# Patient Record
Sex: MALE | Race: BLACK OR AFRICAN AMERICAN | NOT HISPANIC OR LATINO | Employment: FULL TIME | URBAN - METROPOLITAN AREA
[De-identification: names, ages, dates, MRNs, and addresses within clinical notes are randomized per-mention and may not be internally consistent; named-entity substitution may affect disease eponyms.]

---

## 2017-01-24 ENCOUNTER — TELEPHONE (OUTPATIENT)
Dept: HEMATOLOGY/ONCOLOGY | Facility: CLINIC | Age: 52
End: 2017-01-24

## 2017-01-24 NOTE — TELEPHONE ENCOUNTER
----- Message from Aurora Sharpe sent at 1/24/2017  9:43 AM CST -----  Pt called requesting a call back to make and appt/pls call back at [phone]

## 2017-01-24 NOTE — TELEPHONE ENCOUNTER
Spoke with patient, he was in the hospital for 7 weeks at [facility] and would like to schedule a hospital follow up will have dr. peter review and schedule

## 2017-02-03 ENCOUNTER — TELEPHONE (OUTPATIENT)
Dept: HEMATOLOGY/ONCOLOGY | Facility: CLINIC | Age: 52
End: 2017-02-03

## 2017-02-03 NOTE — TELEPHONE ENCOUNTER
Patient f/u appointment scheduled with labs per request. Patient verbalized understanding of time and place.

## 2017-02-06 ENCOUNTER — LAB VISIT (OUTPATIENT)
Dept: LAB | Facility: HOSPITAL | Age: 52
End: 2017-02-06
Attending: NURSE PRACTITIONER
Payer: COMMERCIAL

## 2017-02-06 DIAGNOSIS — C90.00 MYELOMA: ICD-10-CM

## 2017-02-06 LAB
ALBUMIN SERPL BCP-MCNC: 3.4 G/DL
ALP SERPL-CCNC: 99 U/L
ALT SERPL W/O P-5'-P-CCNC: 27 U/L
ANION GAP SERPL CALC-SCNC: 9 MMOL/L
AST SERPL-CCNC: 38 U/L
B2 MICROGLOB SERPL-MCNC: 10.5 UG/ML
BASOPHILS # BLD AUTO: 0.02 K/UL
BASOPHILS NFR BLD: 0.3 %
BILIRUB SERPL-MCNC: 0.4 MG/DL
BUN SERPL-MCNC: 32 MG/DL
CALCIUM SERPL-MCNC: 9.3 MG/DL
CHLORIDE SERPL-SCNC: 108 MMOL/L
CO2 SERPL-SCNC: 23 MMOL/L
CREAT SERPL-MCNC: 2.8 MG/DL
DIFFERENTIAL METHOD: ABNORMAL
EOSINOPHIL # BLD AUTO: 0.5 K/UL
EOSINOPHIL NFR BLD: 8.5 %
ERYTHROCYTE [DISTWIDTH] IN BLOOD BY AUTOMATED COUNT: 14 %
EST. GFR  (AFRICAN AMERICAN): 28.5 ML/MIN/1.73 M^2
EST. GFR  (NON AFRICAN AMERICAN): 24.6 ML/MIN/1.73 M^2
GLUCOSE SERPL-MCNC: 139 MG/DL
HCT VFR BLD AUTO: 29.1 %
HGB BLD-MCNC: 9 G/DL
LDH SERPL L TO P-CCNC: 169 U/L
LYMPHOCYTES # BLD AUTO: 1.8 K/UL
LYMPHOCYTES NFR BLD: 31.4 %
MAGNESIUM SERPL-MCNC: 1.7 MG/DL
MCH RBC QN AUTO: 30.1 PG
MCHC RBC AUTO-ENTMCNC: 30.9 %
MCV RBC AUTO: 97 FL
MONOCYTES # BLD AUTO: 0.9 K/UL
MONOCYTES NFR BLD: 15.5 %
NEUTROPHILS # BLD AUTO: 2.5 K/UL
NEUTROPHILS NFR BLD: 44.3 %
PLATELET # BLD AUTO: 243 K/UL
PMV BLD AUTO: 9.4 FL
POTASSIUM SERPL-SCNC: 4.4 MMOL/L
PROT SERPL-MCNC: 7.7 G/DL
RBC # BLD AUTO: 2.99 M/UL
SODIUM SERPL-SCNC: 140 MMOL/L
WBC # BLD AUTO: 5.74 K/UL

## 2017-02-06 PROCEDURE — 36415 COLL VENOUS BLD VENIPUNCTURE: CPT | Mod: PO

## 2017-02-06 PROCEDURE — 85025 COMPLETE CBC W/AUTO DIFF WBC: CPT | Mod: PO

## 2017-02-06 PROCEDURE — 86334 IMMUNOFIX E-PHORESIS SERUM: CPT | Mod: 26,,, | Performed by: PATHOLOGY

## 2017-02-06 PROCEDURE — 82232 ASSAY OF BETA-2 PROTEIN: CPT

## 2017-02-06 PROCEDURE — 86334 IMMUNOFIX E-PHORESIS SERUM: CPT

## 2017-02-06 PROCEDURE — 84165 PROTEIN E-PHORESIS SERUM: CPT

## 2017-02-06 PROCEDURE — 83520 IMMUNOASSAY QUANT NOS NONAB: CPT

## 2017-02-06 PROCEDURE — 84165 PROTEIN E-PHORESIS SERUM: CPT | Mod: 26,,, | Performed by: PATHOLOGY

## 2017-02-06 PROCEDURE — 83615 LACTATE (LD) (LDH) ENZYME: CPT

## 2017-02-06 PROCEDURE — 80053 COMPREHEN METABOLIC PANEL: CPT

## 2017-02-06 PROCEDURE — 83735 ASSAY OF MAGNESIUM: CPT

## 2017-02-07 LAB
ALBUMIN SERPL ELPH-MCNC: 3.52 G/DL
ALPHA1 GLOB SERPL ELPH-MCNC: 0.39 G/DL
ALPHA2 GLOB SERPL ELPH-MCNC: 1.03 G/DL
B-GLOBULIN SERPL ELPH-MCNC: 0.86 G/DL
GAMMA GLOB SERPL ELPH-MCNC: 1.4 G/DL
INTERPRETATION SERPL IFE-IMP: NORMAL
KAPPA LC SER QL IA: 5.69 MG/DL
KAPPA LC/LAMBDA SER IA: 1.34
LAMBDA LC SER QL IA: 4.24 MG/DL
PATHOLOGIST INTERPRETATION IFE: NORMAL
PATHOLOGIST INTERPRETATION SPE: NORMAL
PROT SERPL-MCNC: 7.2 G/DL

## 2017-02-08 ENCOUNTER — TELEPHONE (OUTPATIENT)
Dept: FAMILY MEDICINE | Facility: CLINIC | Age: 52
End: 2017-02-08

## 2017-02-08 ENCOUNTER — TELEPHONE (OUTPATIENT)
Dept: HEMATOLOGY/ONCOLOGY | Facility: CLINIC | Age: 52
End: 2017-02-08

## 2017-02-08 NOTE — TELEPHONE ENCOUNTER
Per [employer]: Mbr confirms his appt with Dr Stevenson on 2/17, states its a f/u from his hospital stay with [facility] (dc 1/25/17), Mbr is asking if he can have a later in the day appt time so he doesn't have to miss alot of work, please relay this msg to the doctor and have them call him for a later time.

## 2017-02-08 NOTE — TELEPHONE ENCOUNTER
Spoke with pt. Pt states he needs to reschedule. Appointment rescheduled. Pt verbalized understanding.

## 2017-02-17 ENCOUNTER — OFFICE VISIT (OUTPATIENT)
Dept: FAMILY MEDICINE | Facility: CLINIC | Age: 52
End: 2017-02-17
Payer: COMMERCIAL

## 2017-02-17 VITALS
SYSTOLIC BLOOD PRESSURE: 178 MMHG | TEMPERATURE: 98 F | WEIGHT: 315 LBS | DIASTOLIC BLOOD PRESSURE: 85 MMHG | BODY MASS INDEX: 38.36 KG/M2 | HEART RATE: 62 BPM | HEIGHT: 76 IN

## 2017-02-17 DIAGNOSIS — D50.0 IRON DEFICIENCY ANEMIA DUE TO CHRONIC BLOOD LOSS: ICD-10-CM

## 2017-02-17 DIAGNOSIS — I12.9 CKD STAGE 4 SECONDARY TO HYPERTENSION: ICD-10-CM

## 2017-02-17 DIAGNOSIS — E66.01 MORBID OBESITY, UNSPECIFIED OBESITY TYPE: ICD-10-CM

## 2017-02-17 DIAGNOSIS — I10 ESSENTIAL HYPERTENSION: Primary | ICD-10-CM

## 2017-02-17 DIAGNOSIS — D63.1 ANEMIA OF RENAL DISEASE: ICD-10-CM

## 2017-02-17 DIAGNOSIS — N18.4 CKD STAGE 4 SECONDARY TO HYPERTENSION: ICD-10-CM

## 2017-02-17 DIAGNOSIS — N18.9 ANEMIA OF RENAL DISEASE: ICD-10-CM

## 2017-02-17 DIAGNOSIS — R73.9 HYPERGLYCEMIA: ICD-10-CM

## 2017-02-17 PROCEDURE — 99999 PR PBB SHADOW E&M-EST. PATIENT-LVL III: CPT | Mod: PBBFAC,,, | Performed by: FAMILY MEDICINE

## 2017-02-17 PROCEDURE — 3077F SYST BP >= 140 MM HG: CPT | Mod: S$GLB,,, | Performed by: FAMILY MEDICINE

## 2017-02-17 PROCEDURE — 99214 OFFICE O/P EST MOD 30 MIN: CPT | Mod: S$GLB,,, | Performed by: FAMILY MEDICINE

## 2017-02-17 PROCEDURE — 3079F DIAST BP 80-89 MM HG: CPT | Mod: S$GLB,,, | Performed by: FAMILY MEDICINE

## 2017-02-17 RX ORDER — HYDRALAZINE HYDROCHLORIDE 50 MG/1
50 TABLET, FILM COATED ORAL EVERY 12 HOURS
Qty: 60 TABLET | Refills: 11 | Status: SHIPPED | OUTPATIENT
Start: 2017-02-17 | End: 2017-07-11 | Stop reason: ALTCHOICE

## 2017-02-17 RX ORDER — FUROSEMIDE 20 MG/1
20 TABLET ORAL 2 TIMES DAILY
COMMUNITY
End: 2019-04-12

## 2017-02-17 NOTE — PROGRESS NOTES
Subjective:      Patient ID: Leodan Dan is a 51 y.o. male.    Chief Complaint: Follow-up (hospital)    HPIhe is f/u on his hospitalization for his left knee replacement in May which got infected and he went to the hospital to go back in and he had to have some parts replaced.    He is here to f/u on his other medical problems.     Discharge Summaries  - in this encounter    Carlos Ibarra MD - 12/13/2016 10:04 AM CST  Formatting of this note may be different from the original.  [facility] DISCHARGE SUMMARY    Patient ID:  Leodan Dan  [id number]  51 y.o.  1965    Admit Date:   12/4/2016 1:43 PM    Discharge Date:   Discharge Today: 12/13/2016    Admitting Physician:   Carlos Ibarra MD    Discharge Physician:   Carlos Ibarra MD    Consults:  Consultants   Provider Service Role Specialty   Paramjit Fernandez MD Nephrology Consulting Physician Nephrology   Arianna Hermna NP -- Nurse Practitioner Nurse Practitioner   Melvi Guo MD Infectious Disease Consulting Physician Infectious Diseases   Jose Looney MD Infectious Disease Consulting Physician Infectious Diseases   Gray Cuellar DPM Podiatry Consulting Physician Podiatry   Colt Kingsley MD Orthopedics Consulting Physician Orthopedic Surgery   Nathan Rodriguez MD -- Consulting Physician Pulmonary Disease   Hugo Boyer MD Cardiology Consulting Physician Cardiology   Franky Branch MD Cardiothoracic Surgery Consulting Physician Cardiothoracic Surgery   Lars Hirsch MD Cardiothoracic Surgery Consulting Physician Cardiothoracic Surgery       Reason for Admission/Admission Diagnoses:   Present on Admission:   Venous stasis ulcer   Infected wound   Morbid obesity due to excess calories   Myasthenia gravis in remission   Obstructive sleep apnea   Acute worsening of stage 3 chronic kidney disease   MRSA bacteremia    Discharge Diagnoses:   Active Hospital Problems   Diagnosis Date Noted    MRSA bacteremia  12/10/2016    Infected wound 12/05/2016    Acute worsening of stage 3 chronic kidney disease    Myasthenia gravis in remission 06/08/2016    Morbid obesity due to excess calories    Obstructive sleep apnea    Venous stasis ulcer 12/13/2013     Resolved Hospital Problems   Diagnosis Date Noted Date Resolved     History of Present Illness:   Leodan Dan is a 51 y.o. male that has a past medical history of Anemia; Arthritis; Chronic kidney disease; Encounter for blood transfusion; GERD (gastroesophageal reflux disease); Hormone disorder; MRSA infection (10/2013); Hypertension; Kidney stone; Multiple myeloma; Myasthenia gravis; Sleep apnea; and Urinary tract infection. Presents to the emergency room complaining of furnace of breath which started suddenly at noon when he was in [employer] not associated with any chest pain abdominal pain and hematochezia hemoptysis hematemesis OR dysuria hematuria. The patient states that he was in his normal state of health when he experienced shortness of breath and presented to the ED. Here he was placed on oxygen with improvement. Patient is morbidly obese and had an elevated d-dimer and does have an open foot wound. VQ scan not ordered however was treated empirically. Patient has subjective complaint of fever without chills night sweats productive cough sinus ache and diarrhea. He recently saw wound care last Thursday and is supposed to see them tomorrow.    Hospital Course and Treatment:   Admission Information   Date & Time Provider Department Dept. Phone   12/4/2016 Carlos Ibarra MD [facility] Ortho/Neurosurgery [phone]       LTAC transfer today for 6 weeks of IV Teflaro (last day will be 01/16/2017).      Sepsis due to MRSA: Improving with IV antibiotics, afebrile within last 24 hours. Source MRSA bacteremia. Continue Teflaro (started on 12/05/16) as current per ID recommendations.     MRSA Bacteremia: Positive blood culture on 12/04/16.  Likely source right ankle ulcer. SHELTON done 12/07/16 was negative for vegetations/endocarditis. Continue Teflaro as current. Repeat blood cultures 12/06/16 negative so far. Blood cultures from 12/08/16 no growth so far.     Acute Pulmonary Edema: Resolved, extubated 12/08/16 evening. Continue diuretics, changed to oral.     Acute on chronic hypoxemic respiratory failure: Extubated on 12/08/16.     Acute worsening of chronic kidney disease stage IV: Serum creatinine has improved to 3.8, nephrology following.     Acute blood loss anemia: Status post 2 units PRBC transfusion on 12/06/16. Hemoglobin improved from 7.9-9.1. Currently 8.7.      Severe iron Deficiency Anemia: TSAT 4% on 12/05/16. Received 2 units PRBC transfusion on 12/06/16.     Left knee pain: History of left total knee arthroplasty in the past. There was a concern for septic arthritis of the left knee, orthopedics has evaluated and Dr.Lafluer has taken the patient to OR on 12/06/16 for I&D and washout, but synovial fluid cultures remain negative for bacterial pathogens. Dr. Mccollum planned for another washout of the left knee if he re-accumulated more joint fluid, which he did not and hence no repeat washout was needed yesterday.      Right ankle ulcer: Wound culture positive for MRSA, heavy growth of E. coli and Proteus. Currently on Teflaro as an antibiotic of choice. ID following. Patient was seen and evaluated by Dr.David Cuellar, podiatrist on 12/07/16.  was consulted for possible arterial insufficiency, but no arterial disease was identified,  recommended aggressive wound care.     Hyperglycemia: Not a diabetic, hemoglobin A1c is normal at 5.3 on 12/07/16.. Maintaind him on low-dose insulin sliding scale.     Hypokalemia: replaced as needed. K was 5.3 today. Discontinued scheduled K supplementation.     Morbid obesity: BMI 56.9:     History of myasthenia gravis.     History of Multiple Myeloma: patient followed by  as  out-patient. Currently in remission.    I discussed with the patient disease process and treatment.     I have personally seen and examined the patient, Leodan Dan, in a face to face encounter on the date of discharge.     He is cleared for discharge with instructions to follow up as directed.   Total time in the care and discharge planning of this patient was greater than 30 minutes.    Significant Diagnostic Studies:  Recent Imaging and Procedure Results   Procedure Component Value Ref Range Date/Time   Blood Culture #1 [[id number]] Collected: 12/08/2016 0339   Specimen: Blood from Blood Updated: 12/13/2016 0502   Soft Micro Culture   No growth 1 day(s).~No growth 2 day(s).~No growth 3 day(s).~No growth 4 day(s).~No growth 5 day(s).   Blood Culture #2 [[id number]] Collected: 12/08/2016 0347   Specimen: Blood from Blood Updated: 12/13/2016 0502   Soft Micro Culture   No growth 1 day(s).~No growth 2 day(s).~No growth 3 day(s).~No growth 4 day(s).~No growth 5 day(s).   Anaerobic culture [[id number]] Collected: 12/06/2016 2207   Specimen: Synovial Fluid from Synovial Fluid Updated: 12/12/2016 0724   Soft Micro Culture No anaerobes isolated   Anaerobic culture [[id number]] Collected: 12/06/2016 2208   Specimen: Synovial Fluid from Synovial Fluid Updated: 12/12/2016 0724   Soft Micro Culture No anaerobes isolated   Blood Culture #2 [[id number]] Collected: 12/06/2016 1037   Specimen: Blood from Blood Updated: 12/11/2016 1102   Soft Micro Culture   No growth 1 day(s).~No growth 2 day(s).~No growth 3 day(s).~No growth 4 day(s).~No growth 5 day(s).   Blood Culture #1 [[id number]] Collected: 12/06/2016 1028   Specimen: Blood from Blood Updated: 12/11/2016 1102   Soft Micro Culture   No growth 1 day(s).~No growth 2 day(s).~No growth 3 day(s).~No growth 4 day(s).~No growth 5 day(s).   Anaerobic Culture [[id number]] Collected: 12/06/2016 1845   Specimen: Synovial Fluid Updated: 12/11/2016 0943   Soft Micro Culture No  anaerobes isolated   Body Fluid Culture (Includes Gram Stain) [[id number]] Collected: 12/06/2016 2207   Specimen: Synovial Fluid from Synovial Fluid Updated: 12/10/2016 0700   Soft Micro Culture   No growth 1 day~No growth 2 days~No growth 3 days   Soft Micro Culture Few WBC's seen.~No organisms seen.   Body Fluid Culture (Includes Gram Stain) [[id number]] Collected: 12/06/2016 2208   Specimen: Synovial Fluid from Synovial Fluid Updated: 12/10/2016 0700   Soft Micro Culture   No growth 1 day~No growth 2 days~No growth 3 days   Soft Micro Culture Few WBC's seen.~No organisms seen.   Sputum culture Includes Gram Stain [[id number]] Collected: 12/06/2016 2315   Specimen: Sputum Induced Updated: 12/09/2016 0752   Soft Micro Culture Scant growth normal respiratory kp   Soft Micro Culture   <10 epithelial cells/LPF.~10-25 WBC's/LPF.~No organisms seen.   Body Fluid Culture (Includes Gram Stain) [[id number]] Collected: 12/06/2016 1845   Specimen: Synovial Fluid Updated: 12/09/2016 0743   Soft Micro Culture   No growth less than 16 hours~No growth 2 days~No growth 3 days   Soft Micro Culture Few WBC's seen.~No organisms seen.   Wound Culture (Includes Gram Stain) [[id number]] (Susceptibility) Collected: 12/04/2016 1548   Specimen: Wound from Wound Deep Updated: 12/09/2016 0728   Soft Micro Culture   Rare WBC's seen.~Few gram positive cocci (pairs).~Few gram positive rods.~Few gram negative rods.   Soft Micro Culture Escherichia coli   Soft Micro Culture heavy growth   Soft Micro Culture Proteus mirabilis   Soft Micro Culture   heavy growth~Possible Proteus~complete sensitivities to follow.   Soft Micro Culture Staphylococcus aureus   Soft Micro Culture   light growth~MRSA ISOLATED~CRITICAL VALUE CALLED TO AND READ BACK BY: SHEILA EVANS RN ON~12/07/2016 06:53 BY MTR   Soft Micro Culture Streptococcus agalactiae (Group B)   Soft Micro Culture light growth   Soft Micro Culture Enterococcus faecalis   Soft Micro Culture  light growth   Culture & Susceptibility   STAPHYLOCOCCUS AUREUS   Antibiotic Sensitivity Microscan Unit   Beta-Lactamase Pos ug/mL   Method: MINIMAL INHIBITORY CONCENTRATION   Clindamycin Resistant >=4 ug/mL   Method: MINIMAL INHIBITORY CONCENTRATION   Doxycycline Sensitive 2 ug/mL   Method: MINIMAL INHIBITORY CONCENTRATION   Erythromycin Resistant >=8 ug/mL   Method: MINIMAL INHIBITORY CONCENTRATION   Gentamicin Sensitive <=0.5 ug/mL   Method: MINIMAL INHIBITORY CONCENTRATION   Inducible Clindamycin Resist. Neg ug/mL   Method: MINIMAL INHIBITORY CONCENTRATION   Minocycline Resistant = ug/mL   Method: MINIMAL INHIBITORY CONCENTRATION   Oxacillin (Methicillin) Resistant >=4 ug/mL   Method: MINIMAL INHIBITORY CONCENTRATION   Rifampin Sensitive <=0.5 ug/mL   Method: MINIMAL INHIBITORY CONCENTRATION   Trimeth-Sulfa Sensitive <=10 ug/mL   Method: MINIMAL INHIBITORY CONCENTRATION   Vancomycin Sensitive 1 ug/mL   Method: MINIMAL INHIBITORY CONCENTRATION         ENTEROCOCCUS FAECALIS   Antibiotic Sensitivity Microscan Unit   Ampicillin Sensitive <=2 ug/mL   Method: MINIMAL INHIBITORY CONCENTRATION   Doxycycline Resistant >=16 ug/mL   Method: MINIMAL INHIBITORY CONCENTRATION   Gentamicin Synergy SYN-S ug/mL   Method: MINIMAL INHIBITORY CONCENTRATION   Streptomycin Synergy SYN-S ug/mL   Method: MINIMAL INHIBITORY CONCENTRATION   Tetracycline Resistant >=16 ug/mL   Method: MINIMAL INHIBITORY CONCENTRATION   Vancomycin Sensitive 1 ug/mL   Method: MINIMAL INHIBITORY CONCENTRATION         ESCHERICHIA COLI   Antibiotic Sensitivity Microscan Unit   Amox/KAug Sensitive 4 ug/mL   Method: MINIMAL INHIBITORY CONCENTRATION   Ampicillan/Sulbactam Intermediate 16 ug/mL   Method: MINIMAL INHIBITORY CONCENTRATION   Cefazolin Sensitive <=4 ug/mL   Method: MINIMAL INHIBITORY CONCENTRATION   Cefepime Sensitive <=1 ug/mL   Method: MINIMAL INHIBITORY CONCENTRATION   Ceftriaxone Sensitive <=1 ug/mL   Method: MINIMAL INHIBITORY CONCENTRATION    Ciprofloxacin Resistant >=4 ug/mL   Method: MINIMAL INHIBITORY CONCENTRATION   ESBL Neg ug/mL   Method: MINIMAL INHIBITORY CONCENTRATION   Gentamicin Sensitive <=1 ug/mL   Method: MINIMAL INHIBITORY CONCENTRATION   Levofloxacin Resistant >=8 ug/mL   Method: MINIMAL INHIBITORY CONCENTRATION   Meropenem Sensitive <=0.25 ug/mL   Method: MINIMAL INHIBITORY CONCENTRATION   Piperacillin/Manjit Sensitive <=4 ug/mL   Method: MINIMAL INHIBITORY CONCENTRATION   Trimeth-Sulfa Resistant >=320 ug/mL   Method: MINIMAL INHIBITORY CONCENTRATION         PROTEUS MIRABILIS   Antibiotic Sensitivity Microscan Unit   Amikacin Sensitive 8 ug/mL   Method: MINIMAL INHIBITORY CONCENTRATION   Meropenem Sensitive <=0.25 ug/mL   Method: MINIMAL INHIBITORY CONCENTRATION         STREPTOCOCCUS AGALACTIAE (GROUP B)   Antibiotic Sensitivity Microscan Unit   Ampicillin Sensitive <=0.25 ug/mL   Method: MINIMAL INHIBITORY CONCENTRATION   Cefotaxime Sensitive <=0.12 ug/mL   Method: MINIMAL INHIBITORY CONCENTRATION   Ceftriaxone Sensitive <=0.12 ug/mL   Method: MINIMAL INHIBITORY CONCENTRATION   Clindamycin Resistant >=1 ug/mL   Method: MINIMAL INHIBITORY CONCENTRATION   Erythromycin Resistant >=8 ug/mL   Method: MINIMAL INHIBITORY CONCENTRATION   Inducible Clindamycin Resist. Neg ug/mL   Method: MINIMAL INHIBITORY CONCENTRATION   Levofloxacin Sensitive 1 ug/mL   Method: MINIMAL INHIBITORY CONCENTRATION   Penicillin Sensitive <=0.06 ug/mL   Method: MINIMAL INHIBITORY CONCENTRATION   Tetracycline Resistant >=16 ug/mL   Method: MINIMAL INHIBITORY CONCENTRATION   Vancomycin Sensitive 0.5 ug/mL   Method: MINIMAL INHIBITORY CONCENTRATION               CATH LAB ONLY-SHELTON Order Panel* [[id number]] Resulted: 12/07/2016 1356   Updated: 12/07/2016 1356   Narrative:   Greg Cee MD 12/7/2016 1:56 PM  SHELTON Note    Name of the patient: Leodan Dan     Date: 12/07/16    : GREG CEE MD    Procedure: SHELTON    Indication: Fever and MRSA  bacteremia.     Procedure in detail:  Patient was informed and consented as to risks and benefits of   procedure. Patient has been NPO for procedure. Vital signs were   monitored throughout procedure. Patient was sedated with versed   and fentanyl. SHELTON probe was inserted in the esophagus. Multiple   echocardiographic views were obtained. Patient tolerated   procedure well with no complications. Patient was recovered in   good condition.    Impression:  1. No SHELTON evidence of endocarditis. (See full SHELTON report)     Recommendations:  1. As per ID.     SAUL CEE MD  [facility]  [address] [address]  [place] [address]  Office: [phone]       AFB stain [[id number]] Collected: 12/06/2016 2207   Specimen: Synovial Fluid Updated: 12/07/2016 1135   Soft Micro Culture No acid fast bacillus seen.   AFB stain [[id number]] Collected: 12/06/2016 2208   Specimen: Synovial Fluid Updated: 12/07/2016 1135   Soft Micro Culture No acid fast bacillus seen.   AFB stain [[id number]] Collected: 12/06/2016 1845   Specimen: Synovial Fluid Updated: 12/07/2016 1134   Soft Micro Culture No acid fast bacillus seen.   Gram stain [[id number]] Collected: 12/06/2016 2207   Specimen: Synovial Fluid Updated: 12/07/2016 0714   Soft Micro Culture Few WBC's seen.~No organisms seen.   Gram stain [[id number]] Collected: 12/06/2016 2208   Specimen: Synovial Fluid Updated: 12/07/2016 0714   Soft Micro Culture Few WBC's seen.~No organisms seen.   Gram stain [[id number]] Collected: 12/06/2016 2315   Specimen: Sputum Induced Updated: 12/07/2016 0713   Soft Micro Culture   <10 epithelial cells/LPF.~10-25 WBC's/LPF.~No organisms seen.   Gram stain [[id number]] Collected: 12/06/2016 1845   Specimen: Synovial Fluid Updated: 12/07/2016 0709   Soft Micro Culture Few WBC's seen.~No organisms seen.   Gram stain [[id number]] Collected: 12/04/2016 1534   Specimen: Blood Updated: 12/07/2016 0614   Soft Micro Culture   gram positive cocci  (clusters) in anaerobic blood culture bottle called~to Nurse Kelley 12/05/2016 21:02 sdw   Blood Culture x 1 [[id number]] (Susceptibility) Collected: 12/04/2016 1534   Specimen: CULTURE, BLOOD from Blood Updated: 12/07/2016 0614   Soft Micro Culture   No growth 1 day(s).~preliminary positive culture; see gram stain results   Soft Micro Culture Staphylococcus aureus   Soft Micro Culture   MRSA ISOLATED~CRITICAL VALUE CALLED TO AND READ BACK BY: Sita Lara Rn on~12/07/2016 06:01 by mtr   Culture & Susceptibility   STAPHYLOCOCCUS AUREUS   Antibiotic Sensitivity Microscan Unit   Beta-Lactamase Pos ug/mL   Method: MINIMAL INHIBITORY CONCENTRATION   Clindamycin Resistant >=4 ug/mL   Method: MINIMAL INHIBITORY CONCENTRATION   Doxycycline Sensitive <=0.5 ug/mL   Method: MINIMAL INHIBITORY CONCENTRATION   Erythromycin Resistant >=8 ug/mL   Method: MINIMAL INHIBITORY CONCENTRATION   Gentamicin Sensitive <=0.5 ug/mL   Method: MINIMAL INHIBITORY CONCENTRATION   Inducible Clindamycin Resist. Neg ug/mL   Method: MINIMAL INHIBITORY CONCENTRATION   Minocycline Sensitive = ug/mL   Method: MINIMAL INHIBITORY CONCENTRATION   Nitrofurantoin Sensitive <=16 ug/mL   Method: MINIMAL INHIBITORY CONCENTRATION   Oxacillin (Methicillin) Resistant >=4 ug/mL   Method: MINIMAL INHIBITORY CONCENTRATION   Rifampin Sensitive <=0.5 ug/mL   Method: MINIMAL INHIBITORY CONCENTRATION   Trimeth-Sulfa Sensitive <=10 ug/mL   Method: MINIMAL INHIBITORY CONCENTRATION   Vancomycin Sensitive 1 ug/mL   Method: MINIMAL INHIBITORY CONCENTRATION               US Lower Extremity Veins Bilateral [[id number]] Collected: 12/05/2016 1353   Updated: 12/05/2016 1357   Narrative:   REASON FOR EXAM: possible DVT; Dyspnea     TECHNICAL FACTORS: B-mode and Doppler sonographic images were obtained of the common femoral, femoral, popliteal, posterior tibial, peroneal, and proximal great saphenous and profunda femoral veins for assessment of  patency.    TECHNOLOGIST: Anita Bahena    COMPARISON: May 27, 2016    FINDINGS:   Within the right lower extremity, all veins demonstrate compressibility, no internal echoes, and phasic flow with normal augmentation.     Within the left lower extremity, all veins demonstrate compressibility, no internal echoes, and phasic flow with normal augmentation.     IMPRESSION:  No evidence of deep venous thrombosis of the lower extremities.         Electronically signed by Kayden Lopez MD on 2016 1:54 PM      Echo Complete [[id number]] Collected: 2016   Updated: 2016 1256   Narrative:       Adult Echocardiogram  Name: LOUISE FERNANDEZ Study Date: 2016  MRN: [id number] Age: 51 yrs  Patient Location: [facility] [place] Height: 78 in  : 1965 Weight: 470 lb  Gender: Male BSA: 3.2 m2  Reason For Study: chf  History: CHF  BP: 148/92 mmHg    Ordering Physician: ARVIND ROGERS  Referring Physician: GARRETT LUNA  Performed By: KARLY Holman    Interpretation Summary  Lung Interference  Left ventricular systolic function is normal.  Ejection Fraction = 55-60%.  A variety of Doppler measurements indicate normal left ventricular diastolic  function.  There is mild mitral regurgitation.    Measurements with Normals  IVSd: 1.1 cm (0.6-1.1 cm) LVIDd: 5.1 cm (3.7-5.6 cm)  LVPWd: 1.2 cm (0.6-1.1 cm)  IVSs: 1.9 cm (0.8-2.0 cm) LVIDs: 4.0 cm (2.3-3.9 cm)  LVPWs: 2.2 cm (0.6-1.1 cm)  Ao root diam: 2.8 cm (2.0-3.7 cm)  LA dimension: 4.5 cm (1.9-4.0 cm)    MMode/2D Measurements & Calculations  FS: 23.0 % % IVS thick: 72.9 %  EDV(Teich): 125.4 ml  ESV(Teich): 68.0 ml  EF(Teich): 45.8 %  Ao root area: 6.1 cm2    Doppler Measurements & Calculations  MV E max temitope: 105.7 cm/sec MV dec slope: 501.9 cm/sec2  MV A max temitope: 80.8 cm/sec MV dec time: 0.24 sec  MV E/A: 1.3  Ao V2 max: 193.8 cm/sec  Ao max PG: 15.2 mmHg    LEFT VENTRICLE    o The left ventricle is normal in size.  o Ejection Fraction = 55-60%.  o Left  ventricular systolic function is normal.  o There is normal left ventricular wall thickness.    DIASTOLOGY    o A variety of Doppler measurements indicate normal left ventricular  diastolic function.    RIGHT VENTRICLE    o The right ventricle is normal in size and function.  o RV systolic function grossly normal.    ATRIA    o The left atrial size is normal.  o Right atrial size is normal.    MITRAL VALVE    o The mitral valve is normal in structure and function.  o There is mild mitral regurgitation.    TRICUSPID VALVE    o The tricuspid valve is normal in structure and function.  o There is mild tricuspid regurgitation.    AORTIC VALVE    o Normal tricuspid aortic valve.  o No aortic regurgitation is present.  o AV MAX PG 15.2 mmHg.    PULMONIC VALVE    o The pulmonic valve is not well seen, but is grossly normal.    GREAT VESSELS    o The aortic root is normal size.  o Normal size aorta.  o The pulmonary artery is not well visualized.    PERICARDIUM/PLEURAL EFFUSION    o There is no pericardial effusion.  o There is no pleural effusion.    _______________________________________________________________________________    Electronically signed by: Greg Anderson M.D. 12/05/2016 12:55 PM   Gram stain [[id number]] Collected: 12/04/2016 1548   Specimen: Wound Deep Updated: 12/05/2016 0733   Soft Micro Culture   Rare WBC's seen.~Few gram positive cocci (pairs).~Few gram positive rods.~Few gram negative rods.       Surgical Procedures during this visit:    Procedure(s):  IRRIGATION AND DEBRIDEMENT - LOWER EXTREMITY WITH POSSIBLE POLYETHYLENE LINER EXCHANGE  Date  12/4/2016 - 12/6/2016  Primary Surgeon  Colt Kingsley MD  -------------------    Patient's Condition On Discharge:   Stable    Physical Exam   Constitutional: He is oriented to person, place, and time. He appears well-developed and well-nourished. No distress.   HENT:   Head: Normocephalic and atraumatic.   Nose: Nose normal.   Mouth/Throat:  Oropharynx is clear and moist.   Eyes: Conjunctivae and EOM are normal. Pupils are equal, round, and reactive to light. No scleral icterus.   Neck: Normal range of motion. Neck supple. No JVD present. No tracheal deviation present. No thyromegaly present.   Cardiovascular: Normal rate, regular rhythm, normal heart sounds and intact distal pulses. Exam reveals no friction rub.   No murmur heard.  Pulmonary/Chest: Effort normal and breath sounds normal. No respiratory distress. He has no wheezes. He has no rales. He exhibits no tenderness.   Abdominal: Soft. Bowel sounds are normal. He exhibits no distension. There is no tenderness. No hernia.   Musculoskeletal: He exhibits no edema or tenderness.   Left knee ROM limited   Lymphadenopathy:   He has no cervical adenopathy.   Neurological: He is alert and oriented to person, place, and time. No cranial nerve deficit. He exhibits normal muscle tone. Coordination normal.   Skin: Skin is warm. No rash noted. He is not diaphoretic. No erythema.   Psychiatric: He has a normal mood and affect. His behavior is normal.   Nursing note and vitals reviewed.    Discharge Disposition:   Order for Discharge   Start Ordered   12/13/16 1004 Discharge Disposition to: Long Term Acute Care Once   Question: Discharge Disposition to Answer: Long Term Acute Care   12/13/16 1004         Discharge Orders:    Immunizations Administered for This Admission   No immunizations given this admission.         Medication List     As of 12/13/2016 10:04 AM     START taking these medications     acetaminophen 325 mg tablet   Commonly known as: TYLENOL   Take 2 tablets (650 mg total) by mouth every 6 (six) hours as needed for Fever.       albuterol sulfate 2.5 mg/0.5 mL Nebu   Commonly known as: PROVENTIL   Take 0.5 mLs (2.5 mg total) by nebulization every 6 (six) hours as needed.       alum-mag hydroxide-simethicone 400-400-40 mg/5 mL suspension   Commonly known as: MAG-AL PLUS EXTRA STRENGTH   Take 15  mLs by mouth every 6 (six) hours as needed for Indigestion.       ammonium lactate 12 % lotion   Commonly known as: LAC-HYDRIN   Apply topically 2 (two) times daily.       carvedilol 6.25 MG tablet   Commonly known as: COREG   Take 1 tablet (6.25 mg total) by mouth 2 (two) times daily with meals.       collagenase ointment   Commonly known as: SANTYL   Apply topically daily.       ipratropium 0.02 % nebulizer solution   Commonly known as: ATROVENT   Take 2.5 mLs (0.5 mg total) by nebulization every 6 (six) hours as needed.       iron sucrose 100 mg iron/5 mL injection   Commonly known as: VENOFER   Inject 10 mLs (200 mg total) into the vein daily.       lactulose 20 gram/30 mL Soln solution   Commonly known as: CHRONULAC   Take 30 mLs (20 g total) by mouth daily as needed.       ondansetron 4 mg/2 mL injection   Commonly known as: ZOFRAN   Inject 2 mLs (4 mg total) into the vein every 6 (six) hours as needed.       * oxyCODONE-acetaminophen  mg per tablet   Commonly known as: PERCOCET   Take 1 tablet by mouth every 6 (six) hours as needed.       * oxyCODONE-acetaminophen 5-325 mg per tablet   Commonly known as: PERCOCET   Take 1 tablet by mouth every 6 (six) hours as needed.       sodium chloride MBP 0.9 % PgBk 50 mL with ceftaroline fosamil 400 mg SolR 400 mg   Inject 400 mg into the vein every 12 (twelve) hours.       temazepam 7.5 MG capsule   Commonly known as: RESTORIL   Take 1 capsule (7.5 mg total) by mouth at bedtime nightly as needed for Sleep.       traMADol 50 mg tablet   Commonly known as: ULTRAM   Take 1 tablet (50 mg total) by mouth every 6 (six) hours as needed.       * Notice: This list has 2 medication(s) that are the same as other medications prescribed for you. Read the directions carefully, and ask your doctor or other care provider to review them with you.     CONTINUE taking these medications     ascorbic acid with akhil hips 500 MG tablet   Generic drug: ascorbic acid (vitamin C)   Take  500 mg by mouth daily.       aspirin 325 MG tablet   Take 325 mg by mouth daily.       ketoconazole 2 % cream   Quantity: 60 g   Commonly known as: NIZORAL   Apply topically daily. Apply to both feet once daily       pyridostigmine 60 mg tablet   Commonly known as: MESTINON   Take 60 mg by mouth 3 (three) times daily.           Where to Get Your Medications     Information about where to get these medications is not yet available   ! Ask your nurse or doctor about these medications    acetaminophen 325 mg tablet    albuterol sulfate 2.5 mg/0.5 mL Nebu    alum-mag hydroxide-simethicone 400-400-40 mg/5 mL suspension    ammonium lactate 12 % lotion    carvedilol 6.25 MG tablet    collagenase ointment    ipratropium 0.02 % nebulizer solution    iron sucrose 100 mg iron/5 mL injection    lactulose 20 gram/30 mL Soln solution    ondansetron 4 mg/2 mL injection    oxyCODONE-acetaminophen  mg per tablet    oxyCODONE-acetaminophen 5-325 mg per tablet    sodium chloride MBP 0.9 % PgBk 50 mL with ceftaroline fosamil 400 mg SolR 400 mg    temazepam 7.5 MG capsule    traMADol 50 mg tablet         Discharge Orders   Future Labs/Procedures Expected by Expires   Activity: As Tolerated As directed   Activity: Per Rehab Recommendations As directed   Diet (Select type) Diabetic; 2000 hortencia ADA; Send low fat milk with each meal. No Greens., 1 packet of dianna and sprite zero on all trays As directed   Questions:   Diet Type: Diabetic   Diet, Diabetic: 2000 hort[address] ADA   Diabetic selection:   Other Restriction(s):   Sodium Restriction:   Fluid restriction:   Preferences: Send low fat milk with each meal. No Greens.   1 packet of dianna and sprite zero on all trays   Vital Signs Per Facility Protocol As directed   Weights Per Facility Protocol As directed       - - Greater than 40 minutes spent on discharge planning.    - Carlos Ibarra MD.        Medications at Time of Discharge  - as of this encounter    Medication Sig.  Disp. Refills Start Date End Date   acetaminophen (TYLENOL) 325 mg tablet   Take 2 tablets (650 mg total) by mouth every 6 (six) hours as needed for Fever.   0 12/12/2016     albuterol sulfate (PROVENTIL) 2.5 mg/0.5 mL Nebu   Take 0.5 mLs (2.5 mg total) by nebulization every 6 (six) hours as needed.   0 12/12/2016     ammonium lactate (LAC-HYDRIN) 12 % lotion   Apply topically 2 (two) times daily.     12/12/2016     aspirin 325 MG tablet   Take 325 mg by mouth daily.           carvedilol (COREG) 6.25 MG tablet   Take 1 tablet (6.25 mg total) by mouth 2 (two) times daily with meals.     12/12/2016     ipratropium (ATROVENT) 0.02 % nebulizer solution   Take 2.5 mLs (0.5 mg total) by nebulization every 6 (six) hours as needed.   0 12/12/2016     ketoconazole (NIZORAL) 2 % cream    Indications: Tinea pedis of both feet Apply topically daily. Apply to both feet once daily 60 g   1 09/01/2016     lactulose (CHRONULAC) 20 gram/30 mL Soln solution   Take 30 mLs (20 g total) by mouth daily as needed.     12/12/2016     ondansetron (ZOFRAN) 4 mg/2 mL injection   Inject 2 mLs (4 mg total) into the vein every 6 (six) hours as needed.     12/12/2016     pyridostigmine (MESTINON) 60 mg tablet   Take 60 mg by mouth 3 (three) times daily.           pyridostigmine (MESTINON) 60 mg tablet   Take 2 tablets 3-4 times daily     02/23/2016     sodium chloride MBP 0.9 % PgBk 50 mL with ceftaroline fosamil 400 mg SolR 400 mg    Indications: Complicated Skin and Skin Structure Infection Inject 400 mg into the vein every 12 (twelve) hours.     12/12/2016     temazepam (RESTORIL) 7.5 MG capsule   Take 1 capsule (7.5 mg total) by mouth at bedtime nightly as needed for Sleep.     12/12/2016     alum-mag hydroxide-simethicone (MAG-AL PLUS EXTRA STRENGTH) 400-400-40 mg/5 mL suspension   Take 15 mLs by mouth every 6 (six) hours as needed for Indigestion.   0 12/12/2016 12/22/2016   collagenase (SANTYL) ointment   Apply topically daily.   0  12/12/2016 12/22/2016   oxyCODONE-acetaminophen (PERCOCET)  mg per tablet   Take 1 tablet by mouth every 6 (six) hours as needed.   0 12/12/2016 12/22/2016   oxyCODONE-acetaminophen (PERCOCET) 5-325 mg per tablet   Take 1 tablet by mouth every 6 (six) hours as needed.   0 12/12/2016 12/22/2016   traMADol (ULTRAM) 50 mg tablet   Take 1 tablet (50 mg total) by mouth every 6 (six) hours as needed.   0 12/12/2016 12/22/2016   ascorbic acid, vitamin C, (ASCORBIC ACID WITH VENKAT HIPS) 500 MG tablet   Take 500 mg by mouth daily.       02/01/2017   iron sucrose (VENOFER) 100 mg iron/5 mL injection   Inject 10 mLs (200 mg total) into the vein daily.     12/12/2016 02/01/2017   Ordered Prescriptions  - in this encounter    Prescription Sig. Disp. Refills Start Date End Date   lactulose (CHRONULAC) 20 gram/30 mL Soln solution   Take 30 mLs (20 g total) by mouth daily as needed.     12/12/2016     ondansetron (ZOFRAN) 4 mg/2 mL injection   Inject 2 mLs (4 mg total) into the vein every 6 (six) hours as needed.     12/12/2016     temazepam (RESTORIL) 7.5 MG capsule   Take 1 capsule (7.5 mg total) by mouth at bedtime nightly as needed for Sleep.     12/12/2016     ipratropium (ATROVENT) 0.02 % nebulizer solution   Take 2.5 mLs (0.5 mg total) by nebulization every 6 (six) hours as needed.   0 12/12/2016     albuterol sulfate (PROVENTIL) 2.5 mg/0.5 mL Nebu   Take 0.5 mLs (2.5 mg total) by nebulization every 6 (six) hours as needed.   0 12/12/2016     sodium chloride MBP 0.9 % PgBk 50 mL with ceftaroline fosamil 400 mg SolR 400 mg    Indications: Complicated Skin and Skin Structure Infection Inject 400 mg into the vein every 12 (twelve) hours.     12/12/2016     ammonium lactate (LAC-HYDRIN) 12 % lotion   Apply topically 2 (two) times daily.     12/12/2016     carvedilol (COREG) 6.25 MG tablet   Take 1 tablet (6.25 mg total) by mouth 2 (two) times daily with meals.     12/12/2016     acetaminophen (TYLENOL) 325 mg tablet   Take  "2 tablets (650 mg total) by mouth every 6 (six) hours as needed for Fever.   0 12/12/2016     iron sucrose (VENOFER) 100 mg iron/5 mL injection   Inject 10 mLs (200 mg total) into the vein daily.     12/12/2016 02/01/2017   oxyCODONE-acetaminophen (PERCOCET) 5-325 mg per tablet   Take 1 tablet by mouth every 6 (six) hours as needed.   0 12/12/2016 12/22/2016   traMADol (ULTRAM) 50 mg tablet   Take 1 tablet (50 mg total) by mouth every 6 (six) hours as needed.   0 12/12/2016 12/22/2016   oxyCODONE-acetaminophen (PERCOCET)  mg per tablet   Take 1 tablet by mouth every 6 (six) hours as needed.   0 12/12/2016 12/22/2016   collagenase (SANTYL) ointment   Apply topically daily.   0 12/12/2016 12/22/2016   alum-mag hydroxide-simethicone (MAG-AL PLUS EXTRA STRENGTH) 400-400-40 mg/5 mL suspension   Take 15 mLs by mouth every 6 (six) hours as needed for Indigestion.   0 12/12/2016 12/22/2016   Discharge Disposition  - in this encounter    Disposition Code Departure Means Destination   Long Term Acute Care     Other     The patient presents with essential hypertension.  The patient is tolerating the medication well and is in excellent compliance.  The patient is experiencing no side effects.  Counseling was offered regarding low salt diets.  The patient has a reduced salt intake.  The patient denies chest pain, palpitations, shortness of breath, dyspnea on exertion, left or murmur neck pain, nausea, vomiting, diaphoresis, paroxysmal nocturnal dyspnea, and orthopnea.     Visit Vitals    BP (!) 178/85 (BP Location: Right arm, Patient Position: Sitting, BP Method: Automatic)    Pulse 62    Temp 98.3 °F (36.8 °C) (Oral)    Ht 6' 4" (1.93 m)    Wt (!) 198.5 kg (437 lb 11.6 oz)    BMI 53.28 kg/m2       He has stage 4 CKD and needs a recheck with Dr. Wilson.    CMP  Sodium   Date Value Ref Range Status   02/06/2017 140 136 - 145 mmol/L Final     Potassium   Date Value Ref Range Status   02/06/2017 4.4 3.5 - 5.1 mmol/L Final "     Chloride   Date Value Ref Range Status   02/06/2017 108 95 - 110 mmol/L Final     CO2   Date Value Ref Range Status   02/06/2017 23 23 - 29 mmol/L Final     Glucose   Date Value Ref Range Status   02/06/2017 139 (H) 70 - 110 mg/dL Final     BUN, Bld   Date Value Ref Range Status   02/06/2017 32 (H) 6 - 20 mg/dL Final     Creatinine   Date Value Ref Range Status   02/06/2017 2.8 (H) 0.5 - 1.4 mg/dL Final     Calcium   Date Value Ref Range Status   02/06/2017 9.3 8.7 - 10.5 mg/dL Final     Total Protein   Date Value Ref Range Status   02/06/2017 7.7 6.0 - 8.4 g/dL Final     Albumin   Date Value Ref Range Status   02/06/2017 3.4 (L) 3.5 - 5.2 g/dL Final     Total Bilirubin   Date Value Ref Range Status   02/06/2017 0.4 0.1 - 1.0 mg/dL Final     Comment:     For infants and newborns, interpretation of results should be based  on gestational age, weight and in agreement with clinical  observations.  Premature Infant recommended reference ranges:  Up to 24 hours.............<8.0 mg/dL  Up to 48 hours............<12.0 mg/dL  3-5 days..................<15.0 mg/dL  6-29 days.................<15.0 mg/dL       Alkaline Phosphatase   Date Value Ref Range Status   02/06/2017 99 55 - 135 U/L Final     AST   Date Value Ref Range Status   02/06/2017 38 10 - 40 U/L Final     ALT   Date Value Ref Range Status   02/06/2017 27 10 - 44 U/L Final     Anion Gap   Date Value Ref Range Status   02/06/2017 9 8 - 16 mmol/L Final     eGFR if    Date Value Ref Range Status   02/06/2017 28.5 (A) >60 mL/min/1.73 m^2 Final     eGFR if non    Date Value Ref Range Status   02/06/2017 24.6 (A) >60 mL/min/1.73 m^2 Final     Comment:     Calculation used to obtain the estimated glomerular filtration  rate (eGFR) is the CKD-EPI equation. Since race is unknown   in our information system, the eGFR values for   -American and Non--American patients are given   for each creatinine result.         He also had  a transfusion and has had anemia.  \  Lab Results   Component Value Date    WBC 5.74 02/06/2017    HGB 9.0 (L) 02/06/2017    HCT 29.1 (L) 02/06/2017    MCV 97 02/06/2017     02/06/2017   he is taking iron for this above.     He has hyperglycemia.   Lab Results   Component Value Date    HGBA1C 5.7 02/23/2016     His a1c was normal in the hospital as it is documented above.     He also had hypkalemia and this is better.    He is also obese and he is on a low sodium diet.  He is not trying to lose weight.  We discussed trying to do this more.      Health Maintenance Due   Topic Date Due    Pneumococcal PCV13 (High Risk) (1 - PCV13 Required) 11/28/1964    Pneumococcal PPSV23 (High Risk) (1) 11/28/1981    Influenza Vaccine  08/01/2016       Past Medical History:  Past Medical History   Diagnosis Date    Acquired diverticulosis of colon     Allergy     Anemia      iron deficient,chronic, with paraprotenemia    Anticoagulant long-term use     Dyspnea on exertion     ED (erectile dysfunction) 05/15/12    Elevated liver enzymes     GERD (gastroesophageal reflux disease)     Hepatitis B core antibody positive 2012    Hyperglycemia     Hypertension     Hypogonadism male     Iron deficiency     Left ventricular diastolic dysfunction with preserved systolic function     Multiple myeloma, stage 1 2012    Myasthenia gravis associated with thymoma      last exacerbation around early 2011, experienced weakness    Obesity     Prostatism 05/15/12    Renal insufficiency      sees Dr. Fernandez for this.     Sleep apnea      He uses a CPAP for this. Room air    Spermatocele of epididymis, single 05/15/12     left sided cyst, had surgery    Ulcer 2011     peptic ulcer    Varicose veins      sometimes wears compression stockings     Past Surgical History   Procedure Laterality Date    Knee surgery       left    Thymectomy  2003     for myasthenia gravis    Cystoscopy w/ decannulation       Esophagogastroduodenoscopy  2011     He had ulcers.    Spermatocelectomy  05/15/12     Left side    Colonoscopy  9/17/2012     was normal except for diverticulosis by Dr. Murdock     Joint replacement       knee     Review of patient's allergies indicates:   Allergen Reactions    Bystolic [nebivolol] Other (See Comments)     Severe hypotension    Lisinopril      Other reaction(s): chronic cough    Lortab [hydrocodone-acetaminophen] Other (See Comments)     Weak  & dizziness     Current Outpatient Prescriptions on File Prior to Visit   Medication Sig Dispense Refill    amlodipine (NORVASC) 10 MG tablet Take 1 tablet (10 mg total) by mouth once daily. 90 tablet 3    ascorbic acid (VITAMIN C) 1000 MG tablet Take 1,000 mg by mouth once daily.      aspirin 325 MG tablet Take 325 mg by mouth 2 (two) times daily.       ferrous sulfate 325 (65 FE) MG EC tablet Take 325 mg by mouth once daily. Not taking      fluticasone (FLONASE) 50 mcg/actuation nasal spray 1 spray by Each Nare route once daily.      hydrALAZINE (APRESOLINE) 25 MG tablet Take 1 tablet (25 mg total) by mouth every 12 (twelve) hours. 180 tablet 3    multivitamin (THERAGRAN) tablet Take 1 tablet by mouth.      pyridostigmine (MESTINON) 60 mg Tab Take 2 tablets 3-4 times daily 240 tablet 11    vitamin D 1000 units Tab Take 185 mg by mouth once daily. 2 tablets once daily in the morning with food       No current facility-administered medications on file prior to visit.      Social History     Social History    Marital status:      Spouse name: N/A    Number of children: N/A    Years of education: N/A     Occupational History    Not on file.     Social History Main Topics    Smoking status: Never Smoker    Smokeless tobacco: Never Used    Alcohol use No    Drug use: No    Sexual activity: Not on file     Other Topics Concern    Not on file     Social History Narrative     Family History   Problem Relation Age of Onset    Diabetes  "Mother     Hypertension Mother     Heart failure Mother     Diabetes Mellitus Mother     Prostate cancer Father     Hypertension Father     Lung cancer Father     Diabetes Sister     Hypertension Sister     Diabetes Mellitus Sister     Prostate cancer Paternal Uncle     Hypertension Brother     Stroke Neg Hx     Heart attack Neg Hx            Review of Systems   Constitutional: Negative for chills and fever.   Respiratory: Positive for cough. Negative for shortness of breath and wheezing.    Cardiovascular: Negative for chest pain and palpitations.   Gastrointestinal: Negative for abdominal pain.   Genitourinary: Negative for dysuria and hematuria.       Objective:     Visit Vitals    BP (!) 178/85 (BP Location: Right arm, Patient Position: Sitting, BP Method: Automatic)    Pulse 62    Temp 98.3 °F (36.8 °C) (Oral)    Ht 6' 4" (1.93 m)    Wt (!) 198.5 kg (437 lb 11.6 oz)    BMI 53.28 kg/m2       Physical Exam   Constitutional: He appears well-developed and well-nourished.   Cardiovascular: Normal rate, regular rhythm and normal heart sounds.  Exam reveals no gallop and no friction rub.    No murmur heard.  Pulmonary/Chest: Effort normal and breath sounds normal. No respiratory distress. He has no wheezes. He has no rales.   Musculoskeletal: He exhibits edema.   Skin:   He has sores on his legs that were not examined as he had the wrap from the wound care people who are following him.       Assessment:     1. Essential hypertension    2. CKD stage 4 secondary to hypertension    3. Anemia of renal disease    4. Iron deficiency anemia due to chronic blood loss    5. Chronic venous hypertension with ulcer involving right side    6. Hyperglycemia    7. Morbid obesity, unspecified obesity type        Plan:   Leodan was seen today for follow-up.    Diagnoses and all orders for this visit:    Essential hypertension  -     hydrALAZINE (APRESOLINE) 50 MG tablet; Take 1 tablet (50 mg total) by mouth every 12 " (twelve) hours.    CKD stage 4 secondary to hypertension    Anemia of renal disease    Iron deficiency anemia due to chronic blood loss    Chronic venous hypertension with ulcer involving right side    Hyperglycemia    Morbid obesity, unspecified obesity type      Cont f/u with Dr. Wilson for his kidneys and labs.  He is to cont with wound care.  See me in3  Weeks for a bp check on the change in med.

## 2017-02-17 NOTE — MR AVS SNAPSHOT
[facility], [place]  [address] [address]  Phone: [phone]  Fax: [phone]                  Leodan Dan   2017 10:00 AM   Office Visit    Description:  Male : 1965   Provider:  Juan Stevenson MD   Department:  [facility], [place]           Reason for Visit     Follow-up           Diagnoses this Visit        Comments    Essential hypertension    -  Primary     CKD stage 4 secondary to hypertension         Anemia of renal disease         Iron deficiency anemia due to chronic blood loss         Chronic venous hypertension with ulcer involving right side         Hyperglycemia         Morbid obesity, unspecified obesity type                To Do List           Future Appointments        Provider Department Dept Phone    2017 3:20 PM Nikhil Vela NP [facility] [phone]    3/10/2017 11:00 AM Juan Stevenson MD [facility] [place] [phone]      Goals (5 Years of Data)     None      Follow-Up and Disposition     Return in about 3 weeks (around 3/10/2017) for blood pressure check.    Follow-up and Disposition History       These Medications        Disp Refills Start End    hydrALAZINE (APRESOLINE) 50 MG tablet 60 tablet 11 2017    Take 1 tablet (50 mg total) by mouth every 12 (twelve) hours. - Oral    Pharmacy: [facility] [address] [place] [address] #: [phone]         [facility]     [facility] On Call Nurse Care Line -  Assistance  Registered nurses in the [facility] On Call Center provide clinical advisement, health education, appointment booking, and other advisory services.  Call for this free service at [phone].             Medications           Message regarding Medications     Verify the changes and/or additions to your medication regime listed below are the same as discussed with your clinician today.  If any of these changes or additions are incorrect,  "please notify your healthcare provider.        START taking these NEW medications        Refills    hydrALAZINE (APRESOLINE) 50 MG tablet 11    Sig: Take 1 tablet (50 mg total) by mouth every 12 (twelve) hours.    Class: Normal    Route: Oral           Verify that the below list of medications is an accurate representation of the medications you are currently taking.  If none reported, the list may be blank. If incorrect, please contact your healthcare provider. Carry this list with you in case of emergency.           Current Medications     amlodipine (NORVASC) 10 MG tablet Take 1 tablet (10 mg total) by mouth once daily.    ascorbic acid (VITAMIN C) 1000 MG tablet Take 1,000 mg by mouth once daily.    aspirin 325 MG tablet Take 325 mg by mouth 2 (two) times daily.     ferrous sulfate 325 (65 FE) MG EC tablet Take 325 mg by mouth once daily. Not taking    fluticasone (FLONASE) 50 mcg/actuation nasal spray 1 spray by Each Nare route once daily.    furosemide (LASIX) 20 MG tablet Take 20 mg by mouth once daily.    multivitamin (THERAGRAN) tablet Take 1 tablet by mouth.    pyridostigmine (MESTINON) 60 mg Tab Take 2 tablets 3-4 times daily    vitamin D 1000 units Tab Take 185 mg by mouth once daily. 2 tablets once daily in the morning with food    hydrALAZINE (APRESOLINE) 50 MG tablet Take 1 tablet (50 mg total) by mouth every 12 (twelve) hours.           Clinical Reference Information           Your Vitals Were     BP Pulse Temp Height Weight BMI    178/85 (BP Location: Right arm, Patient Position: Sitting, BP Method: Automatic) 62 98.3 °F (36.8 °C) (Oral) 6' 4" (1.93 m) 198.5 kg (437 lb 11.6 oz) 53.28 kg/m2      Blood Pressure          Most Recent Value    BP  (!)  178/85      Allergies as of 2/17/2017     Bystolic [Nebivolol]    Lisinopril    Lortab [Hydrocodone-acetaminophen]      Immunizations Administered on Date of Encounter - 2/17/2017     Name Date Dose VIS Date Route    Pneumococcal Conjugate - 13 Valent  " Incomplete 0.5 mL 11/5/2015 Intramuscular      Orders Placed During Today's Visit      Normal Orders This Visit    Pneumococcal Conjugate Vaccine (13 Valent) (IM)       Language Assistance Services     ATTENTION: Language assistance services are available, free of charge. Please call [phone].      ATENCIÓN: Si habla bo, tiene a forde disposición servicios gratuitos de asistencia lingüística. Llame al [phone].     CHÚ Ý: N?u b?n nói Ti?ng Vi?t, có các d?ch v? h? tr? ngôn ng? mi?n phí dành cho b?n. G?i s? [phone].         [facility] complies with applicable Federal civil rights laws and does not discriminate on the basis of race, color, national origin, age, disability, or sex.

## 2017-02-24 ENCOUNTER — PATIENT OUTREACH (OUTPATIENT)
Dept: ADMINISTRATIVE | Facility: HOSPITAL | Age: 52
End: 2017-02-24
Payer: COMMERCIAL

## 2017-02-24 DIAGNOSIS — I10 ESSENTIAL HYPERTENSION: Primary | ICD-10-CM

## 2017-02-24 NOTE — LETTER
February 24, 2017    Leodan Dan  [address]  [place] [address]           [facility]  [address]  [facility] [address]  Phone: [phone] Dear Mr. Dan:    [facility] is committed to your overall health.  To help you get the most out of each of your visits, we will review your information to make sure you are up to date on all of your recommended tests and/or procedures.      Juan Stevenson MD has found that you may be due for   Health Maintenance Due   Topic    Pneumococcal PCV13 (High Risk) (1 - PCV13 Required)    Pneumococcal PPSV23 (High Risk) (1)    Influenza Vaccine     Lipid Panel       If you have had any of the above done at another facility, please bring the records or information with you so that your record at [facility] will be complete.    If you are currently taking medication, please bring it with you to your appointment for review.    We will be happy to assist you with scheduling any necessary appointments or you may contact the [facility] appointment desk at [phone] to schedule at your convenience.     Thank you for choosing [facility] for your healthcare needs,      If you have any questions or concerns, please don't hesitate to call.    Sincerely,    Mariluz NIETO LPN  Care Coordination Department  [facility]

## 2017-03-14 ENCOUNTER — TELEPHONE (OUTPATIENT)
Dept: HEMATOLOGY/ONCOLOGY | Facility: CLINIC | Age: 52
End: 2017-03-14

## 2017-03-14 NOTE — TELEPHONE ENCOUNTER
Message left for patient to call [phone] to reschedule appointment that was missed on 2/24/17.

## 2017-05-17 ENCOUNTER — LAB VISIT (OUTPATIENT)
Dept: LAB | Facility: HOSPITAL | Age: 52
End: 2017-05-17
Attending: INTERNAL MEDICINE
Payer: COMMERCIAL

## 2017-05-17 DIAGNOSIS — N18.30 CHRONIC KIDNEY DISEASE, STAGE III (MODERATE): Primary | ICD-10-CM

## 2017-05-17 DIAGNOSIS — E66.01 MORBID OBESITY: ICD-10-CM

## 2017-05-17 DIAGNOSIS — R80.8 NEPHROGENOUS PROTEINURIA: ICD-10-CM

## 2017-05-17 DIAGNOSIS — N25.81 SECONDARY HYPERPARATHYROIDISM OF RENAL ORIGIN: ICD-10-CM

## 2017-05-17 DIAGNOSIS — N18.9 ANEMIA IN CHRONIC KIDNEY DISEASE(285.21): ICD-10-CM

## 2017-05-17 DIAGNOSIS — I10 ESSENTIAL HYPERTENSION, MALIGNANT: ICD-10-CM

## 2017-05-17 DIAGNOSIS — D63.1 ANEMIA IN CHRONIC KIDNEY DISEASE(285.21): ICD-10-CM

## 2017-05-17 LAB
ALBUMIN SERPL BCP-MCNC: 3.4 G/DL
ALP SERPL-CCNC: 67 U/L
ALT SERPL W/O P-5'-P-CCNC: 19 U/L
ANION GAP SERPL CALC-SCNC: 11 MMOL/L
AST SERPL-CCNC: 43 U/L
BACTERIA #/AREA URNS HPF: ABNORMAL /HPF
BASOPHILS # BLD AUTO: 0.03 K/UL
BASOPHILS NFR BLD: 0.5 %
BILIRUB SERPL-MCNC: 0.5 MG/DL
BILIRUB UR QL STRIP: NEGATIVE
BUN SERPL-MCNC: 33 MG/DL
CALCIUM SERPL-MCNC: 9.3 MG/DL
CHLORIDE SERPL-SCNC: 109 MMOL/L
CLARITY UR: CLEAR
CO2 SERPL-SCNC: 21 MMOL/L
COLOR UR: YELLOW
CREAT SERPL-MCNC: 2.8 MG/DL
CREAT UR-MCNC: 128 MG/DL
DIFFERENTIAL METHOD: ABNORMAL
EOSINOPHIL # BLD AUTO: 0.4 K/UL
EOSINOPHIL NFR BLD: 6 %
ERYTHROCYTE [DISTWIDTH] IN BLOOD BY AUTOMATED COUNT: 13.7 %
EST. GFR  (AFRICAN AMERICAN): 28.5 ML/MIN/1.73 M^2
EST. GFR  (NON AFRICAN AMERICAN): 24.6 ML/MIN/1.73 M^2
GLUCOSE SERPL-MCNC: 144 MG/DL
GLUCOSE UR QL STRIP: NEGATIVE
HCT VFR BLD AUTO: 30.8 %
HGB BLD-MCNC: 9.9 G/DL
HGB UR QL STRIP: ABNORMAL
HYALINE CASTS #/AREA URNS LPF: 2 /LPF
KETONES UR QL STRIP: NEGATIVE
LEUKOCYTE ESTERASE UR QL STRIP: NEGATIVE
LYMPHOCYTES # BLD AUTO: 2.1 K/UL
LYMPHOCYTES NFR BLD: 32.2 %
MCH RBC QN AUTO: 30.6 PG
MCHC RBC AUTO-ENTMCNC: 32.1 %
MCV RBC AUTO: 95 FL
MICROSCOPIC COMMENT: ABNORMAL
MONOCYTES # BLD AUTO: 0.6 K/UL
MONOCYTES NFR BLD: 8.6 %
NEUTROPHILS # BLD AUTO: 3.4 K/UL
NEUTROPHILS NFR BLD: 52.5 %
NITRITE UR QL STRIP: NEGATIVE
PH UR STRIP: 6 [PH] (ref 5–8)
PHOSPHATE SERPL-MCNC: 3 MG/DL
PLATELET # BLD AUTO: 263 K/UL
PMV BLD AUTO: 10.7 FL
POTASSIUM SERPL-SCNC: 4.1 MMOL/L
PROT SERPL-MCNC: 7 G/DL
PROT UR QL STRIP: ABNORMAL
PROT UR-MCNC: 576 MG/DL
PROT/CREAT RATIO, UR: 4.5
PTH-INTACT SERPL-MCNC: 149 PG/ML
RBC # BLD AUTO: 3.24 M/UL
RBC #/AREA URNS HPF: 1 /HPF (ref 0–4)
SODIUM SERPL-SCNC: 141 MMOL/L
SP GR UR STRIP: 1.02 (ref 1–1.03)
SQUAMOUS #/AREA URNS HPF: ABNORMAL /HPF
URN SPEC COLLECT METH UR: ABNORMAL
WBC # BLD AUTO: 6.53 K/UL
WBC #/AREA URNS HPF: 0 /HPF (ref 0–5)

## 2017-05-17 PROCEDURE — 82570 ASSAY OF URINE CREATININE: CPT

## 2017-05-17 PROCEDURE — 80053 COMPREHEN METABOLIC PANEL: CPT

## 2017-05-17 PROCEDURE — 81000 URINALYSIS NONAUTO W/SCOPE: CPT | Mod: PO

## 2017-05-17 PROCEDURE — 36415 COLL VENOUS BLD VENIPUNCTURE: CPT | Mod: PO

## 2017-05-17 PROCEDURE — 85025 COMPLETE CBC W/AUTO DIFF WBC: CPT

## 2017-05-17 PROCEDURE — 83970 ASSAY OF PARATHORMONE: CPT

## 2017-05-17 PROCEDURE — 84100 ASSAY OF PHOSPHORUS: CPT

## 2017-06-07 RX ORDER — PYRIDOSTIGMINE BROMIDE 60 MG/1
TABLET ORAL
Qty: 180 TABLET | Refills: 2 | Status: SHIPPED | OUTPATIENT
Start: 2017-06-07 | End: 2017-10-17 | Stop reason: SDUPTHER

## 2017-06-27 ENCOUNTER — TELEPHONE (OUTPATIENT)
Dept: HEMATOLOGY/ONCOLOGY | Facility: CLINIC | Age: 52
End: 2017-06-27

## 2017-06-27 NOTE — TELEPHONE ENCOUNTER
----- Message from Shanta Estrella sent at 6/27/2017  2:44 PM CDT -----  Schedule an appointment.  Please call patient at [phone].

## 2017-06-29 ENCOUNTER — TELEPHONE (OUTPATIENT)
Dept: HEMATOLOGY/ONCOLOGY | Facility: CLINIC | Age: 52
End: 2017-06-29

## 2017-06-29 NOTE — TELEPHONE ENCOUNTER
----- Message from Lexie Lord sent at 6/29/2017  8:31 AM CDT -----  Contact: patient  Patient calling to schedule an appt. Please advise.  Call back .  Thanks!

## 2017-07-03 ENCOUNTER — LAB VISIT (OUTPATIENT)
Dept: LAB | Facility: HOSPITAL | Age: 52
End: 2017-07-03
Attending: NURSE PRACTITIONER
Payer: COMMERCIAL

## 2017-07-03 DIAGNOSIS — I10 ESSENTIAL HYPERTENSION: ICD-10-CM

## 2017-07-03 DIAGNOSIS — N18.9 ANEMIA IN CHRONIC KIDNEY DISEASE(285.21): ICD-10-CM

## 2017-07-03 DIAGNOSIS — N25.81 SECONDARY HYPERPARATHYROIDISM OF RENAL ORIGIN: ICD-10-CM

## 2017-07-03 DIAGNOSIS — E66.01 MORBID OBESITY: ICD-10-CM

## 2017-07-03 DIAGNOSIS — N18.4 CHRONIC KIDNEY DISEASE, STAGE IV (SEVERE): Primary | ICD-10-CM

## 2017-07-03 DIAGNOSIS — C90.01 MULTIPLE MYELOMA IN REMISSION: ICD-10-CM

## 2017-07-03 DIAGNOSIS — C90.00 MULTIPLE MYELOMA, REMISSION STATUS UNSPECIFIED: ICD-10-CM

## 2017-07-03 DIAGNOSIS — I10 ESSENTIAL HYPERTENSION, MALIGNANT: ICD-10-CM

## 2017-07-03 DIAGNOSIS — R80.8 NEPHROGENOUS PROTEINURIA: ICD-10-CM

## 2017-07-03 DIAGNOSIS — C90.00 MULTIPLE MYELOMA, REMISSION STATUS UNSPECIFIED: Primary | ICD-10-CM

## 2017-07-03 DIAGNOSIS — D63.1 ANEMIA IN CHRONIC KIDNEY DISEASE(285.21): ICD-10-CM

## 2017-07-03 LAB
ALBUMIN SERPL BCP-MCNC: 3.1 G/DL
ALP SERPL-CCNC: 75 U/L
ALT SERPL W/O P-5'-P-CCNC: 27 U/L
ANION GAP SERPL CALC-SCNC: 8 MMOL/L
AST SERPL-CCNC: 38 U/L
BACTERIA #/AREA URNS HPF: ABNORMAL /HPF
BASOPHILS # BLD AUTO: 0.03 K/UL
BASOPHILS NFR BLD: 0.4 %
BILIRUB SERPL-MCNC: 0.3 MG/DL
BILIRUB UR QL STRIP: NEGATIVE
BUN SERPL-MCNC: 33 MG/DL
CALCIUM SERPL-MCNC: 9.3 MG/DL
CHLORIDE SERPL-SCNC: 106 MMOL/L
CHOLEST/HDLC SERPL: 6.8 {RATIO}
CLARITY UR: CLEAR
CO2 SERPL-SCNC: 23 MMOL/L
COLOR UR: YELLOW
CREAT SERPL-MCNC: 2.8 MG/DL
CREAT UR-MCNC: 222 MG/DL
DIFFERENTIAL METHOD: ABNORMAL
EOSINOPHIL # BLD AUTO: 0.6 K/UL
EOSINOPHIL NFR BLD: 7.5 %
ERYTHROCYTE [DISTWIDTH] IN BLOOD BY AUTOMATED COUNT: 13.5 %
EST. GFR  (AFRICAN AMERICAN): 28.5 ML/MIN/1.73 M^2
EST. GFR  (NON AFRICAN AMERICAN): 24.6 ML/MIN/1.73 M^2
FERRITIN SERPL-MCNC: 1105 NG/ML
GLUCOSE SERPL-MCNC: 120 MG/DL
GLUCOSE UR QL STRIP: NEGATIVE
GRAN CASTS #/AREA URNS LPF: 2 /LPF
HCT VFR BLD AUTO: 31.9 %
HDL/CHOLESTEROL RATIO: 14.6 %
HDLC SERPL-MCNC: 267 MG/DL
HDLC SERPL-MCNC: 39 MG/DL
HGB BLD-MCNC: 10.3 G/DL
HGB UR QL STRIP: ABNORMAL
HYALINE CASTS #/AREA URNS LPF: 3 /LPF
IRON SERPL-MCNC: 66 UG/DL
KETONES UR QL STRIP: NEGATIVE
LDH SERPL L TO P-CCNC: 184 U/L
LDLC SERPL CALC-MCNC: 165.2 MG/DL
LEUKOCYTE ESTERASE UR QL STRIP: NEGATIVE
LYMPHOCYTES # BLD AUTO: 2.2 K/UL
LYMPHOCYTES NFR BLD: 29.9 %
MAGNESIUM SERPL-MCNC: 2.1 MG/DL
MCH RBC QN AUTO: 30 PG
MCHC RBC AUTO-ENTMCNC: 32.3 %
MCV RBC AUTO: 93 FL
MICROSCOPIC COMMENT: ABNORMAL
MONOCYTES # BLD AUTO: 0.8 K/UL
MONOCYTES NFR BLD: 10.6 %
NEUTROPHILS # BLD AUTO: 3.8 K/UL
NEUTROPHILS NFR BLD: 51.6 %
NITRITE UR QL STRIP: NEGATIVE
NONHDLC SERPL-MCNC: 228 MG/DL
PH UR STRIP: 6 [PH] (ref 5–8)
PHOSPHATE SERPL-MCNC: 2.9 MG/DL
PLATELET # BLD AUTO: 263 K/UL
PMV BLD AUTO: 9.6 FL
POTASSIUM SERPL-SCNC: 4.1 MMOL/L
PROT SERPL-MCNC: 6.9 G/DL
PROT UR QL STRIP: ABNORMAL
PROT UR-MCNC: 768 MG/DL
PROT/CREAT RATIO, UR: 3.46
RBC # BLD AUTO: 3.43 M/UL
RBC #/AREA URNS HPF: 1 /HPF (ref 0–4)
SATURATED IRON: 23 %
SODIUM SERPL-SCNC: 137 MMOL/L
SP GR UR STRIP: 1.02 (ref 1–1.03)
SQUAMOUS #/AREA URNS HPF: 2 /HPF
TOTAL IRON BINDING CAPACITY: 283 UG/DL
TRANSFERRIN SERPL-MCNC: 191 MG/DL
TRIGL SERPL-MCNC: 314 MG/DL
URN SPEC COLLECT METH UR: ABNORMAL
WBC # BLD AUTO: 7.35 K/UL
WBC #/AREA URNS HPF: 2 /HPF (ref 0–5)

## 2017-07-03 PROCEDURE — 83540 ASSAY OF IRON: CPT

## 2017-07-03 PROCEDURE — 84165 PROTEIN E-PHORESIS SERUM: CPT

## 2017-07-03 PROCEDURE — 84165 PROTEIN E-PHORESIS SERUM: CPT | Mod: 26,,, | Performed by: PATHOLOGY

## 2017-07-03 PROCEDURE — 80061 LIPID PANEL: CPT

## 2017-07-03 PROCEDURE — 86334 IMMUNOFIX E-PHORESIS SERUM: CPT

## 2017-07-03 PROCEDURE — 82728 ASSAY OF FERRITIN: CPT

## 2017-07-03 PROCEDURE — 82232 ASSAY OF BETA-2 PROTEIN: CPT

## 2017-07-03 PROCEDURE — 80053 COMPREHEN METABOLIC PANEL: CPT

## 2017-07-03 PROCEDURE — 81000 URINALYSIS NONAUTO W/SCOPE: CPT | Mod: PO

## 2017-07-03 PROCEDURE — 83615 LACTATE (LD) (LDH) ENZYME: CPT

## 2017-07-03 PROCEDURE — 85025 COMPLETE CBC W/AUTO DIFF WBC: CPT | Mod: PO

## 2017-07-03 PROCEDURE — 86334 IMMUNOFIX E-PHORESIS SERUM: CPT | Mod: 26,,, | Performed by: PATHOLOGY

## 2017-07-03 PROCEDURE — 36415 COLL VENOUS BLD VENIPUNCTURE: CPT | Mod: PO

## 2017-07-03 PROCEDURE — 83735 ASSAY OF MAGNESIUM: CPT

## 2017-07-03 PROCEDURE — 84156 ASSAY OF PROTEIN URINE: CPT

## 2017-07-03 PROCEDURE — 83520 IMMUNOASSAY QUANT NOS NONAB: CPT

## 2017-07-03 PROCEDURE — 84100 ASSAY OF PHOSPHORUS: CPT

## 2017-07-05 ENCOUNTER — LAB VISIT (OUTPATIENT)
Dept: LAB | Facility: HOSPITAL | Age: 52
End: 2017-07-05
Attending: INTERNAL MEDICINE
Payer: COMMERCIAL

## 2017-07-05 DIAGNOSIS — N18.4 CHRONIC KIDNEY DISEASE, STAGE IV (SEVERE): Primary | ICD-10-CM

## 2017-07-05 DIAGNOSIS — N25.81 SECONDARY HYPERPARATHYROIDISM OF RENAL ORIGIN: ICD-10-CM

## 2017-07-05 DIAGNOSIS — C90.01 MULTIPLE MYELOMA IN REMISSION: ICD-10-CM

## 2017-07-05 DIAGNOSIS — I10 ESSENTIAL HYPERTENSION, MALIGNANT: ICD-10-CM

## 2017-07-05 DIAGNOSIS — N18.9 ANEMIA IN CHRONIC KIDNEY DISEASE(285.21): ICD-10-CM

## 2017-07-05 DIAGNOSIS — R80.8 NEPHROGENOUS PROTEINURIA: ICD-10-CM

## 2017-07-05 DIAGNOSIS — E66.01 MORBID OBESITY: ICD-10-CM

## 2017-07-05 DIAGNOSIS — D63.1 ANEMIA IN CHRONIC KIDNEY DISEASE(285.21): ICD-10-CM

## 2017-07-05 LAB
ALBUMIN SERPL ELPH-MCNC: 3.4 G/DL
ALPHA1 GLOB SERPL ELPH-MCNC: 0.31 G/DL
ALPHA2 GLOB SERPL ELPH-MCNC: 0.93 G/DL
B-GLOBULIN SERPL ELPH-MCNC: 0.87 G/DL
B2 MICROGLOB SERPL-MCNC: 5.2 UG/ML
GAMMA GLOB SERPL ELPH-MCNC: 0.99 G/DL
INTERPRETATION SERPL IFE-IMP: NORMAL
KAPPA LC SER QL IA: 4.03 MG/DL
KAPPA LC/LAMBDA SER IA: 1.09
LAMBDA LC SER QL IA: 3.7 MG/DL
PATHOLOGIST INTERPRETATION IFE: NORMAL
PATHOLOGIST INTERPRETATION SPE: NORMAL
PROT SERPL-MCNC: 6.5 G/DL

## 2017-07-05 PROCEDURE — 84166 PROTEIN E-PHORESIS/URINE/CSF: CPT | Mod: 26,,, | Performed by: PATHOLOGY

## 2017-07-05 PROCEDURE — 84156 ASSAY OF PROTEIN URINE: CPT

## 2017-07-05 PROCEDURE — 82575 CREATININE CLEARANCE TEST: CPT

## 2017-07-05 PROCEDURE — 84166 PROTEIN E-PHORESIS/URINE/CSF: CPT

## 2017-07-05 PROCEDURE — 86335 IMMUNFIX E-PHORSIS/URINE/CSF: CPT

## 2017-07-05 PROCEDURE — 86335 IMMUNFIX E-PHORSIS/URINE/CSF: CPT | Mod: 26,,, | Performed by: PATHOLOGY

## 2017-07-06 LAB
CREAT CL/1.73 SQ M 12H UR+SERPL-ARVRAT: 93 ML/MIN
CREAT SERPL-MCNC: 3.2 MG/DL
CREAT UR-MCNC: 116 MG/DL
CREATININE, URINE (MG/SPEC): 4292 MG/SPEC
PATHOLOGIST INTERPRETATION UPE: NORMAL
PROT 24H UR-MRATE: ABNORMAL MG/SPEC
PROT PATTERN UR ELPH-IMP: NORMAL
PROT UR-MCNC: 367 MG/DL
URINE COLLECTION DURATION: 24 HR
URINE COLLECTION DURATION: 24 HR
URINE VOLUME: 3700 ML
URINE VOLUME: 3700 ML

## 2017-07-10 LAB
INTERPRETATION UR IFE-IMP: NORMAL
PATHOLOGIST INTERPRETATION UIFE: NORMAL

## 2017-07-11 ENCOUNTER — OFFICE VISIT (OUTPATIENT)
Dept: HEMATOLOGY/ONCOLOGY | Facility: CLINIC | Age: 52
End: 2017-07-11
Payer: COMMERCIAL

## 2017-07-11 VITALS
HEIGHT: 76 IN | TEMPERATURE: 98 F | RESPIRATION RATE: 20 BRPM | SYSTOLIC BLOOD PRESSURE: 177 MMHG | HEART RATE: 67 BPM | DIASTOLIC BLOOD PRESSURE: 104 MMHG | BODY MASS INDEX: 38.36 KG/M2 | WEIGHT: 315 LBS

## 2017-07-11 DIAGNOSIS — I10 ESSENTIAL HYPERTENSION: ICD-10-CM

## 2017-07-11 DIAGNOSIS — C90.01 MULTIPLE MYELOMA IN REMISSION: Primary | ICD-10-CM

## 2017-07-11 PROCEDURE — 99213 OFFICE O/P EST LOW 20 MIN: CPT | Mod: S$GLB,,, | Performed by: NURSE PRACTITIONER

## 2017-07-11 PROCEDURE — 99999 PR PBB SHADOW E&M-EST. PATIENT-LVL IV: CPT | Mod: PBBFAC,,, | Performed by: NURSE PRACTITIONER

## 2017-07-11 RX ORDER — LOSARTAN POTASSIUM 100 MG/1
100 TABLET ORAL DAILY
COMMUNITY
End: 2019-04-12

## 2017-07-11 NOTE — PROGRESS NOTES
HISTORY OF PRESENT ILLNESS:  This is a 51-year-old black gentleman known to Dr. Iqbal for multiple myeloma that went into remission following therapy with   ZRD.  He had a protracted course of maintenance Revlimid before discontinuation   of therapy and is now observed.  He presents late to the clinic for interval followup with no   complaints.  He reports that he was admitted to [facility] from December 2016 - January/2017   for a lung infection from not cleaning his CPAP tubing.  His new left TKA was affected and   needed to be re-opened.  He denies any fevers, chills, drenching night sweats, lymphadenopathy,   abdominal discomfort, nausea, vomiting, constipation, diarrhea, rashes, pruritus, painful   lymphadenopathy, etc.  He states his BP is being regulated by Dr. Greg Anderson in [facility].  His BP this am was 167/95.  No other new complaints or findings on a 14 point  review of systems.    PHYSICAL EXAMINATION:  GENERAL:  Well-developed, morbidly obese, black gentleman in no acute distress.    Alert and oriented x3.  VITAL SIGNS:  Weight 469.4 pounds (gain of 5 pounds in 13 months), /104,   pulse 67, respirations 20, temperature 98.4.  HEENT:  Normocephalic, atraumatic.  Oral mucosa pink and moist.  Lips without   lesions.  Tongue midline.  Oropharynx clear.  Nonicteric sclerae.   NECK:  Supple.  No adenopathy.                                               HEART:  Regular rate and rhythm without murmur, gallop or rub.               LUNGS:  Clear to auscultation bilaterally.                                   ABDOMEN:  Soft, nontender and nondistended with positive normoactive bowel   sounds.  No hepatosplenomegaly.                                              EXTREMITIES:  No cyanosis, clubbing or edema.  Distal pulses are intact.     Right foot with dressing dry and intact. No erythema or drainage.  AXILLAE AND GROIN:  No palpable lymphadenopathy appreciated.    LABORATORY:    Lab  Results   Component Value Date    WBC 7.35 07/03/2017    HGB 10.3 (L) 07/03/2017    HCT 31.9 (L) 07/03/2017    MCV 93 07/03/2017     07/03/2017     Unremarkable differential    CMP  Sodium   Date Value Ref Range Status   07/03/2017 137 136 - 145 mmol/L Final     Potassium   Date Value Ref Range Status   07/03/2017 4.1 3.5 - 5.1 mmol/L Final     Chloride   Date Value Ref Range Status   07/03/2017 106 95 - 110 mmol/L Final     CO2   Date Value Ref Range Status   07/03/2017 23 23 - 29 mmol/L Final     Glucose   Date Value Ref Range Status   07/03/2017 120 (H) 70 - 110 mg/dL Final     BUN, Bld   Date Value Ref Range Status   07/03/2017 33 (H) 6 - 20 mg/dL Final     Creatinine   Date Value Ref Range Status   07/05/2017 3.2 (H) 0.5 - 1.4 mg/dL Final     Calcium   Date Value Ref Range Status   07/03/2017 9.3 8.7 - 10.5 mg/dL Final     Total Protein   Date Value Ref Range Status   07/03/2017 6.9 6.0 - 8.4 g/dL Final     Albumin   Date Value Ref Range Status   07/03/2017 3.1 (L) 3.5 - 5.2 g/dL Final     Total Bilirubin   Date Value Ref Range Status   07/03/2017 0.3 0.1 - 1.0 mg/dL Final     Comment:     For infants and newborns, interpretation of results should be based  on gestational age, weight and in agreement with clinical  observations.  Premature Infant recommended reference ranges:  Up to 24 hours.............<8.0 mg/dL  Up to 48 hours............<12.0 mg/dL  3-5 days..................<15.0 mg/dL  6-29 days.................<15.0 mg/dL       Alkaline Phosphatase   Date Value Ref Range Status   07/03/2017 75 55 - 135 U/L Final     AST   Date Value Ref Range Status   07/03/2017 38 10 - 40 U/L Final     ALT   Date Value Ref Range Status   07/03/2017 27 10 - 44 U/L Final     Anion Gap   Date Value Ref Range Status   07/03/2017 8 8 - 16 mmol/L Final     eGFR if    Date Value Ref Range Status   07/03/2017 28.5 (A) >60 mL/min/1.73 m^2 Final     eGFR if non    Date Value Ref Range Status    07/03/2017 24.6 (A) >60 mL/min/1.73 m^2 Final     Comment:     Calculation used to obtain the estimated glomerular filtration  rate (eGFR) is the CKD-EPI equation. Since race is unknown   in our information system, the eGFR values for   -American and Non--American patients are given   for each creatinine result.        ,  Magnesium 2.1, Beta-2 microglobulin 5.2 (6.3).      Free light chain analysis; kappa/lambda ratio at 1.09 (1.26, 1.43, 1.55).    SPEP impression:  Normal total protein, no monoclonal peaks identified.    KATELYN impression:  No monoclonal peaks identified.    IMPRESSION:  1.  Multiple myeloma - no evidence of relapse.  2.  HTN - followed by Dr. EMILIA Anderson ([facility])  2.  Chronic disease physiology anemia - continue iron supplementation.  3.  Stage III chronic kidney disease with declining function - follow up with Dr. Fernandez/Katie  4.  Diabetes mellitus.  5.  Right foot ulcer - under wound care  6.  Status post TKA, left.    PLAN:  In regards to #1, continue observation and have the patient return in three months   with interval CBC, CMP, LDH, magnesium, SPEP, KATELYN, beta-2 microglobulin and   free light chain analysis prior to appointment.      Assessment/plan reviewed and approved by Dr. Iqbal.

## 2017-07-14 ENCOUNTER — TELEPHONE (OUTPATIENT)
Dept: FAMILY MEDICINE | Facility: CLINIC | Age: 52
End: 2017-07-14

## 2017-07-14 DIAGNOSIS — E78.5 HYPERLIPIDEMIA, UNSPECIFIED HYPERLIPIDEMIA TYPE: Primary | ICD-10-CM

## 2017-07-14 RX ORDER — ATORVASTATIN CALCIUM 40 MG/1
40 TABLET, FILM COATED ORAL DAILY
Qty: 90 TABLET | Refills: 3 | Status: SHIPPED | OUTPATIENT
Start: 2017-07-14 | End: 2018-04-25

## 2017-07-14 NOTE — TELEPHONE ENCOUNTER
Pt notified of Lipid results. Lipid and liver function test in 3 months. Liptior 40mg daily x 1 year. Orders pending.

## 2017-07-14 NOTE — PROGRESS NOTES
The LDL is high and the risk of a heart attack is 20% in the next 10 years and it is supposed to be less than 7.5%.   I recommend that lipitor 40 mg po q day #30 and rf x 11 and recheck the lipid and liver in 3 months.      The 10-year ASCVD risk score (Mary TOBAR Jr., et al., 2013) is: 20.7%    Values used to calculate the score:      Age: 51 years      Sex: Male      Is Non- : Yes      Diabetic: No      Tobacco smoker: No      Systolic Blood Pressure: 177 mmHg      Is BP treated: Yes      HDL Cholesterol: 39 mg/dL      Total Cholesterol: 267 mg/dL

## 2017-07-17 NOTE — TELEPHONE ENCOUNTER
I have signed for the following orders AND/OR meds.  Please call the patient and ask the patient to schedule the testing AND/OR inform about any medications that were sent.      Orders Placed This Encounter   Procedures    Lipid panel     Standing Status:   Future     Standing Expiration Date:   9/12/2018    Hepatic function panel     Standing Status:   Future     Standing Expiration Date:   9/12/2018         Medications Ordered This Encounter      atorvastatin (LIPITOR) 40 MG tablet          Sig: Take 1 tablet (40 mg total) by mouth once daily.          Dispense:  90 tablet          Refill:  3

## 2017-07-24 ENCOUNTER — OFFICE VISIT (OUTPATIENT)
Dept: FAMILY MEDICINE | Facility: CLINIC | Age: 52
End: 2017-07-24
Payer: COMMERCIAL

## 2017-07-24 VITALS
BODY MASS INDEX: 38.36 KG/M2 | SYSTOLIC BLOOD PRESSURE: 161 MMHG | TEMPERATURE: 98 F | WEIGHT: 315 LBS | HEIGHT: 76 IN | DIASTOLIC BLOOD PRESSURE: 99 MMHG | HEART RATE: 60 BPM

## 2017-07-24 DIAGNOSIS — M54.50 ACUTE LEFT-SIDED LOW BACK PAIN WITHOUT SCIATICA: Primary | ICD-10-CM

## 2017-07-24 DIAGNOSIS — R10.30 LOWER ABDOMINAL PAIN: ICD-10-CM

## 2017-07-24 DIAGNOSIS — N18.30 HYPERTENSIVE KIDNEY DISEASE WITH CKD STAGE III: ICD-10-CM

## 2017-07-24 DIAGNOSIS — R93.41 ABNORMAL CT SCAN, BLADDER: ICD-10-CM

## 2017-07-24 DIAGNOSIS — I12.9 HYPERTENSIVE KIDNEY DISEASE WITH CKD STAGE III: ICD-10-CM

## 2017-07-24 DIAGNOSIS — I10 ESSENTIAL HYPERTENSION: ICD-10-CM

## 2017-07-24 LAB
BACTERIA #/AREA URNS HPF: ABNORMAL /HPF
BILIRUB UR QL STRIP: NEGATIVE
CLARITY UR: CLEAR
COLOR UR: YELLOW
GLUCOSE UR QL STRIP: NEGATIVE
HGB UR QL STRIP: ABNORMAL
HYALINE CASTS #/AREA URNS LPF: 2 /LPF
KETONES UR QL STRIP: NEGATIVE
LEUKOCYTE ESTERASE UR QL STRIP: NEGATIVE
MICROSCOPIC COMMENT: ABNORMAL
NITRITE UR QL STRIP: NEGATIVE
PH UR STRIP: 6 [PH] (ref 5–8)
PROT UR QL STRIP: ABNORMAL
RBC #/AREA URNS HPF: 1 /HPF (ref 0–4)
SP GR UR STRIP: 1.02 (ref 1–1.03)
SQUAMOUS #/AREA URNS HPF: ABNORMAL /HPF
URN SPEC COLLECT METH UR: ABNORMAL
WBC #/AREA URNS HPF: 1 /HPF (ref 0–5)

## 2017-07-24 PROCEDURE — 90471 IMMUNIZATION ADMIN: CPT | Mod: S$GLB,,, | Performed by: FAMILY MEDICINE

## 2017-07-24 PROCEDURE — 87086 URINE CULTURE/COLONY COUNT: CPT

## 2017-07-24 PROCEDURE — 99214 OFFICE O/P EST MOD 30 MIN: CPT | Mod: 25,S$GLB,, | Performed by: FAMILY MEDICINE

## 2017-07-24 PROCEDURE — 81000 URINALYSIS NONAUTO W/SCOPE: CPT | Mod: PO

## 2017-07-24 PROCEDURE — 99999 PR PBB SHADOW E&M-EST. PATIENT-LVL V: CPT | Mod: PBBFAC,,, | Performed by: FAMILY MEDICINE

## 2017-07-24 PROCEDURE — 90732 PPSV23 VACC 2 YRS+ SUBQ/IM: CPT | Mod: S$GLB,,, | Performed by: FAMILY MEDICINE

## 2017-07-24 RX ORDER — HYDRALAZINE HYDROCHLORIDE 25 MG/1
25 TABLET, FILM COATED ORAL 3 TIMES DAILY
Qty: 90 TABLET | Refills: 11 | Status: SHIPPED | OUTPATIENT
Start: 2017-07-24 | End: 2018-03-04 | Stop reason: SDUPTHER

## 2017-07-24 RX ORDER — TRAMADOL HYDROCHLORIDE 50 MG/1
TABLET ORAL
Refills: 0 | COMMUNITY
Start: 2017-07-19 | End: 2017-07-24

## 2017-07-24 RX ORDER — TAMSULOSIN HYDROCHLORIDE 0.4 MG/1
0.4 CAPSULE ORAL
COMMUNITY
Start: 2017-07-19 | End: 2018-09-11

## 2017-07-24 RX ORDER — CARVEDILOL 25 MG/1
25 TABLET ORAL
COMMUNITY
Start: 2017-05-31 | End: 2017-07-30

## 2017-07-24 RX ORDER — NIFEDIPINE 90 MG/1
90 TABLET, EXTENDED RELEASE ORAL
COMMUNITY
Start: 2017-07-19 | End: 2019-08-02

## 2017-07-24 RX ORDER — HYDROCODONE BITARTRATE AND ACETAMINOPHEN 7.5; 325 MG/1; MG/1
1 TABLET ORAL EVERY 6 HOURS PRN
Qty: 40 TABLET | Refills: 0 | Status: SHIPPED | OUTPATIENT
Start: 2017-07-24 | End: 2017-08-03

## 2017-07-24 NOTE — PROGRESS NOTES
Subjective:      Patient ID: Leodan Dan is a 51 y.o. male.    Chief Complaint: Low-back Pain and Hip Pain    HPIhe was seen in the ER for left flank pain recentlya nd he states that the tramdol is not helping.  He is not seeing blood in his urine but he had this in the urine when he was in the ER on the sample.  He has not had fever noted.  No dysuria noted.  He was given flomax also when he was in the Er.  He was on this med in the past but he had run out of the RX.  Dr. Lackey is his doc.      ED Provider Notes - Arias Boyer MD - 07/19/2017 1:25 AM CDT  Formatting of this note may be different from the original.    Triage Note Reviewed    History     Chief Complaint   Patient presents with    Nephrolithiasis     HPI Comments: 51-year-old male with a history of kidney stones comes complaining of left flank pain which began two days ago. No fever. No hematuria. No dysuria. She has not taken any medications for his discomfort, which he describes as dull and achy. Pain does not radiate. No nausea or vomiting, no fever. Normal bowel movements. He states the pain feels similar to previous episodes of kidney stones.    Patient is a 51 y.o. male presenting with male genitourinary complaint. The history is provided by the patient.   Male  Problem   Presenting symptoms: no dysuria, no penile discharge, no scrotal pain and no swelling   Context: spontaneously   Relieved by: Nothing  Worsened by: Nothing  Ineffective treatments: None tried  Associated symptoms: flank pain   Associated symptoms: no abdominal pain, no diarrhea, no fever, no genital lesions, no hematuria, no nausea, no scrotal swelling, no urinary frequency, no urinary hesitation, no urinary incontinence, no urinary retention and no vomiting   Risk factors: kidney stones   Risk factors: no bladder surgery, no recent infection and no sickle cell disease     Review of Systems   Constitutional: Negative for chills, fatigue, fever and unexpected weight  change.   HENT: Negative for congestion, ear pain, rhinorrhea, sore throat and trouble swallowing.   Eyes: Negative for photophobia and visual disturbance.   Respiratory: Negative for cough, choking, chest tightness, shortness of breath and wheezing.   Cardiovascular: Negative for chest pain and palpitations.   Gastrointestinal: Negative for abdominal distention, abdominal pain, blood in stool, constipation, diarrhea, nausea and vomiting.   Genitourinary: Positive for flank pain. Negative for bladder incontinence, decreased urine volume, difficulty urinating, discharge, dysuria, frequency, hematuria, hesitancy, scrotal swelling, testicular pain and urgency.   Musculoskeletal: Negative for arthralgias, back pain, joint swelling, myalgias, neck pain and neck stiffness.   Skin: Negative for color change, pallor and rash.   Neurological: Negative for dizziness, seizures, syncope, weakness, light-headedness, numbness and headaches.   Psychiatric/Behavioral: Negative for agitation and confusion.         Past Medical History:   Diagnosis Date    Anemia    Arthritis    Chronic kidney disease   decreased function    Encounter for blood transfusion    GERD (gastroesophageal reflux disease)    Hormone disorder    Hx MRSA infection 10/2013   wound/ ankle    Hypertension    Kidney stone   passed on his own, 2 total, last time 2013    Multiple myeloma    Myasthenia gravis    Sleep apnea   cpap at night    Urinary tract infection   last time 3 years ago     Past Surgical History:   Procedure Laterality Date    Cystoscopy    Knee arthroscopy   x2 left    Left spermatocelectomy Left 2012    Skin graft 10/31/2013   R lower leg- AVNI    Skin graft split thickness leg / foot 7/10/2014   right    Thymectomy   for MG    Total knee arthroplasty Left 05/23/2016`   Dr Mccollum     Family History   Problem Relation Age of Onset    Hypertension Mother    Diabetes Mother    Cancer Father   lung    Prostate cancer  "Paternal Uncle 66    from metastatic CAP     Social History   Substance Use Topics    Smoking status: Never Smoker    Smokeless tobacco: Never Used    Alcohol use 0.0 oz/week   0 Standard drinks or equivalent per week   Comment: one beer a month     Physical Exam     Visit Vitals    BP (!) 171/92 (BP Location: Right arm, Patient Position: Lying)    Pulse 57    Temp 97.8 °F (36.6 °C) (Oral)    Resp 20    Ht 6' 4" (1.93 m)    Wt (!) 465 lb (210.9 kg)    SpO2 99%    BMI 56.6 kg/m2     Physical Exam   Constitutional: He is oriented to person, place, and time. He appears well-developed and well-nourished. No distress.   HENT:   Head: Normocephalic and atraumatic.   Nose: Nose normal.   Mouth/Throat: Oropharynx is clear and moist. No oropharyngeal exudate.   Eyes: Conjunctivae and EOM are normal. Pupils are equal, round, and reactive to light. No scleral icterus.   Neck: Normal range of motion. Neck supple. No JVD present. No tracheal deviation present. No thyromegaly present.   Cardiovascular: Normal rate, regular rhythm, normal heart sounds and intact distal pulses. Exam reveals no gallop and no friction rub.   No murmur heard.  Pulmonary/Chest: Effort normal and breath sounds normal. No stridor. No respiratory distress. He has no wheezes. He has no rales. He exhibits no tenderness.   Abdominal: Soft. Bowel sounds are normal. He exhibits no distension and no mass. There is no tenderness. There is no rebound and no guarding.   No abdominal tenderness   Musculoskeletal: Normal range of motion. He exhibits no edema or tenderness.   No CVA tenderness   Lymphadenopathy:   He has no cervical adenopathy.   Neurological: He is alert and oriented to person, place, and time. He has normal reflexes. He displays normal reflexes. No cranial nerve deficit. He exhibits normal muscle tone. Coordination normal.   Skin: Skin is warm and dry. No rash noted. He is not diaphoretic.   Psychiatric: He has a normal mood and " affect.   Nursing note and vitals reviewed.    ED Course     Labs Reviewed   URINALYSIS, COMPLETE - Abnormal; Notable for the following:   Result Value   Blood, Urine SMALL (*)   Protein, Urine 300 (*)   Casts, Hyaline 3 (*)   All other components within normal limits   CBC WITH DIFFERENTIAL - Abnormal; Notable for the following:   RBC 3.46 (*)   HGB 10.6 (*)   HCT 31.2 (*)   RDW 14.7 (*)   All other components within normal limits   COMPREHENSIVE METABOLIC PANEL - Abnormal; Notable for the following:   Glucose 108 (*)   BUN 33 (*)   Creatinine 2.70 (*)   Albumin 3.4 (*)   AST 43 (*)   All other components within normal limits   GLOMERULAR FILTRATION RATE - Abnormal; Notable for the following:   GFR Non  25 (*)   GFR  30 (*)   All other components within normal limits     Lab Results for last 36Hrs:  Recent Results (from the past 36 hour(s))   UA Complete, Collection method: Cath   Collection Time: 07/18/17 7:43 PM   Result Value Ref Range   Urine type CCMS   Color, Urine YELLOW   Appearance CLEAR   Glucose, Urine NEGATIVE NEGATIVE mg/dL   Bilirubin, Urine NEGATIVE NEGATIVE mg/dL   Ketones, Urine NEGATIVE NEGATIVE mg/dL   Specific Gravity, Urine 1.014 1.005 - 1.030   Blood, Urine SMALL (A) NEGATIVE   pH, Urine 5.5 4.5 - 8.0   Protein, Urine 300 (A) NEGATIVE mg/dL   Urobilinogen 0.2 0.2 - 1.0 [Mitchell'U]/dL   Nitrite, Urine NEGATIVE NEGATIVE   Leuk. Esterase, Urine NEGATIVE NEGATIVE   RBC, Urine 2 0 - 4   WBC, Urine 1 0 - 5   Epith. Cells 1 0 - 2   Casts, Hyaline 3 (H) 0 - 2   Bacteria, Urine 0-5 0 - 5   CBC with Differential   Collection Time: 07/18/17 11:28 PM   Result Value Ref Range   WBC 8.8 4.8 - 10.8 10*3/uL   RBC 3.46 (L) 4.70 - 6.10 10*6/uL   HGB 10.6 (L) 14.0 - 18.0 g/dL   HCT 31.2 (L) 42.0 - 52.0 %   MCV 90.1 80.0 - 94.0 fL   MCH 30.5 27.0 - 31.0 pg   MCHC 33.9 33.0 - 37.0 g/dL   RDW 14.7 (H) 11.5 - 14.5 %   Platelet Count 255 130 - 400 10*3/uL   MPV 8.1 7.4 - 10.4 fL    Neutrophils Percent 52.0 36.0 - 66.0 %   Lymphocytes Percent 31.0 21.0 - 50.0 %   Monocytes Percent 10.0 2.0 - 10.0 %   Eosinophils Percent 7.0 0.0 - 10.0 %   Basophils Percent 1.0 0.0 - 1.0 %   Neutrophils Absolute 4.5 1.4 - 6.5 10*3/uL   Lymphocytes Absolute 2.7 1.2 - 3.4 10*3/uL   Monocytes Absolute 0.9 0.1 - 1.0 10*3/uL   Eosinophils Absolute 0.6 0.0 - 0.7 10*3/uL   Basophils Absolute 0.1 0.0 - 0.2 10*3/uL   Comprehensive metabolic panel   Collection Time: 07/18/17 11:28 PM   Result Value Ref Range   Glucose 108 (H) 65 - 99 mg/dL   Sodium 138 136 - 144 mmol/L   Potassium 3.7 3.6 - 5.1 mmol/L   Chloride 105 101 - 111 mmol/L   CO2 24 22 - 32 mmol/L   BUN 33 (H) 8 - 20 mg/dL   Calcium 9.3 8.9 - 10.3 mg/dL   Creatinine 2.70 (H) 0.90 - 1.30 mg/dL   Albumin 3.4 (L) 3.5 - 4.8 g/dL   Total Bilirubin 0.6 0.4 - 2.0 mg/dL   ALKP 52 28 - 116 U/L   Total Protein 6.6 6.1 - 7.9 g/dL   ALT 26 5 - 56 U/L   AST 43 (H) 12 - 40 U/L   Anion Gap 9 7 - 16 mmol/L   Glomerular Filtration Rate   Collection Time: 07/18/17 11:28 PM   Result Value Ref Range   GFR Non  25 (A) >59 mL/min   GFR  30 (A) >59 mL/min     Diagnostic Results for last 36Hrs:  Ct Abdomen Pelvis Wo Contrast Stone    Result Date: 7/18/2017  REASON FOR EXAM: Left flank pain, history of kidney stones TECHNICAL FACTORS: Multiple contiguous axial CT images were obtained of the abdomen and pelvis without intravenous administration of contrast. 2D reformatted images were performed. Automated exposure control was utilized for radiation dose reduction. COMPARISON: December 9, 2011 ABDOMEN FINDINGS: The liver, spleen, bilateral adrenal glands, gallbladder, pancreas are unremarkable. The gastrointestinal tract is nonopacified. There is no inflammation or obstruction of the small or large bowel. The appendix is normal. A rounded exophytic mass is again identified along lateral border of the right kidney, this is unchanged when compared to 2011,  and likely represents a hyperdense cyst. There are adjacent calcifications present. There is no hydronephrosis or urolithiasis present. There is a fat-containing umbilical hernia again identified. There is no free air or free fluid within the abdomen or pelvis. There is mild thickening of the urinary bladder, concerning for cystitis. The prostate gland is unremarkable. IMPRESSION: 1. No urolithiasis or hydronephrosis. 2. Thickening of the bladder wall, concerning for cystitis. Electronically signed by Nic Salgado MD on 7/18/2017 9:59 PM     Wet Read Results  CT Abdomen Pelvis WO Contrast Stone   Final Result       Procedures    ED Course     MDM  Number of Diagnoses or Management Options  Acute cystitis without hematuria:   Left flank pain:   Diagnosis management comments: Differential this patient includes pyelonephritis, ureterolithiasis, cystitis, colitis, strain, etc. Patient has a history of kidney stones and states the pain he feels now is similar to the pain he felt when he had a stone. Denies any abnormal bowel movements. Denies fevers. The pulses showed small blood, two red cells, no bacteria, no nitrites, no leukocyte esterase, one white cell. No evidence of infection but suggestive of possible ureteral calculus. Patient decided to undergo CT, stone protocol, but this study, personally reviewed by me, did not show any evidence of ureteral calculus. The urinary bladder was somewhat thickened, suggestive of cystitis. The patient will be continued on Flomax, and he'll be prescribed tramadol for discomfort. He has seen Dr. Lackey in the past, and will make an appointment to see him in the near future. He'll return here if worse in any way. Meanwhile, the patient states that he is feeling much better than earlier.      Amount and/or Complexity of Data Reviewed  Clinical lab tests: ordered and reviewed  Tests in the radiology section of CPT®: ordered and reviewed  Decide to obtain previous medical records or  to obtain history from someone other than the patient: yes  Review and summarize past medical records: yes  Independent visualization of images, tracings, or specimens: yes    Risk of Complications, Morbidity, and/or Mortality  Presenting problems: moderate  Diagnostic procedures: low  Management options: low    Patient Progress  Patient progress: improved    ED Critical Care Time    Diagnosis: Left flank pain, cystitis      Arias Boyer MD  07/19/17 0137      Arias Boyer MD  07/19/17 0141      Back to top of Miscellaneous Notes  ED Triage Notes - Samuel Infante, RN - 07/18/2017 7:21 PM CDT  Pt reassessed, VS updated, no change in assessment. Informed of ER saturation and will be seen on acuity but instructed to notify of any changes or concerns while waiting in lobby.    Back to top of Miscellaneous Notes  ED Notes - Rafia Romero LPN - 07/18/2017 5:31 PM CDT  Patient ambulatory back to ED waiting room after being roomed by mistake.  NADN, patient aware of situation and verb understanding.    Back to top of Miscellaneous Notes  ED Triage Notes - Radha Peters RN - 07/18/2017 4:52 PM CDT  Pt states he has L flank pain getting progressively worse since Sunday, + hx kidney stones    Back to top of Miscellaneous Notes      Plan of Treatment  - as of this encounter    Upcoming Encounters  Upcoming Encounters   Date Type Specialty Care Team Description   08/02/2017 Office Visit Urology Conrado Lackey MD    [id number]    [address]    [place] [address] [phone] (Fax)       08/21/2017 Office Visit Cardiology Greg Anderson MD    [id number] [facility]    [place] [address] [phone]    [phone] (Fax)       10/12/2017 Office Visit Pulmonology Slade Gunn MD    [id number]    [address]    [place] [address] [phone]    [phone] (Fax)         Lab Results  - in this encounter    Table of Contents for Lab Results  Glomerular Filtration Rate  (07/18/2017 11:28 PM)  Comprehensive metabolic panel (07/18/2017 11:28 PM)  CBC with Differential (07/18/2017 11:28 PM)  UA Complete, Collection method: Cath (07/18/2017 7:43 PM)       Glomerular Filtration Rate (07/18/2017 11:28 PM)  Glomerular Filtration Rate (07/18/2017 11:28 PM)   Component Value Ref Range   GFR Non  25 (A) >59 mL/min   GFR  30 (A)  Comment:                STAGES OF CHRONIC KIDNEY DISEASE  STAGE          DESCRIPTION           GFR(mL/min/1.73 m2)  3         Moderate decrease GFR            30-59  4           Severe decrease GFR            15-29  5              Kidney Failure         <15 (or dialysis)  Chronic kidney disease is defined as either kidney damage  or GFR <60mL/min/1.73 m2 for >=3 months. Kidney damage is  defined as pathologic abnormalities or markers of damage,  including abnormalities in blood or urine tests or imaging  studies.   >59 mL/min     Glomerular Filtration Rate (07/18/2017 11:28 PM)   Specimen Performing Laboratory   N/A [facility]     Back to top of Lab Results      Comprehensive metabolic panel (07/18/2017 11:28 PM)  Comprehensive metabolic panel (07/18/2017 11:28 PM)   Component Value Ref Range   Glucose 108 (H) 65 - 99 mg/dL   Sodium 138 136 - 144 mmol/L   Potassium 3.7 3.6 - 5.1 mmol/L   Chloride 105 101 - 111 mmol/L   CO2 24 22 - 32 mmol/L   BUN 33 (H) 8 - 20 mg/dL   Calcium 9.3 8.9 - 10.3 mg/dL   Creatinine 2.70 (H) 0.90 - 1.30 mg/dL   Albumin 3.4 (L) 3.5 - 4.8 g/dL   Total Bilirubin 0.6 0.4 - 2.0 mg/dL   ALKP 52 28 - 116 U/L   Total Protein 6.6 6.1 - 7.9 g/dL   ALT 26 5 - 56 U/L   AST 43 (H) 12 - 40 U/L   Anion Gap 9 7 - 16 mmol/L     Comprehensive metabolic panel (07/18/2017 11:28 PM)   Specimen Performing Laboratory   N/A - Blood [facility]     Back to top of Lab Results      CBC with Differential (07/18/2017 11:28 PM)  CBC with Differential (07/18/2017 11:28 PM)   Component Value Ref Range   WBC 8.8 4.8 - 10.8 10*3/uL   RBC 3.46  (L) 4.70 - 6.10 10*6/uL   HGB 10.6 (L) 14.0 - 18.0 g/dL   HCT 31.2 (L) 42.0 - 52.0 %   MCV 90.1 80.0 - 94.0 fL   MCH 30.5 27.0 - 31.0 pg   MCHC 33.9 33.0 - 37.0 g/dL   RDW 14.7 (H) 11.5 - 14.5 %   Platelet Count 255 130 - 400 10*3/uL   MPV 8.1 7.4 - 10.4 fL   Neutrophils Percent 52.0 36.0 - 66.0 %   Lymphocytes Percent 31.0 21.0 - 50.0 %   Monocytes Percent 10.0 2.0 - 10.0 %   Eosinophils Percent 7.0 0.0 - 10.0 %   Basophils Percent 1.0 0.0 - 1.0 %   Neutrophils Absolute 4.5 1.4 - 6.5 10*3/uL   Lymphocytes Absolute 2.7 1.2 - 3.4 10*3/uL   Monocytes Absolute 0.9 0.1 - 1.0 10*3/uL   Eosinophils Absolute 0.6 0.0 - 0.7 10*3/uL   Basophils Absolute 0.1 0.0 - 0.2 10*3/uL     CBC with Differential (07/18/2017 11:28 PM)   Specimen Performing Laboratory   Blood [facility]     Back to top of Lab Results      UA Complete, Collection method: Cath (07/18/2017 7:43 PM)  UA Complete, Collection method: Cath (07/18/2017 7:43 PM)   Component Value Ref Range   Urine type CCMS     Color, Urine YELLOW     Appearance CLEAR     Glucose, Urine NEGATIVE NEGATIVE mg/dL   Bilirubin, Urine NEGATIVE NEGATIVE mg/dL   Ketones, Urine NEGATIVE NEGATIVE mg/dL   Specific Gravity, Urine 1.014 1.005 - 1.030   Blood, Urine SMALL (A) NEGATIVE   pH, Urine 5.5 4.5 - 8.0   Protein, Urine 300 (A) NEGATIVE mg/dL   Urobilinogen 0.2 0.2 - 1.0 [Mitchell'U]/dL   Nitrite, Urine NEGATIVE NEGATIVE   Leuk. Esterase, Urine NEGATIVE NEGATIVE   RBC, Urine 2 0 - 4   WBC, Urine 1 0 - 5   Epith. Cells 1 0 - 2   Casts, Hyaline 3 (H) 0 - 2   Bacteria, Urine 0-5 0 - 5     UA Complete, Collection method: Cath (07/18/2017 7:43 PM)   Specimen Performing Laboratory   Urine CC [facility]     Back to top of Lab Results      Imaging Results  - in this encounter      CT Abdomen Pelvis WO Contrast Stone (07/18/2017 9:44 PM)  CT Abdomen Pelvis WO Contrast Stone (07/18/2017 9:44 PM)   Specimen Performing Laboratory     [facility] RADIOLOGY       CT Abdomen Pelvis WO Contrast Stone  (07/18/2017 9:44 PM)   Narrative   REASON FOR EXAM: Left flank pain, history of kidney stones         TECHNICAL FACTORS: Multiple contiguous axial CT images were obtained of the abdomen and pelvis without intravenous administration of contrast. 2D reformatted images were performed. Automated exposure control was utilized for radiation dose reduction.         COMPARISON: December 9, 2011        ABDOMEN FINDINGS: The liver, spleen, bilateral adrenal glands, gallbladder, pancreas are unremarkable.        The gastrointestinal tract is nonopacified. There is no inflammation or obstruction of the small or large bowel. The appendix is normal.        A rounded exophytic mass is again identified along lateral border of the right kidney, this is unchanged when compared to 2011, and likely represents a hyperdense cyst. There are adjacent calcifications present. There is no hydronephrosis or urolithiasis     present.        There is a fat-containing umbilical hernia again identified.        There is no free air or free fluid within the abdomen or pelvis. There is mild thickening of the urinary bladder, concerning for cystitis. The prostate gland is unremarkable.         IMPRESSION:    1.  No urolithiasis or hydronephrosis.    2. Thickening of the bladder wall, concerning for cystitis.                       Electronically signed by Nic Salgado MD on 7/18/2017 9:59 PM               CT Abdomen Pelvis WO Contrast Stone (07/18/2017 9:44 PM)   Procedure Note   Interface, Rad Results In - 07/18/2017 10:03 PM CDT    REASON FOR EXAM: Left flank pain, history of kidney stones     TECHNICAL FACTORS: Multiple contiguous axial CT images were obtained of the abdomen and pelvis without intravenous administration of contrast. 2D reformatted images were performed. Automated exposure control was utilized for radiation dose reduction.     COMPARISON: December 9, 2011    ABDOMEN FINDINGS: The liver, spleen, bilateral adrenal glands,  gallbladder, pancreas are unremarkable.    The gastrointestinal tract is nonopacified. There is no inflammation or obstruction of the small or large bowel. The appendix is normal.    A rounded exophytic mass is again identified along lateral border of the right kidney, this is unchanged when compared to 2011, and likely represents a hyperdense cyst. There are adjacent calcifications present. There is no hydronephrosis or urolithiasis present.    There is a fat-containing umbilical hernia again identified.    There is no free air or free fluid within the abdomen or pelvis. There is mild thickening of the urinary bladder, concerning for cystitis. The prostate gland is unremarkable.     IMPRESSION:  1. No urolithiasis or hydronephrosis.  2. Thickening of the bladder wall, concerning for cystitis.         Electronically signed by Nic Salgado MD on 7/18/2017 9:59 PM             Visit Diagnoses  - in this encounter    Diagnosis   Left flank pain - Primary   Abdominal pain, unspecified site     Acute cystitis without hematuria     Administered Medications  - in this encounter    Inactive Administered Medications - up to 3 most recent administrations  Inactive Administered Medications - up to 3 most recent administrations   Medication Order MAR Action Action Date Dose Rate Site   acetaminophen (OFIRMEV) bottle 1,000 mg    1,000 mg, Intravenous, Once, 1 dose, Tue 7/18/17 at 2130           $New Bag 7/18/2017 21:59 CDT 1,000 mg             tamsulosin (FLOMAX) 24 hr capsule 0.4 mg    0.4 mg, Oral, Once, 1 dose, Tue 7/18/17 at 1945           $Given 7/18/2017 19:47 CDT 0.4 mg         I reviewed his GFR of 30 with him and he has been going to Dr. Lyman for this and has an appt again soon for this.  He saw him last week for this issue and is going to f/u soon for this.  Twisting to the left makes him hurt more.  Bending over makes it hurt in the low back.      Health Maintenance Due   Topic Date Due    Pneumococcal PCV13 (High  Risk) (1 - PCV13 Required) 11/28/1964    Pneumococcal PPSV23 (High Risk) (1) 11/28/1981       Past Medical History:  Past Medical History:   Diagnosis Date    Acquired diverticulosis of colon     Allergy     Anemia     iron deficient,chronic, with paraprotenemia    Anticoagulant long-term use     Dyspnea on exertion     ED (erectile dysfunction) 05/15/12    Elevated liver enzymes     GERD (gastroesophageal reflux disease)     Hepatitis B core antibody positive 2012    Hyperglycemia     Hypertension     Hypogonadism male     Iron deficiency     Left ventricular diastolic dysfunction with preserved systolic function     Multiple myeloma, stage 1 2012    Myasthenia gravis associated with thymoma     last exacerbation around early 2011, experienced weakness    Obesity     Prostatism 05/15/12    Renal insufficiency     sees Dr. Fernandez for this.     Sleep apnea     He uses a CPAP for this. Room air    Spermatocele of epididymis, single 05/15/12    left sided cyst, had surgery    Ulcer 2011    peptic ulcer    Varicose veins     sometimes wears compression stockings     Past Surgical History:   Procedure Laterality Date    COLONOSCOPY  9/17/2012    was normal except for diverticulosis by Dr. Murdock     CYSTOSCOPY W/ DECANNULATION      ESOPHAGOGASTRODUODENOSCOPY  2011    He had ulcers.    JOINT REPLACEMENT      knee    KNEE SURGERY      left    SPERMATOCELECTOMY  05/15/12    Left side    THYMECTOMY  2003    for myasthenia gravis     Review of patient's allergies indicates:   Allergen Reactions    Bystolic [nebivolol] Other (See Comments)     Severe hypotension    Lisinopril      Other reaction(s): chronic cough    Lortab [hydrocodone-acetaminophen] Other (See Comments)     Weak  & dizziness     Current Outpatient Prescriptions on File Prior to Visit   Medication Sig Dispense Refill    ascorbic acid (VITAMIN C) 1000 MG tablet Take 1,000 mg by mouth once daily.      aspirin 325 MG tablet Take  325 mg by mouth 2 (two) times daily.       atorvastatin (LIPITOR) 40 MG tablet Take 1 tablet (40 mg total) by mouth once daily. 90 tablet 3    ferrous sulfate 325 (65 FE) MG EC tablet Take 325 mg by mouth once daily. Not taking      furosemide (LASIX) 20 MG tablet Take 20 mg by mouth once daily.      losartan (COZAAR) 100 MG tablet Take 100 mg by mouth once daily.      multivitamin (THERAGRAN) tablet Take 1 tablet by mouth.      pyridostigmine (MESTINON) 60 mg Tab Take 2 tablets 3 times daily 180 tablet 2    UNKNOWN TO PATIENT Blood pressure med and recently increased from 60 mg to 90 mg a day      vitamin D 1000 units Tab Take 1,000 Units by mouth once daily. 2 tablets once daily in the morning with food       [DISCONTINUED] fluticasone (FLONASE) 50 mcg/actuation nasal spray 1 spray by Each Nare route once daily.      [DISCONTINUED] amlodipine (NORVASC) 10 MG tablet Take 1 tablet (10 mg total) by mouth once daily. 90 tablet 3     No current facility-administered medications on file prior to visit.      Social History     Social History    Marital status:      Spouse name: N/A    Number of children: N/A    Years of education: N/A     Occupational History    Not on file.     Social History Main Topics    Smoking status: Never Smoker    Smokeless tobacco: Never Used    Alcohol use No    Drug use: No    Sexual activity: Not on file     Other Topics Concern    Not on file     Social History Narrative    No narrative on file     Family History   Problem Relation Age of Onset    Diabetes Mother     Hypertension Mother     Heart failure Mother     Diabetes Mellitus Mother     Prostate cancer Father     Hypertension Father     Lung cancer Father     Diabetes Sister     Hypertension Sister     Diabetes Mellitus Sister     Prostate cancer Paternal Uncle     Hypertension Brother     Stroke Neg Hx     Heart attack Neg Hx                Review of Systems   Constitutional: Negative for  "fatigue and fever.   Respiratory: Negative for cough, shortness of breath and wheezing.    Cardiovascular: Negative for chest pain and palpitations.   Gastrointestinal: Negative for abdominal pain.   Genitourinary: Negative for hematuria.       Objective:   BP (!) 161/99   Pulse 60   Temp 98.4 °F (36.9 °C) (Oral)   Ht 6' 4" (1.93 m)   Wt (!) 212.2 kg (467 lb 12.8 oz)   BMI 56.94 kg/m²     Physical Exam   Constitutional: He appears well-developed and well-nourished.   Cardiovascular: Normal rate, regular rhythm and normal heart sounds.  Exam reveals no gallop and no friction rub.    No murmur heard.  Pulmonary/Chest: Effort normal and breath sounds normal. No respiratory distress. He has no wheezes. He has no rales.   Musculoskeletal: He exhibits no edema.        Lumbar back: He exhibits tenderness, pain and spasm. He exhibits normal range of motion, no swelling, no edema and no deformity.   Neurological: He is alert. He has normal strength. He displays no atrophy and no tremor. No sensory deficit. He exhibits normal muscle tone. Coordination and gait normal.   The patient has a negative straight leg raise bilaterally.         Assessment:     1. Acute left-sided low back pain without sciatica    2. Lower abdominal pain    3. Hypertensive kidney disease with CKD stage III    4. Abnormal CT scan, bladder    5. Essential hypertension        Plan:   Leodan was seen today for low-back pain and hip pain.    Diagnoses and all orders for this visit:    Acute left-sided low back pain without sciatica  -     Urinalysis; Future  -     Urine culture; Future  -     Urinalysis  -     Urine culture    Lower abdominal pain  -     Urinalysis; Future  -     Urine culture; Future  -     Urinalysis  -     Urine culture    Hypertensive kidney disease with CKD stage III  -     Ambulatory referral to Nephrology    Abnormal CT scan, bladder  -     Urinalysis; Future  -     Urine culture; Future  -     Urinalysis  -     Urine " culture    Essential hypertension  -     hydrALAZINE (APRESOLINE) 25 MG tablet; Take 1 tablet (25 mg total) by mouth 3 (three) times daily.    Other orders  -     Urinalysis Microscopic  -     hydrocodone-acetaminophen 7.5-325mg (NORCO) 7.5-325 mg per tablet; Take 1 tablet by mouth every 6 (six) hours as needed for Pain.      He wanted a second opinion on the kidney issue from Dr. Bruner so I placed a referral for that.    See Dr. Lackey soon for the flank pain and abnormal CT of the bladder.

## 2017-07-26 ENCOUNTER — TELEPHONE (OUTPATIENT)
Dept: FAMILY MEDICINE | Facility: CLINIC | Age: 52
End: 2017-07-26

## 2017-07-26 DIAGNOSIS — R80.9 PROTEINURIA, UNSPECIFIED TYPE: Primary | ICD-10-CM

## 2017-07-26 LAB — BACTERIA UR CULT: NO GROWTH

## 2017-07-26 NOTE — TELEPHONE ENCOUNTER
----- Message from Juan Stevenson MD sent at 7/25/2017 11:41 PM CDT -----  Follow up with the nephrologist on the protein in the urine.

## 2017-07-27 ENCOUNTER — TELEPHONE (OUTPATIENT)
Dept: FAMILY MEDICINE | Facility: CLINIC | Age: 52
End: 2017-07-27

## 2017-07-27 NOTE — TELEPHONE ENCOUNTER
----- Message from Kathy Khanna sent at 7/27/2017 10:21 AM CDT -----  Contact: self [phone]  States that he is returning call please call back at [phone]//thank you acc

## 2017-07-31 ENCOUNTER — TELEPHONE (OUTPATIENT)
Dept: FAMILY MEDICINE | Facility: CLINIC | Age: 52
End: 2017-07-31

## 2017-07-31 NOTE — TELEPHONE ENCOUNTER
Pt advised Dr. Stevenson recommends he needs to be seen by a nephrologist due to protein in the urine. Pt states he has been referred to Dr. Bruner. Referral authorization pending.

## 2017-07-31 NOTE — TELEPHONE ENCOUNTER
----- Message from Bambi Garcia sent at 7/31/2017 10:47 AM CDT -----  returned call...[phone] (home)

## 2017-08-02 ENCOUNTER — TELEPHONE (OUTPATIENT)
Dept: FAMILY MEDICINE | Facility: CLINIC | Age: 52
End: 2017-08-02

## 2017-08-02 NOTE — TELEPHONE ENCOUNTER
----- Message from Ortiz Ryan sent at 8/2/2017 10:25 AM CDT -----  Contact: [phone]   Returning call,please call back at [phone].  Thx-[facility]

## 2017-08-15 ENCOUNTER — LAB VISIT (OUTPATIENT)
Dept: LAB | Facility: HOSPITAL | Age: 52
End: 2017-08-15
Attending: SPECIALIST
Payer: COMMERCIAL

## 2017-08-15 DIAGNOSIS — D64.9 ANEMIA, UNSPECIFIED: ICD-10-CM

## 2017-08-15 DIAGNOSIS — N25.81 SECONDARY HYPERPARATHYROIDISM OF RENAL ORIGIN: ICD-10-CM

## 2017-08-15 DIAGNOSIS — N28.1 ACQUIRED CYST OF KIDNEY: ICD-10-CM

## 2017-08-15 DIAGNOSIS — R80.9 PROTEINURIA: Primary | ICD-10-CM

## 2017-08-15 LAB
BACTERIA #/AREA URNS HPF: ABNORMAL /HPF
BASOPHILS # BLD AUTO: 0.03 K/UL
BASOPHILS NFR BLD: 0.3 %
BILIRUB UR QL STRIP: NEGATIVE
CLARITY UR: CLEAR
COLOR UR: YELLOW
DIFFERENTIAL METHOD: ABNORMAL
EOSINOPHIL # BLD AUTO: 0.5 K/UL
EOSINOPHIL NFR BLD: 6 %
ERYTHROCYTE [DISTWIDTH] IN BLOOD BY AUTOMATED COUNT: 13.7 %
GLUCOSE UR QL STRIP: NEGATIVE
GRAN CASTS #/AREA URNS LPF: 2 /LPF
HCT VFR BLD AUTO: 30.5 %
HGB BLD-MCNC: 10.1 G/DL
HGB UR QL STRIP: NEGATIVE
HYALINE CASTS #/AREA URNS LPF: 15 /LPF
KETONES UR QL STRIP: NEGATIVE
LEUKOCYTE ESTERASE UR QL STRIP: NEGATIVE
LYMPHOCYTES # BLD AUTO: 2.8 K/UL
LYMPHOCYTES NFR BLD: 32 %
MCH RBC QN AUTO: 30.3 PG
MCHC RBC AUTO-ENTMCNC: 33.1 G/DL
MCV RBC AUTO: 92 FL
MICROSCOPIC COMMENT: ABNORMAL
MONOCYTES # BLD AUTO: 0.8 K/UL
MONOCYTES NFR BLD: 8.6 %
NEUTROPHILS # BLD AUTO: 4.6 K/UL
NEUTROPHILS NFR BLD: 52.8 %
NITRITE UR QL STRIP: NEGATIVE
PH UR STRIP: 6 [PH] (ref 5–8)
PLATELET # BLD AUTO: 356 K/UL
PMV BLD AUTO: 10 FL
PROT UR QL STRIP: ABNORMAL
RBC # BLD AUTO: 3.33 M/UL
RBC #/AREA URNS HPF: 1 /HPF (ref 0–4)
SP GR UR STRIP: 1.02 (ref 1–1.03)
SQUAMOUS #/AREA URNS HPF: ABNORMAL /HPF
URN SPEC COLLECT METH UR: ABNORMAL
WBC # BLD AUTO: 8.72 K/UL
WBC #/AREA URNS HPF: 1 /HPF (ref 0–5)

## 2017-08-15 PROCEDURE — 81000 URINALYSIS NONAUTO W/SCOPE: CPT | Mod: PO

## 2017-08-15 PROCEDURE — 83970 ASSAY OF PARATHORMONE: CPT

## 2017-08-15 PROCEDURE — 85025 COMPLETE CBC W/AUTO DIFF WBC: CPT

## 2017-08-15 PROCEDURE — 82728 ASSAY OF FERRITIN: CPT

## 2017-08-15 PROCEDURE — 84466 ASSAY OF TRANSFERRIN: CPT

## 2017-08-15 PROCEDURE — 36415 COLL VENOUS BLD VENIPUNCTURE: CPT | Mod: PO

## 2017-08-15 PROCEDURE — 84156 ASSAY OF PROTEIN URINE: CPT

## 2017-08-15 PROCEDURE — 80069 RENAL FUNCTION PANEL: CPT

## 2017-08-15 PROCEDURE — 83540 ASSAY OF IRON: CPT

## 2017-08-16 ENCOUNTER — OFFICE VISIT (OUTPATIENT)
Dept: FAMILY MEDICINE | Facility: CLINIC | Age: 52
End: 2017-08-16
Payer: COMMERCIAL

## 2017-08-16 VITALS
HEART RATE: 69 BPM | WEIGHT: 315 LBS | HEIGHT: 76 IN | DIASTOLIC BLOOD PRESSURE: 77 MMHG | BODY MASS INDEX: 38.36 KG/M2 | SYSTOLIC BLOOD PRESSURE: 137 MMHG | TEMPERATURE: 99 F

## 2017-08-16 DIAGNOSIS — I12.9 CKD STAGE 4 SECONDARY TO HYPERTENSION: ICD-10-CM

## 2017-08-16 DIAGNOSIS — D50.9 IRON DEFICIENCY ANEMIA, UNSPECIFIED IRON DEFICIENCY ANEMIA TYPE: Primary | ICD-10-CM

## 2017-08-16 DIAGNOSIS — N18.4 CKD STAGE 4 SECONDARY TO HYPERTENSION: ICD-10-CM

## 2017-08-16 DIAGNOSIS — I10 ESSENTIAL HYPERTENSION: ICD-10-CM

## 2017-08-16 LAB
ALBUMIN SERPL BCP-MCNC: 3.6 G/DL
ANION GAP SERPL CALC-SCNC: 10 MMOL/L
BUN SERPL-MCNC: 36 MG/DL
CALCIUM SERPL-MCNC: 9.6 MG/DL
CHLORIDE SERPL-SCNC: 108 MMOL/L
CO2 SERPL-SCNC: 23 MMOL/L
CREAT SERPL-MCNC: 3.4 MG/DL
CREAT UR-MCNC: 269 MG/DL
EST. GFR  (AFRICAN AMERICAN): 22.5 ML/MIN/1.73 M^2
EST. GFR  (NON AFRICAN AMERICAN): 19.5 ML/MIN/1.73 M^2
FERRITIN SERPL-MCNC: 1068 NG/ML
GLUCOSE SERPL-MCNC: 89 MG/DL
IRON SERPL-MCNC: 40 UG/DL
PHOSPHATE SERPL-MCNC: 3.6 MG/DL
POTASSIUM SERPL-SCNC: 4.1 MMOL/L
PROT UR-MCNC: 810 MG/DL
PROT/CREAT RATIO, UR: 3.01
PTH-INTACT SERPL-MCNC: 241 PG/ML
SATURATED IRON: 14 %
SODIUM SERPL-SCNC: 141 MMOL/L
TOTAL IRON BINDING CAPACITY: 281 UG/DL
TRANSFERRIN SERPL-MCNC: 190 MG/DL

## 2017-08-16 PROCEDURE — 3075F SYST BP GE 130 - 139MM HG: CPT | Mod: S$GLB,,, | Performed by: FAMILY MEDICINE

## 2017-08-16 PROCEDURE — 99999 PR PBB SHADOW E&M-EST. PATIENT-LVL III: CPT | Mod: PBBFAC,,, | Performed by: FAMILY MEDICINE

## 2017-08-16 PROCEDURE — 3008F BODY MASS INDEX DOCD: CPT | Mod: S$GLB,,, | Performed by: FAMILY MEDICINE

## 2017-08-16 PROCEDURE — 3078F DIAST BP <80 MM HG: CPT | Mod: S$GLB,,, | Performed by: FAMILY MEDICINE

## 2017-08-16 PROCEDURE — 99214 OFFICE O/P EST MOD 30 MIN: CPT | Mod: S$GLB,,, | Performed by: FAMILY MEDICINE

## 2017-08-16 RX ORDER — PROMETHAZINE HYDROCHLORIDE 25 MG/1
25 TABLET ORAL EVERY 6 HOURS PRN
Qty: 6 TABLET | Refills: 0 | Status: SHIPPED | OUTPATIENT
Start: 2017-08-16 | End: 2018-04-11

## 2017-08-16 RX ORDER — CARVEDILOL 25 MG/1
25 TABLET ORAL
COMMUNITY
Start: 2017-08-14 | End: 2018-09-11 | Stop reason: SDUPTHER

## 2017-08-16 RX ORDER — POLYETHYLENE GLYCOL 3350, SODIUM SULFATE ANHYDROUS, SODIUM BICARBONATE, SODIUM CHLORIDE, POTASSIUM CHLORIDE 236; 22.74; 6.74; 5.86; 2.97 G/4L; G/4L; G/4L; G/4L; G/4L
POWDER, FOR SOLUTION ORAL
Qty: 4000 ML | Refills: 0 | Status: SHIPPED | OUTPATIENT
Start: 2017-08-16 | End: 2018-04-11

## 2017-08-16 NOTE — PROGRESS NOTES
Subjective:      Patient ID: Leodan Dan is a 51 y.o. male.    Chief Complaint: Follow-up and Back Pain    HPIhe states that the back pain is better and he has been being treated by DR. Carreno and it is helping.   He also has htn and his bp is better today on the new med doses.     We discussed his anemia.   Lab Results   Component Value Date    WBC 8.72 08/15/2017    HGB 10.1 (L) 08/15/2017    HCT 30.5 (L) 08/15/2017    MCV 92 08/15/2017     (H) 08/15/2017     Lab Results   Component Value Date    IRON 40 (L) 08/15/2017    TIBC 281 08/15/2017    FERRITIN 1,068 (H) 08/15/2017     He has had procrit in the past and he discussed this with Dr Golden.  We disucssed his iron deficiency and the need for a workup. He has not had melena or hematochezia.   No obvious bleeding.  He is on iron and he is still anemic and low on iron so there is concern.   He had a colonoscopy in 2012 and it was normal.     Health Maintenance Due   Topic Date Due    Influenza Vaccine  08/01/2017       Past Medical History:  Past Medical History:   Diagnosis Date    Acquired diverticulosis of colon     Allergy     Anemia     iron deficient,chronic, with paraprotenemia    Anticoagulant long-term use     Dyspnea on exertion     ED (erectile dysfunction) 05/15/12    Elevated liver enzymes     GERD (gastroesophageal reflux disease)     Hepatitis B core antibody positive 2012    Hyperglycemia     Hypertension     Hypogonadism male     Iron deficiency     Left ventricular diastolic dysfunction with preserved systolic function     Multiple myeloma, stage 1 2012    Myasthenia gravis associated with thymoma     last exacerbation around early 2011, experienced weakness    Obesity     Prostatism 05/15/12    Renal insufficiency     sees Dr. Fernandez for this.     Sciatic nerve injury     Sleep apnea     He uses a CPAP for this. Room air    Spermatocele of epididymis, single 05/15/12    left sided cyst, had surgery    Ulcer  2011    peptic ulcer    Varicose veins     sometimes wears compression stockings     Past Surgical History:   Procedure Laterality Date    COLONOSCOPY  9/17/2012    was normal except for diverticulosis by Dr. Murdock     CYSTOSCOPY W/ DECANNULATION      ESOPHAGOGASTRODUODENOSCOPY  2011    He had ulcers.    JOINT REPLACEMENT      knee    KNEE SURGERY      left    SPERMATOCELECTOMY  05/15/12    Left side    THYMECTOMY  2003    for myasthenia gravis     Review of patient's allergies indicates:   Allergen Reactions    Bystolic [nebivolol] Other (See Comments)     Severe hypotension    Lisinopril      Other reaction(s): chronic cough    Lortab [hydrocodone-acetaminophen] Other (See Comments)     Weak  & dizziness     Current Outpatient Prescriptions on File Prior to Visit   Medication Sig Dispense Refill    ascorbic acid (VITAMIN C) 1000 MG tablet Take 1,000 mg by mouth once daily.      aspirin 325 MG tablet Take 325 mg by mouth 2 (two) times daily.       atorvastatin (LIPITOR) 40 MG tablet Take 1 tablet (40 mg total) by mouth once daily. 90 tablet 3    ferrous sulfate 325 (65 FE) MG EC tablet Take 325 mg by mouth once daily. Not taking      furosemide (LASIX) 20 MG tablet Take 20 mg by mouth once daily.      hydrALAZINE (APRESOLINE) 25 MG tablet Take 1 tablet (25 mg total) by mouth 3 (three) times daily. (Patient taking differently: Take 50 mg by mouth every 12 (twelve) hours. ) 90 tablet 11    losartan (COZAAR) 100 MG tablet Take 100 mg by mouth once daily.      MEN'S MULTI-VITAMIN ORAL Take by mouth.      nifedipine (PROCARDIA-XL) 90 MG (OSM) 24 hr tablet Take 90 mg by mouth.      pyridostigmine (MESTINON) 60 mg Tab Take 2 tablets 3 times daily 180 tablet 2    tamsulosin (FLOMAX) 0.4 mg Cp24 Take 0.4 mg by mouth.      UNKNOWN TO PATIENT Blood pressure med and recently increased from 60 mg to 90 mg a day      vitamin D 1000 units Tab Take 1,000 Units by mouth once daily. 2 tablets once daily in the  "morning with food       [DISCONTINUED] multivitamin (THERAGRAN) tablet Take 1 tablet by mouth.       No current facility-administered medications on file prior to visit.      Social History     Social History    Marital status:      Spouse name: N/A    Number of children: N/A    Years of education: N/A     Occupational History    Not on file.     Social History Main Topics    Smoking status: Never Smoker    Smokeless tobacco: Never Used    Alcohol use No    Drug use: No    Sexual activity: Not on file     Other Topics Concern    Not on file     Social History Narrative    No narrative on file     Family History   Problem Relation Age of Onset    Diabetes Mother     Hypertension Mother     Heart failure Mother     Diabetes Mellitus Mother     Prostate cancer Father     Hypertension Father     Lung cancer Father     Diabetes Sister     Hypertension Sister     Diabetes Mellitus Sister     Prostate cancer Paternal Uncle     Hypertension Brother     Stroke Neg Hx     Heart attack Neg Hx              Review of Systems   Constitutional: Positive for activity change. Negative for chills, diaphoresis and fever.   HENT: Negative for congestion, hearing loss, mouth sores, postnasal drip and sore throat.    Eyes: Negative for pain and visual disturbance.   Respiratory: Negative for cough, chest tightness, shortness of breath and wheezing.    Cardiovascular: Negative for chest pain.   Gastrointestinal: Negative for abdominal pain, anal bleeding, blood in stool, constipation, diarrhea, nausea and vomiting.   Genitourinary: Negative for dysuria and hematuria.   Musculoskeletal: Negative for back pain, neck pain and neck stiffness.   Skin: Negative for rash.   Neurological: Negative for dizziness and weakness.       Objective:   /77   Pulse 69   Temp 98.7 °F (37.1 °C) (Oral)   Ht 6' 4" (1.93 m)   Wt (!) 208.4 kg (459 lb 8.8 oz)   BMI 55.94 kg/m²     Physical Exam   Constitutional: He is " oriented to person, place, and time. He appears well-developed and well-nourished.   HENT:   Head: Normocephalic and atraumatic.   Right Ear: External ear normal.   Left Ear: External ear normal.   Nose: Nose normal.   Mouth/Throat: Oropharynx is clear and moist. No oropharyngeal exudate.   Eyes: Conjunctivae and EOM are normal. Pupils are equal, round, and reactive to light. Right eye exhibits no discharge. Left eye exhibits no discharge. No scleral icterus.   Neck: Normal range of motion. Neck supple. No JVD present. No thyromegaly present.   Cardiovascular: Normal rate, regular rhythm, normal heart sounds and intact distal pulses.  Exam reveals no gallop and no friction rub.    No murmur heard.  Pulmonary/Chest: Effort normal and breath sounds normal. No respiratory distress. He has no wheezes. He has no rales. He exhibits no tenderness.   Abdominal: Soft. Bowel sounds are normal. He exhibits no distension and no mass. There is no tenderness. There is no rebound and no guarding.   Musculoskeletal: Normal range of motion. He exhibits no edema or tenderness.   Lymphadenopathy:     He has no cervical adenopathy.   Neurological: He is alert and oriented to person, place, and time. No cranial nerve deficit. Coordination normal.   Skin: Skin is warm and dry. He is not diaphoretic.   Psychiatric: He has a normal mood and affect.       Assessment:     1. Iron deficiency anemia, unspecified iron deficiency anemia type    2. Essential hypertension    3. CKD stage 4 secondary to hypertension        Plan:   Leodan was seen today for follow-up and back pain.    Diagnoses and all orders for this visit:    Iron deficiency anemia, unspecified iron deficiency anemia type  -     Case request GI: ESOPHAGOGASTRODUODENOSCOPY (EGD), COLONOSCOPY    Essential hypertension    CKD stage 4 secondary to hypertension    Other orders  -     promethazine (PHENERGAN) 25 MG tablet; Take 1 tablet (25 mg total) by mouth every 6 (six) hours as  needed for Nausea.  -     polyethylene glycol (GOLYTELY,NULYTELY) 236-22.74-6.74 -5.86 gram suspension; Drink 1 glass every 15 minutes starting at 5PM the night before the procedure until the bottle is empty.    cont f/u with Dr. Golden.

## 2017-10-06 ENCOUNTER — LAB VISIT (OUTPATIENT)
Dept: LAB | Facility: HOSPITAL | Age: 52
End: 2017-10-06
Attending: INTERNAL MEDICINE
Payer: COMMERCIAL

## 2017-10-06 DIAGNOSIS — C90.01 MULTIPLE MYELOMA IN REMISSION: ICD-10-CM

## 2017-10-06 DIAGNOSIS — N25.81 SECONDARY HYPERPARATHYROIDISM OF RENAL ORIGIN: ICD-10-CM

## 2017-10-06 DIAGNOSIS — D64.9 ABSOLUTE ANEMIA: ICD-10-CM

## 2017-10-06 DIAGNOSIS — R80.9 PROTEINURIA: Primary | ICD-10-CM

## 2017-10-06 DIAGNOSIS — N28.1 ACQUIRED CYST OF KIDNEY: ICD-10-CM

## 2017-10-06 LAB
ALBUMIN SERPL BCP-MCNC: 3.3 G/DL
ALP SERPL-CCNC: 78 U/L
ALT SERPL W/O P-5'-P-CCNC: 22 U/L
ANION GAP SERPL CALC-SCNC: 8 MMOL/L
AST SERPL-CCNC: 46 U/L
B2 MICROGLOB SERPL-MCNC: 3.7 UG/ML
BACTERIA #/AREA URNS HPF: ABNORMAL /HPF
BASOPHILS # BLD AUTO: 0.03 K/UL
BASOPHILS NFR BLD: 0.4 %
BILIRUB SERPL-MCNC: 0.5 MG/DL
BILIRUB UR QL STRIP: NEGATIVE
BUN SERPL-MCNC: 42 MG/DL
CALCIUM SERPL-MCNC: 9.4 MG/DL
CHLORIDE SERPL-SCNC: 107 MMOL/L
CLARITY UR: CLEAR
CO2 SERPL-SCNC: 26 MMOL/L
COLOR UR: YELLOW
CREAT SERPL-MCNC: 3.2 MG/DL
CREAT UR-MCNC: 141 MG/DL
DIFFERENTIAL METHOD: ABNORMAL
EOSINOPHIL # BLD AUTO: 0.6 K/UL
EOSINOPHIL NFR BLD: 8.2 %
ERYTHROCYTE [DISTWIDTH] IN BLOOD BY AUTOMATED COUNT: 13.5 %
EST. GFR  (AFRICAN AMERICAN): 24.2 ML/MIN/1.73 M^2
EST. GFR  (NON AFRICAN AMERICAN): 21 ML/MIN/1.73 M^2
FERRITIN SERPL-MCNC: 942 NG/ML
GLUCOSE SERPL-MCNC: 115 MG/DL
GLUCOSE UR QL STRIP: NEGATIVE
HCT VFR BLD AUTO: 28.8 %
HGB BLD-MCNC: 9.3 G/DL
HGB UR QL STRIP: NEGATIVE
HYALINE CASTS #/AREA URNS LPF: 2 /LPF
IRON SERPL-MCNC: 53 UG/DL
KETONES UR QL STRIP: NEGATIVE
LDH SERPL L TO P-CCNC: 204 U/L
LEUKOCYTE ESTERASE UR QL STRIP: NEGATIVE
LYMPHOCYTES # BLD AUTO: 2 K/UL
LYMPHOCYTES NFR BLD: 27.5 %
MAGNESIUM SERPL-MCNC: 1.9 MG/DL
MCH RBC QN AUTO: 31.2 PG
MCHC RBC AUTO-ENTMCNC: 32.3 G/DL
MCV RBC AUTO: 97 FL
MICROSCOPIC COMMENT: ABNORMAL
MONOCYTES # BLD AUTO: 0.9 K/UL
MONOCYTES NFR BLD: 11.7 %
NEUTROPHILS # BLD AUTO: 3.8 K/UL
NEUTROPHILS NFR BLD: 52.2 %
NITRITE UR QL STRIP: NEGATIVE
PH UR STRIP: 6 [PH] (ref 5–8)
PLATELET # BLD AUTO: 268 K/UL
PMV BLD AUTO: 9.1 FL
POTASSIUM SERPL-SCNC: 4.1 MMOL/L
PROT SERPL-MCNC: 7.2 G/DL
PROT UR QL STRIP: ABNORMAL
PROT UR-MCNC: 408 MG/DL
PROT/CREAT RATIO, UR: 2.89
PTH-INTACT SERPL-MCNC: 265 PG/ML
RBC # BLD AUTO: 2.98 M/UL
RBC #/AREA URNS HPF: 0 /HPF (ref 0–4)
SATURATED IRON: 19 %
SODIUM SERPL-SCNC: 141 MMOL/L
SP GR UR STRIP: 1.02 (ref 1–1.03)
SQUAMOUS #/AREA URNS HPF: 2 /HPF
TOTAL IRON BINDING CAPACITY: 280 UG/DL
TRANSFERRIN SERPL-MCNC: 189 MG/DL
URN SPEC COLLECT METH UR: ABNORMAL
WBC # BLD AUTO: 7.35 K/UL
WBC #/AREA URNS HPF: 1 /HPF (ref 0–5)

## 2017-10-06 PROCEDURE — 83540 ASSAY OF IRON: CPT

## 2017-10-06 PROCEDURE — 84165 PROTEIN E-PHORESIS SERUM: CPT

## 2017-10-06 PROCEDURE — 36415 COLL VENOUS BLD VENIPUNCTURE: CPT | Mod: PO

## 2017-10-06 PROCEDURE — 80053 COMPREHEN METABOLIC PANEL: CPT

## 2017-10-06 PROCEDURE — 82232 ASSAY OF BETA-2 PROTEIN: CPT

## 2017-10-06 PROCEDURE — 86334 IMMUNOFIX E-PHORESIS SERUM: CPT | Mod: 26,,, | Performed by: PATHOLOGY

## 2017-10-06 PROCEDURE — 83970 ASSAY OF PARATHORMONE: CPT

## 2017-10-06 PROCEDURE — 84156 ASSAY OF PROTEIN URINE: CPT

## 2017-10-06 PROCEDURE — 83520 IMMUNOASSAY QUANT NOS NONAB: CPT

## 2017-10-06 PROCEDURE — 86334 IMMUNOFIX E-PHORESIS SERUM: CPT

## 2017-10-06 PROCEDURE — 83735 ASSAY OF MAGNESIUM: CPT

## 2017-10-06 PROCEDURE — 85025 COMPLETE CBC W/AUTO DIFF WBC: CPT | Mod: PO

## 2017-10-06 PROCEDURE — 82728 ASSAY OF FERRITIN: CPT

## 2017-10-06 PROCEDURE — 81000 URINALYSIS NONAUTO W/SCOPE: CPT | Mod: PO

## 2017-10-06 PROCEDURE — 83615 LACTATE (LD) (LDH) ENZYME: CPT

## 2017-10-06 PROCEDURE — 84165 PROTEIN E-PHORESIS SERUM: CPT | Mod: 26,,, | Performed by: PATHOLOGY

## 2017-10-09 LAB
ALBUMIN SERPL ELPH-MCNC: 3.61 G/DL
ALPHA1 GLOB SERPL ELPH-MCNC: 0.34 G/DL
ALPHA2 GLOB SERPL ELPH-MCNC: 0.99 G/DL
B-GLOBULIN SERPL ELPH-MCNC: 0.88 G/DL
GAMMA GLOB SERPL ELPH-MCNC: 1.08 G/DL
INTERPRETATION SERPL IFE-IMP: NORMAL
KAPPA LC SER QL IA: 3.77 MG/DL
KAPPA LC/LAMBDA SER IA: 1.02
LAMBDA LC SER QL IA: 3.7 MG/DL
PATHOLOGIST INTERPRETATION IFE: NORMAL
PATHOLOGIST INTERPRETATION SPE: NORMAL
PROT SERPL-MCNC: 6.9 G/DL

## 2017-10-12 ENCOUNTER — TELEPHONE (OUTPATIENT)
Dept: HEMATOLOGY/ONCOLOGY | Facility: CLINIC | Age: 52
End: 2017-10-12

## 2017-10-12 NOTE — TELEPHONE ENCOUNTER
Returned call to patient regarding rescheduling today's appt, no answer, left message on voice mail to please call back to reschedule follow up

## 2017-10-12 NOTE — TELEPHONE ENCOUNTER
----- Message from Liliana Mccollum sent at 10/12/2017  9:46 AM CDT -----  Contact: self   Patient want to speak with a nurse regarding rescheduling appointment for October 12th please call back at [phone]

## 2017-10-17 RX ORDER — PYRIDOSTIGMINE BROMIDE 60 MG/1
TABLET ORAL
Qty: 180 TABLET | Refills: 2 | Status: SHIPPED | OUTPATIENT
Start: 2017-10-17 | End: 2018-02-12 | Stop reason: SDUPTHER

## 2017-10-17 NOTE — TELEPHONE ENCOUNTER
----- Message from Beltran Jara sent at 10/17/2017  3:34 PM CDT -----  Contact: self [phone]  .1. What is the name of the medication you are requesting? Mestinon  2. What is the dose? 60mg  3. How do you take the medication? Orally, topically, etc? orally  4. How often do you take this medication? 3x daily  5. Do you need a 30 day or 90 day supply? 30  6. How many refills are you requesting? 1  7. What is your preferred pharmacy and location of the pharmacy? .    [facility] [address] [place] [address] [address] [facility]  [address] [facility] [address]  Phone: [phone] Fax: [phone]      8. Who can we contact with further questions? Self [phone]

## 2017-10-31 ENCOUNTER — TELEPHONE (OUTPATIENT)
Dept: FAMILY MEDICINE | Facility: CLINIC | Age: 52
End: 2017-10-31

## 2017-10-31 NOTE — TELEPHONE ENCOUNTER
Romulo Linda with [facility] called stating pt c/o of a headache and blood pressure is 208/112. Romulo states he does recommend pt go to the ER because he is not complaining of chest pain. Requested to schedule an appointment with Dr. Stevenson. Advised Dr. Stevenson is out of the office. Scheduled with Abhijit Cox NP 11/01/17 @ 8:00. I tried to call pt to advise to go to the Er, no answer, also called pt's emergency contact, no answer or voicemail.

## 2017-10-31 NOTE — TELEPHONE ENCOUNTER
ANDREINA Fang  Was able to get in contact with the pt, and advised pt to go straight to the ER due to elevated bp and headache. Pt verbalized understanding and states he is heading there now.

## 2017-11-01 ENCOUNTER — OFFICE VISIT (OUTPATIENT)
Dept: FAMILY MEDICINE | Facility: CLINIC | Age: 52
End: 2017-11-01
Payer: COMMERCIAL

## 2017-11-01 VITALS
WEIGHT: 315 LBS | BODY MASS INDEX: 38.36 KG/M2 | HEIGHT: 76 IN | HEART RATE: 64 BPM | TEMPERATURE: 98 F | SYSTOLIC BLOOD PRESSURE: 129 MMHG | DIASTOLIC BLOOD PRESSURE: 72 MMHG

## 2017-11-01 DIAGNOSIS — I10 ESSENTIAL HYPERTENSION: Primary | ICD-10-CM

## 2017-11-01 DIAGNOSIS — N18.9 ANEMIA OF RENAL DISEASE: ICD-10-CM

## 2017-11-01 DIAGNOSIS — D63.1 ANEMIA OF RENAL DISEASE: ICD-10-CM

## 2017-11-01 PROCEDURE — 99213 OFFICE O/P EST LOW 20 MIN: CPT | Mod: S$GLB,,, | Performed by: NURSE PRACTITIONER

## 2017-11-01 PROCEDURE — 99999 PR PBB SHADOW E&M-EST. PATIENT-LVL III: CPT | Mod: PBBFAC,,, | Performed by: NURSE PRACTITIONER

## 2017-11-01 RX ORDER — TRAMADOL HYDROCHLORIDE 50 MG/1
50 TABLET ORAL
COMMUNITY
Start: 2017-10-31 | End: 2017-11-10

## 2017-11-01 NOTE — PROGRESS NOTES
Subjective:       Patient ID: Leodan Dan is a 51 y.o. male.    Chief Complaint: Hypertension; Headache; and Follow-up    Hypertension   This is a chronic problem. The problem has been waxing and waning since onset. Condition status: episode of elevated BP yesterday, seen in ER and treated for viral syndrome and dehydration. BP improved prior to discharge. Associated symptoms include malaise/fatigue. Pertinent negatives include no chest pain, headaches, palpitations or shortness of breath. Risk factors for coronary artery disease include obesity, male gender, diabetes mellitus and dyslipidemia. Past treatments include beta blockers and angiotensin blockers. Compliance problems include exercise.  Identifiable causes of hypertension include chronic renal disease.   Anemia   Presents for follow-up visit. Symptoms include malaise/fatigue. There has been no abdominal pain, fever or palpitations. Treatments tried: transfusions in the past. Followed by Dr Iqbal. Past medical history includes chronic renal disease.       Review of Systems   Constitutional: Positive for malaise/fatigue. Negative for fatigue, fever and unexpected weight change.   HENT: Negative for ear pain and sore throat.    Eyes: Negative.  Negative for pain and visual disturbance.   Respiratory: Negative for cough and shortness of breath.    Cardiovascular: Negative for chest pain and palpitations.   Gastrointestinal: Negative for abdominal pain, diarrhea, nausea and vomiting.   Genitourinary: Negative for dysuria and frequency.   Musculoskeletal: Negative for arthralgias and myalgias.   Skin: Negative for color change and rash.   Neurological: Negative for dizziness and headaches.   Psychiatric/Behavioral: Negative for sleep disturbance. The patient is not nervous/anxious.        Vitals:    11/01/17 0818   BP: 129/72   Pulse: 64   Temp: 98.3 °F (36.8 °C)       Objective:     Current Outpatient Prescriptions   Medication Sig Dispense Refill     ascorbic acid (VITAMIN C) 1000 MG tablet Take 1,000 mg by mouth once daily.      aspirin 325 MG tablet Take 325 mg by mouth 2 (two) times daily.       atorvastatin (LIPITOR) 40 MG tablet Take 1 tablet (40 mg total) by mouth once daily. 90 tablet 3    carvedilol (COREG) 25 MG tablet Take 25 mg by mouth.      ferrous sulfate 325 (65 FE) MG EC tablet Take 325 mg by mouth once daily. Not taking      furosemide (LASIX) 20 MG tablet Take 20 mg by mouth once daily.      hydrALAZINE (APRESOLINE) 25 MG tablet Take 1 tablet (25 mg total) by mouth 3 (three) times daily. (Patient taking differently: Take 50 mg by mouth every 12 (twelve) hours. ) 90 tablet 11    losartan (COZAAR) 100 MG tablet Take 100 mg by mouth once daily.      MEN'S MULTI-VITAMIN ORAL Take by mouth.      nifedipine (PROCARDIA-XL) 90 MG (OSM) 24 hr tablet Take 90 mg by mouth.      pyridostigmine (MESTINON) 60 mg Tab Take 2 tablets 3 times daily 180 tablet 2    tamsulosin (FLOMAX) 0.4 mg Cp24 Take 0.4 mg by mouth.      traMADol (ULTRAM) 50 mg tablet Take 50 mg by mouth.      UNKNOWN TO PATIENT Blood pressure med and recently increased from 60 mg to 90 mg a day      vitamin D 1000 units Tab Take 1,000 Units by mouth once daily. 2 tablets once daily in the morning with food       polyethylene glycol (GOLYTELY,NULYTELY) 236-22.74-6.74 -5.86 gram suspension Drink 1 glass every 15 minutes starting at 5PM the night before the procedure until the bottle is empty. 4000 mL 0    promethazine (PHENERGAN) 25 MG tablet Take 1 tablet (25 mg total) by mouth every 6 (six) hours as needed for Nausea. 6 tablet 0     No current facility-administered medications for this visit.        Physical Exam   Constitutional: He is oriented to person, place, and time. He appears well-developed. No distress.   obese   HENT:   Head: Normocephalic and atraumatic.   Eyes: EOM are normal. Pupils are equal, round, and reactive to light.   Neck: Normal range of motion. Neck  supple.   Cardiovascular: Normal rate and regular rhythm.    Pulmonary/Chest: Effort normal and breath sounds normal.   Musculoskeletal: Normal range of motion.   Neurological: He is alert and oriented to person, place, and time.   Skin: Skin is warm and dry. No rash noted.   Psychiatric: He has a normal mood and affect. Thought content normal.   Nursing note and vitals reviewed.      Assessment:       1. Essential hypertension    2. Anemia of renal disease        Plan:   Essential hypertension    Anemia of renal disease  -     CBC auto differential; Future; Expected date: 11/22/2017    he is going to repeat CBC in 3 weeks and follow up with Dr Rasheed Sebastian in about 3 weeks (around 11/22/2017) for labs.

## 2017-11-22 ENCOUNTER — LAB VISIT (OUTPATIENT)
Dept: LAB | Facility: HOSPITAL | Age: 52
End: 2017-11-22
Attending: NURSE PRACTITIONER
Payer: COMMERCIAL

## 2017-11-22 DIAGNOSIS — N18.9 ANEMIA OF RENAL DISEASE: ICD-10-CM

## 2017-11-22 DIAGNOSIS — D63.1 ANEMIA OF RENAL DISEASE: ICD-10-CM

## 2017-11-22 LAB
BASOPHILS # BLD AUTO: 0.06 K/UL
BASOPHILS NFR BLD: 0.8 %
DIFFERENTIAL METHOD: ABNORMAL
EOSINOPHIL # BLD AUTO: 0.7 K/UL
EOSINOPHIL NFR BLD: 8.5 %
ERYTHROCYTE [DISTWIDTH] IN BLOOD BY AUTOMATED COUNT: 13.5 %
HCT VFR BLD AUTO: 28.7 %
HGB BLD-MCNC: 9.3 G/DL
IMM GRANULOCYTES # BLD AUTO: 0.02 K/UL
IMM GRANULOCYTES NFR BLD AUTO: 0.3 %
LYMPHOCYTES # BLD AUTO: 2.3 K/UL
LYMPHOCYTES NFR BLD: 28.8 %
MCH RBC QN AUTO: 31.3 PG
MCHC RBC AUTO-ENTMCNC: 32.4 G/DL
MCV RBC AUTO: 97 FL
MONOCYTES # BLD AUTO: 0.8 K/UL
MONOCYTES NFR BLD: 10.4 %
NEUTROPHILS # BLD AUTO: 4.1 K/UL
NEUTROPHILS NFR BLD: 51.2 %
NRBC BLD-RTO: 0 /100 WBC
PLATELET # BLD AUTO: 279 K/UL
PMV BLD AUTO: 10.9 FL
RBC # BLD AUTO: 2.97 M/UL
WBC # BLD AUTO: 7.89 K/UL

## 2017-11-22 PROCEDURE — 36415 COLL VENOUS BLD VENIPUNCTURE: CPT | Mod: PO

## 2017-11-22 PROCEDURE — 85025 COMPLETE CBC W/AUTO DIFF WBC: CPT

## 2017-12-06 ENCOUNTER — LAB VISIT (OUTPATIENT)
Dept: LAB | Facility: HOSPITAL | Age: 52
End: 2017-12-06
Attending: SPECIALIST
Payer: COMMERCIAL

## 2017-12-06 DIAGNOSIS — N25.81 SECONDARY HYPERPARATHYROIDISM OF RENAL ORIGIN: ICD-10-CM

## 2017-12-06 DIAGNOSIS — R80.9 PROTEINURIA: ICD-10-CM

## 2017-12-06 DIAGNOSIS — N18.4 CHRONIC KIDNEY DISEASE, STAGE IV (SEVERE): Primary | ICD-10-CM

## 2017-12-06 DIAGNOSIS — D64.9 ANEMIA: ICD-10-CM

## 2017-12-06 LAB
ALBUMIN SERPL BCP-MCNC: 3.4 G/DL
ANION GAP SERPL CALC-SCNC: 9 MMOL/L
BACTERIA #/AREA URNS HPF: NORMAL /HPF
BASOPHILS # BLD AUTO: 0.03 K/UL
BASOPHILS NFR BLD: 0.3 %
BILIRUB UR QL STRIP: NEGATIVE
BUN SERPL-MCNC: 36 MG/DL
CALCIUM SERPL-MCNC: 9.9 MG/DL
CHLORIDE SERPL-SCNC: 108 MMOL/L
CLARITY UR: CLEAR
CO2 SERPL-SCNC: 25 MMOL/L
COLOR UR: YELLOW
CREAT SERPL-MCNC: 3.1 MG/DL
CREAT UR-MCNC: 57 MG/DL
DIFFERENTIAL METHOD: ABNORMAL
EOSINOPHIL # BLD AUTO: 0.7 K/UL
EOSINOPHIL NFR BLD: 7.2 %
ERYTHROCYTE [DISTWIDTH] IN BLOOD BY AUTOMATED COUNT: 13.6 %
EST. GFR  (AFRICAN AMERICAN): 25 ML/MIN/1.73 M^2
EST. GFR  (NON AFRICAN AMERICAN): 21.6 ML/MIN/1.73 M^2
GLUCOSE SERPL-MCNC: 103 MG/DL
GLUCOSE UR QL STRIP: NEGATIVE
HCT VFR BLD AUTO: 29.9 %
HGB BLD-MCNC: 9.6 G/DL
HGB UR QL STRIP: ABNORMAL
HYALINE CASTS #/AREA URNS LPF: 0 /LPF
IMM GRANULOCYTES # BLD AUTO: 0.03 K/UL
IMM GRANULOCYTES NFR BLD AUTO: 0.3 %
IRON SERPL-MCNC: 40 UG/DL
KETONES UR QL STRIP: NEGATIVE
LEUKOCYTE ESTERASE UR QL STRIP: NEGATIVE
LYMPHOCYTES # BLD AUTO: 2.3 K/UL
LYMPHOCYTES NFR BLD: 23.3 %
MCH RBC QN AUTO: 31 PG
MCHC RBC AUTO-ENTMCNC: 32.1 G/DL
MCV RBC AUTO: 97 FL
MICROSCOPIC COMMENT: NORMAL
MONOCYTES # BLD AUTO: 0.9 K/UL
MONOCYTES NFR BLD: 9.5 %
NEUTROPHILS # BLD AUTO: 5.8 K/UL
NEUTROPHILS NFR BLD: 59.4 %
NITRITE UR QL STRIP: NEGATIVE
NRBC BLD-RTO: 0 /100 WBC
PH UR STRIP: 6 [PH] (ref 5–8)
PHOSPHATE SERPL-MCNC: 3.8 MG/DL
PLATELET # BLD AUTO: 284 K/UL
PMV BLD AUTO: 10.4 FL
POTASSIUM SERPL-SCNC: 4.4 MMOL/L
PROT UR QL STRIP: ABNORMAL
PROT UR-MCNC: 223 MG/DL
PROT/CREAT RATIO, UR: 3.91
PTH-INTACT SERPL-MCNC: 239 PG/ML
RBC # BLD AUTO: 3.1 M/UL
RBC #/AREA URNS HPF: 1 /HPF (ref 0–4)
SATURATED IRON: 14 %
SODIUM SERPL-SCNC: 142 MMOL/L
SP GR UR STRIP: 1.01 (ref 1–1.03)
TOTAL IRON BINDING CAPACITY: 289 UG/DL
TRANSFERRIN SERPL-MCNC: 195 MG/DL
URN SPEC COLLECT METH UR: ABNORMAL
WBC # BLD AUTO: 9.71 K/UL
WBC #/AREA URNS HPF: 1 /HPF (ref 0–5)

## 2017-12-06 PROCEDURE — 80069 RENAL FUNCTION PANEL: CPT

## 2017-12-06 PROCEDURE — 83540 ASSAY OF IRON: CPT

## 2017-12-06 PROCEDURE — 81000 URINALYSIS NONAUTO W/SCOPE: CPT | Mod: PO

## 2017-12-06 PROCEDURE — 84156 ASSAY OF PROTEIN URINE: CPT

## 2017-12-06 PROCEDURE — 83970 ASSAY OF PARATHORMONE: CPT

## 2017-12-06 PROCEDURE — 85025 COMPLETE CBC W/AUTO DIFF WBC: CPT

## 2017-12-06 PROCEDURE — 87086 URINE CULTURE/COLONY COUNT: CPT

## 2017-12-06 PROCEDURE — 36415 COLL VENOUS BLD VENIPUNCTURE: CPT | Mod: PO

## 2017-12-07 LAB — BACTERIA UR CULT: NO GROWTH

## 2018-02-08 ENCOUNTER — LAB VISIT (OUTPATIENT)
Dept: LAB | Facility: HOSPITAL | Age: 53
End: 2018-02-08
Attending: SPECIALIST
Payer: COMMERCIAL

## 2018-02-08 DIAGNOSIS — R80.9 PROTEINURIA: ICD-10-CM

## 2018-02-08 DIAGNOSIS — N25.81 SECONDARY HYPERPARATHYROIDISM OF RENAL ORIGIN: ICD-10-CM

## 2018-02-08 DIAGNOSIS — N18.4 CHRONIC KIDNEY DISEASE, STAGE IV (SEVERE): Primary | ICD-10-CM

## 2018-02-08 DIAGNOSIS — D64.9 ANEMIA, UNSPECIFIED: ICD-10-CM

## 2018-02-08 LAB
ALBUMIN SERPL BCP-MCNC: 3.2 G/DL
ANION GAP SERPL CALC-SCNC: 10 MMOL/L
BACTERIA #/AREA URNS HPF: ABNORMAL /HPF
BASOPHILS # BLD AUTO: 0.04 K/UL
BASOPHILS NFR BLD: 0.4 %
BILIRUB UR QL STRIP: NEGATIVE
BUN SERPL-MCNC: 43 MG/DL
CALCIUM SERPL-MCNC: 9.6 MG/DL
CHLORIDE SERPL-SCNC: 106 MMOL/L
CLARITY UR: CLEAR
CO2 SERPL-SCNC: 25 MMOL/L
COLOR UR: YELLOW
CREAT SERPL-MCNC: 3.4 MG/DL
CREAT UR-MCNC: 102 MG/DL
DIFFERENTIAL METHOD: ABNORMAL
EOSINOPHIL # BLD AUTO: 0.8 K/UL
EOSINOPHIL NFR BLD: 8.7 %
ERYTHROCYTE [DISTWIDTH] IN BLOOD BY AUTOMATED COUNT: 13.5 %
EST. GFR  (AFRICAN AMERICAN): 22.4 ML/MIN/1.73 M^2
EST. GFR  (NON AFRICAN AMERICAN): 19.3 ML/MIN/1.73 M^2
GLUCOSE SERPL-MCNC: 178 MG/DL
GLUCOSE UR QL STRIP: NEGATIVE
HCT VFR BLD AUTO: 29.2 %
HGB BLD-MCNC: 9.4 G/DL
HGB UR QL STRIP: ABNORMAL
HYALINE CASTS #/AREA URNS LPF: 2 /LPF
IMM GRANULOCYTES # BLD AUTO: 0.02 K/UL
IMM GRANULOCYTES NFR BLD AUTO: 0.2 %
IRON SERPL-MCNC: 53 UG/DL
KETONES UR QL STRIP: NEGATIVE
LEUKOCYTE ESTERASE UR QL STRIP: NEGATIVE
LYMPHOCYTES # BLD AUTO: 1.9 K/UL
LYMPHOCYTES NFR BLD: 21.2 %
MCH RBC QN AUTO: 31.4 PG
MCHC RBC AUTO-ENTMCNC: 32.2 G/DL
MCV RBC AUTO: 98 FL
MICROSCOPIC COMMENT: ABNORMAL
MONOCYTES # BLD AUTO: 1 K/UL
MONOCYTES NFR BLD: 11.3 %
NEUTROPHILS # BLD AUTO: 5.2 K/UL
NEUTROPHILS NFR BLD: 58.2 %
NITRITE UR QL STRIP: NEGATIVE
NRBC BLD-RTO: 0 /100 WBC
PH UR STRIP: 6 [PH] (ref 5–8)
PHOSPHATE SERPL-MCNC: 3.6 MG/DL
PLATELET # BLD AUTO: 274 K/UL
PMV BLD AUTO: 10.6 FL
POTASSIUM SERPL-SCNC: 4.1 MMOL/L
PROT UR QL STRIP: ABNORMAL
PROT UR-MCNC: 428 MG/DL
PROT/CREAT RATIO, UR: 4.2
PTH-INTACT SERPL-MCNC: 196 PG/ML
RBC # BLD AUTO: 2.99 M/UL
RBC #/AREA URNS HPF: 1 /HPF (ref 0–4)
SATURATED IRON: 19 %
SODIUM SERPL-SCNC: 141 MMOL/L
SP GR UR STRIP: 1.02 (ref 1–1.03)
SQUAMOUS #/AREA URNS HPF: 2 /HPF
TOTAL IRON BINDING CAPACITY: 272 UG/DL
TRANSFERRIN SERPL-MCNC: 184 MG/DL
URN SPEC COLLECT METH UR: ABNORMAL
WBC # BLD AUTO: 8.93 K/UL
WBC #/AREA URNS HPF: 3 /HPF (ref 0–5)

## 2018-02-08 PROCEDURE — 82570 ASSAY OF URINE CREATININE: CPT

## 2018-02-08 PROCEDURE — 83970 ASSAY OF PARATHORMONE: CPT

## 2018-02-08 PROCEDURE — 36415 COLL VENOUS BLD VENIPUNCTURE: CPT | Mod: PO

## 2018-02-08 PROCEDURE — 83540 ASSAY OF IRON: CPT

## 2018-02-08 PROCEDURE — 80069 RENAL FUNCTION PANEL: CPT

## 2018-02-08 PROCEDURE — 81000 URINALYSIS NONAUTO W/SCOPE: CPT | Mod: PO

## 2018-02-08 PROCEDURE — 87086 URINE CULTURE/COLONY COUNT: CPT

## 2018-02-08 PROCEDURE — 85025 COMPLETE CBC W/AUTO DIFF WBC: CPT

## 2018-02-09 LAB — BACTERIA UR CULT: NO GROWTH

## 2018-02-12 RX ORDER — PYRIDOSTIGMINE BROMIDE 60 MG/1
TABLET ORAL
Qty: 180 TABLET | Refills: 2 | Status: SHIPPED | OUTPATIENT
Start: 2018-02-12 | End: 2018-06-02 | Stop reason: SDUPTHER

## 2018-03-03 DIAGNOSIS — I10 ESSENTIAL HYPERTENSION: ICD-10-CM

## 2018-03-04 RX ORDER — HYDRALAZINE HYDROCHLORIDE 50 MG/1
TABLET, FILM COATED ORAL
Qty: 60 TABLET | Refills: 5 | Status: SHIPPED | OUTPATIENT
Start: 2018-03-04 | End: 2018-04-11 | Stop reason: SDUPTHER

## 2018-03-08 ENCOUNTER — TELEPHONE (OUTPATIENT)
Dept: HEMATOLOGY/ONCOLOGY | Facility: CLINIC | Age: 53
End: 2018-03-08

## 2018-03-08 DIAGNOSIS — C90.01 MULTIPLE MYELOMA IN REMISSION: Primary | ICD-10-CM

## 2018-03-08 NOTE — TELEPHONE ENCOUNTER
----- Message from Sabine Huitron sent at 3/8/2018  3:19 PM CST -----  Contact: Patient  Leodan, patient [phone] calling to schedule a follow up appointment. Please advise. Thanks.

## 2018-03-12 ENCOUNTER — LAB VISIT (OUTPATIENT)
Dept: LAB | Facility: HOSPITAL | Age: 53
End: 2018-03-12
Attending: NURSE PRACTITIONER
Payer: COMMERCIAL

## 2018-03-12 DIAGNOSIS — C90.01 MULTIPLE MYELOMA IN REMISSION: ICD-10-CM

## 2018-03-12 LAB
BASOPHILS # BLD AUTO: 0.05 K/UL
BASOPHILS NFR BLD: 0.6 %
DIFFERENTIAL METHOD: ABNORMAL
EOSINOPHIL # BLD AUTO: 0.5 K/UL
EOSINOPHIL NFR BLD: 5.1 %
ERYTHROCYTE [DISTWIDTH] IN BLOOD BY AUTOMATED COUNT: 14 %
HCT VFR BLD AUTO: 30.7 %
HGB BLD-MCNC: 9.9 G/DL
LDH SERPL L TO P-CCNC: 200 U/L
LYMPHOCYTES # BLD AUTO: 2.2 K/UL
LYMPHOCYTES NFR BLD: 24 %
MCH RBC QN AUTO: 31.3 PG
MCHC RBC AUTO-ENTMCNC: 32.2 G/DL
MCV RBC AUTO: 97 FL
MONOCYTES # BLD AUTO: 1 K/UL
MONOCYTES NFR BLD: 11 %
NEUTROPHILS # BLD AUTO: 5.3 K/UL
NEUTROPHILS NFR BLD: 59.3 %
PLATELET # BLD AUTO: 246 K/UL
PMV BLD AUTO: 9.3 FL
RBC # BLD AUTO: 3.16 M/UL
WBC # BLD AUTO: 8.94 K/UL

## 2018-03-12 PROCEDURE — 86334 IMMUNOFIX E-PHORESIS SERUM: CPT | Mod: 26,,, | Performed by: PATHOLOGY

## 2018-03-12 PROCEDURE — 84165 PROTEIN E-PHORESIS SERUM: CPT | Mod: 26,,, | Performed by: PATHOLOGY

## 2018-03-12 PROCEDURE — 82232 ASSAY OF BETA-2 PROTEIN: CPT

## 2018-03-12 PROCEDURE — 83735 ASSAY OF MAGNESIUM: CPT

## 2018-03-12 PROCEDURE — 36415 COLL VENOUS BLD VENIPUNCTURE: CPT | Mod: PO

## 2018-03-12 PROCEDURE — 84165 PROTEIN E-PHORESIS SERUM: CPT

## 2018-03-12 PROCEDURE — 85025 COMPLETE CBC W/AUTO DIFF WBC: CPT | Mod: PO

## 2018-03-12 PROCEDURE — 83615 LACTATE (LD) (LDH) ENZYME: CPT

## 2018-03-12 PROCEDURE — 86334 IMMUNOFIX E-PHORESIS SERUM: CPT

## 2018-03-12 PROCEDURE — 80053 COMPREHEN METABOLIC PANEL: CPT

## 2018-03-12 PROCEDURE — 83520 IMMUNOASSAY QUANT NOS NONAB: CPT | Mod: 59

## 2018-03-13 LAB
ALBUMIN SERPL BCP-MCNC: 3.5 G/DL
ALBUMIN SERPL ELPH-MCNC: 3.55 G/DL
ALP SERPL-CCNC: 75 U/L
ALPHA1 GLOB SERPL ELPH-MCNC: 0.33 G/DL
ALPHA2 GLOB SERPL ELPH-MCNC: 0.99 G/DL
ALT SERPL W/O P-5'-P-CCNC: 28 U/L
ANION GAP SERPL CALC-SCNC: 10 MMOL/L
AST SERPL-CCNC: 45 U/L
B-GLOBULIN SERPL ELPH-MCNC: 0.88 G/DL
B2 MICROGLOB SERPL-MCNC: 5.2 UG/ML
BILIRUB SERPL-MCNC: 0.4 MG/DL
BUN SERPL-MCNC: 34 MG/DL
CALCIUM SERPL-MCNC: 9.6 MG/DL
CHLORIDE SERPL-SCNC: 108 MMOL/L
CO2 SERPL-SCNC: 24 MMOL/L
CREAT SERPL-MCNC: 3 MG/DL
EST. GFR  (AFRICAN AMERICAN): 26 ML/MIN/1.73 M^2
EST. GFR  (NON AFRICAN AMERICAN): 22.5 ML/MIN/1.73 M^2
GAMMA GLOB SERPL ELPH-MCNC: 1.05 G/DL
GLUCOSE SERPL-MCNC: 115 MG/DL
INTERPRETATION SERPL IFE-IMP: NORMAL
KAPPA LC SER QL IA: 4.82 MG/DL
KAPPA LC/LAMBDA SER IA: 1.3
LAMBDA LC SER QL IA: 3.7 MG/DL
MAGNESIUM SERPL-MCNC: 2 MG/DL
PATHOLOGIST INTERPRETATION IFE: NORMAL
PATHOLOGIST INTERPRETATION SPE: NORMAL
POTASSIUM SERPL-SCNC: 4.3 MMOL/L
PROT SERPL-MCNC: 6.8 G/DL
PROT SERPL-MCNC: 7.2 G/DL
SODIUM SERPL-SCNC: 142 MMOL/L

## 2018-03-16 ENCOUNTER — OFFICE VISIT (OUTPATIENT)
Dept: HEMATOLOGY/ONCOLOGY | Facility: CLINIC | Age: 53
End: 2018-03-16
Payer: COMMERCIAL

## 2018-03-16 VITALS
WEIGHT: 315 LBS | HEART RATE: 62 BPM | HEIGHT: 76 IN | DIASTOLIC BLOOD PRESSURE: 90 MMHG | RESPIRATION RATE: 20 BRPM | TEMPERATURE: 99 F | BODY MASS INDEX: 38.36 KG/M2 | SYSTOLIC BLOOD PRESSURE: 182 MMHG

## 2018-03-16 DIAGNOSIS — C90.01 MULTIPLE MYELOMA IN REMISSION: Primary | ICD-10-CM

## 2018-03-16 PROCEDURE — 3080F DIAST BP >= 90 MM HG: CPT | Mod: CPTII,S$GLB,, | Performed by: NURSE PRACTITIONER

## 2018-03-16 PROCEDURE — 99999 PR PBB SHADOW E&M-EST. PATIENT-LVL IV: CPT | Mod: PBBFAC,,, | Performed by: NURSE PRACTITIONER

## 2018-03-16 PROCEDURE — 99213 OFFICE O/P EST LOW 20 MIN: CPT | Mod: S$GLB,,, | Performed by: NURSE PRACTITIONER

## 2018-03-16 PROCEDURE — 3077F SYST BP >= 140 MM HG: CPT | Mod: CPTII,S$GLB,, | Performed by: NURSE PRACTITIONER

## 2018-03-16 NOTE — PROGRESS NOTES
HISTORY OF PRESENT ILLNESS:  This is a 52-year-old black gentleman known to Dr. Iqbal for multiple myeloma that went into remission following therapy with   ZRD.  He had a protracted course of maintenance Revlimid before discontinuation   of therapy and is now observed.  He presents late to the clinic for interval followup with no   complaints.  He reports that he was admitted to [facility] in October, 2017 for elevated   BP and HA.  He denies any fevers, chills, drenching night sweats, lymphadenopathy,   abdominal discomfort, nausea, vomiting, constipation, diarrhea, rashes, pruritus, painful   lymphadenopathy, etc. No other new complaints or findings on a 14 point review of systems.    PHYSICAL EXAMINATION:  GENERAL:  Well-developed, morbidly obese, black gentleman in no acute distress.    Alert and oriented x3.  VITAL SIGNS:    Wt Readings from Last 3 Encounters:   03/16/18 (!) 221.9 kg (489 lb 3.2 oz)   11/01/17 (!) 212.8 kg (469 lb 3.2 oz)   08/16/17 (!) 208.4 kg (459 lb 8.8 oz)     Temp Readings from Last 3 Encounters:   03/16/18 98.5 °F (36.9 °C)   11/01/17 98.3 °F (36.8 °C) (Oral)   08/16/17 98.7 °F (37.1 °C) (Oral)     BP Readings from Last 3 Encounters:   03/16/18 (!) 182/90   11/01/17 129/72   08/16/17 137/77     Pulse Readings from Last 3 Encounters:   03/16/18 62   11/01/17 64   08/16/17 69   HEENT:  Normocephalic, atraumatic.  Oral mucosa pink and moist.  Lips without   lesions.  Tongue midline.  Oropharynx clear.  Nonicteric sclerae.   NECK:  Supple.  No adenopathy.                                               HEART:  Regular rate and rhythm without murmur, gallop or rub.               LUNGS:  Clear to auscultation bilaterally.                                   ABDOMEN:  Soft, nontender and nondistended with positive normoactive bowel   sounds.  No hepatosplenomegaly.                                              EXTREMITIES:  No cyanosis, clubbing or edema.  Distal pulses are intact.     No  erythema or drainage.  AXILLAE AND GROIN:  No palpable lymphadenopathy appreciated.    LABORATORY:    Lab Results   Component Value Date    WBC 8.94 03/12/2018    HGB 9.9 (L) 03/12/2018    HCT 30.7 (L) 03/12/2018    MCV 97 03/12/2018     03/12/2018     Unremarkable differential    CMP  Sodium   Date Value Ref Range Status   03/12/2018 142 136 - 145 mmol/L Final     Potassium   Date Value Ref Range Status   03/12/2018 4.3 3.5 - 5.1 mmol/L Final     Chloride   Date Value Ref Range Status   03/12/2018 108 95 - 110 mmol/L Final     CO2   Date Value Ref Range Status   03/12/2018 24 23 - 29 mmol/L Final     Glucose   Date Value Ref Range Status   03/12/2018 115 (H) 70 - 110 mg/dL Final     BUN, Bld   Date Value Ref Range Status   03/12/2018 34 (H) 6 - 20 mg/dL Final     Creatinine   Date Value Ref Range Status   03/12/2018 3.0 (H) 0.5 - 1.4 mg/dL Final     Calcium   Date Value Ref Range Status   03/12/2018 9.6 8.7 - 10.5 mg/dL Final     Total Protein   Date Value Ref Range Status   03/12/2018 7.2 6.0 - 8.4 g/dL Final     Albumin   Date Value Ref Range Status   03/12/2018 3.5 3.5 - 5.2 g/dL Final     Total Bilirubin   Date Value Ref Range Status   03/12/2018 0.4 0.1 - 1.0 mg/dL Final     Comment:     For infants and newborns, interpretation of results should be based  on gestational age, weight and in agreement with clinical  observations.  Premature Infant recommended reference ranges:  Up to 24 hours.............<8.0 mg/dL  Up to 48 hours............<12.0 mg/dL  3-5 days..................<15.0 mg/dL  6-29 days.................<15.0 mg/dL       Alkaline Phosphatase   Date Value Ref Range Status   03/12/2018 75 55 - 135 U/L Final     AST   Date Value Ref Range Status   03/12/2018 45 (H) 10 - 40 U/L Final     ALT   Date Value Ref Range Status   03/12/2018 28 10 - 44 U/L Final     Anion Gap   Date Value Ref Range Status   03/12/2018 10 8 - 16 mmol/L Final     eGFR if    Date Value Ref Range Status    03/12/2018 26.0 (A) >60 mL/min/1.73 m^2 Final     eGFR if non    Date Value Ref Range Status   03/12/2018 22.5 (A) >60 mL/min/1.73 m^2 Final     Comment:     Calculation used to obtain the estimated glomerular filtration  rate (eGFR) is the CKD-EPI equation.         ,  Magnesium 2.0, Beta-2 microglobulin 5.2.      Free light chain analysis; kappa/lambda ratio at 1.30 (1.02, 1.26, 1.43, 1.55).    SPEP impression:  Normal total protein, no monoclonal peaks identified.    KATELYN impression:  No monoclonal peaks identified.    IMPRESSION:  1.  Multiple myeloma - no evidence of relapse.  2.  HTN - followed by Dr. EMILIA Anderson ([facility])  2.  Chronic disease physiology anemia   3.  Stage III chronic kidney disease with declining function - follow up with Dr. Soha Bruner, recent iron studies & iron replacement  4.  Diabetes mellitus.  5.  Right foot ulcer - under wound care, almost healed  6.  Status post TKA, left.    PLAN:  In regards to #1, continue observation and have the patient return in three months   with interval CBC, CMP, LDH, magnesium, SPEP, KATELYN, beta-2 microglobulin and   free light chain analysis prior to appointment.      Assessment/plan reviewed and approved by Dr. Iqbal.

## 2018-04-03 ENCOUNTER — PATIENT OUTREACH (OUTPATIENT)
Dept: ADMINISTRATIVE | Facility: HOSPITAL | Age: 53
End: 2018-04-03

## 2018-04-05 ENCOUNTER — PATIENT OUTREACH (OUTPATIENT)
Dept: ADMINISTRATIVE | Facility: CLINIC | Age: 53
End: 2018-04-05

## 2018-04-05 NOTE — PROGRESS NOTES
Discharge Information     Discharge Date:  4/3/18          Primary Discharge Diagnosis:  Accelerated hypertension          Josefina Hooks RN attempted to contact patient. No answer. The following message was left for the patient to return the call:  Good afternoon,  I am a nurse calling on behalf of [facility] from the [facility].  This is a Transitional Care Call for Leodan Dan. When you have a moment please contact us at [phone] or [phone] Monday through Friday, between the hours of 8 am to 4 pm. We look forward to speaking with you. On behalf of [facility] have a nice day.    The patient has a scheduled HOSFU appointment with Juan Stevenson MD on 4/25/18 @ 1420hrs. Message sent to Physician staff.

## 2018-04-05 NOTE — PATIENT INSTRUCTIONS
"  Discharge Instructions for High Blood Pressure (Hypertension)  You have been diagnosed with high blood pressure (also called hypertension). This means the force of blood against your artery walls is too strong. It also means your heart is working hard to move blood. High blood pressure usually has no symptoms, but over time, it can damage your heart, blood vessels, eyes, kidneys, and other organs. With help from your doctor, you can manage your blood pressure and protect your health.  Taking medicine  · Learn to take your own blood pressure. Keep a record of your results. Ask your doctor which readings mean that you need medical attention.  · Take your blood pressure medicine exactly as directed. Dont skip doses. Missing doses can cause your blood pressure to get out of control.  · If you do miss a dose (or doses) check with your healthcare provider about what to do.  · Avoid medicine that contain heart stimulants, including over-the-counter drugs. Check for warnings about high blood pressure on the label. Ask the pharmacist before purchasing something you haven't used before  · Check with your doctor or pharmacist before taking a decongestant. Some decongestants can worsen high blood pressure.  Lifestyle changes  · Maintain a healthy weight. Get help to lose any extra pounds.  · Cut back on salt.  ¨ Limit canned, dried, packaged, and fast foods.  ¨ Dont add salt to your food at the table.  ¨ Season foods with herbs instead of salt when you cook.  ¨ Request no added salt when you go to a restaurant.  ¨ The American Heart Associations (AHA) "ideal" sodium intake recommendation is 1,500 milligrams per day.  However, since American's eat so much salt, the AHA says a positive change can occur by cutting back to even 2,400 milligrams of sodium a day.   · Follow the DASH (Dietary Approaches to Stop Hypertension) eating plan. This plan recommends vegetables, fruits, whole gains, and other heart healthy foods.  · Begin " an exercise program. Ask your doctor how to get started. The American Heart Association recommends aerobic exercise 3 to 4 times a week for an average of 40 minutes at a time, with your doctor's approval. Simple activities like walking or gardening can help.  · Break the smoking habit. Enroll in a stop-smoking program to improve your chances of success. Ask your healthcare provider about programs and medicines to help you stop smoking.  · Limit drinks that contain caffeine (coffee, black or green tea, cola) to 2 per day.  · Never take stimulants such as amphetamines or cocaine; these drugs can be deadly for someone with high blood pressure.  · Control your stress. Learn stress-management techniques.  · Limit alcohol to no more than 1 drink a day for women and 2 drinks a day for men.  Follow-up care  Make a follow-up appointment as directed by our staff.     When to seek medical care  Call your doctor immediately or seek emergency care if you have any of the following:  · Chest pain or shortness of breath (call 911)  · Moderate to severe headache  · Weakness in the muscles of your face, arms, or legs  · Trouble speaking  · Extreme drowsiness  · Confusion  · Fainting or dizziness  · Pulsating or rushing sound in your ears  · Unexplained nosebleed  · Weakness, tingling, or numbness of your face, arms, or legs  · Change in vision  · Blood pressure measured at home that is greater than 180/110   Date Last Reviewed: 4/27/2016  © 3800-9736 [employer]. [address] [place] [address], [place] [address]. All rights reserved. This information is not intended as a substitute for professional medical care. Always follow your healthcare professional's instructions.

## 2018-04-11 ENCOUNTER — OFFICE VISIT (OUTPATIENT)
Dept: FAMILY MEDICINE | Facility: CLINIC | Age: 53
End: 2018-04-11
Payer: COMMERCIAL

## 2018-04-11 ENCOUNTER — PATIENT OUTREACH (OUTPATIENT)
Dept: ADMINISTRATIVE | Facility: HOSPITAL | Age: 53
End: 2018-04-11

## 2018-04-11 ENCOUNTER — LAB VISIT (OUTPATIENT)
Dept: LAB | Facility: HOSPITAL | Age: 53
End: 2018-04-11
Attending: FAMILY MEDICINE
Payer: COMMERCIAL

## 2018-04-11 VITALS
BODY MASS INDEX: 38.36 KG/M2 | WEIGHT: 315 LBS | SYSTOLIC BLOOD PRESSURE: 110 MMHG | DIASTOLIC BLOOD PRESSURE: 56 MMHG | HEIGHT: 76 IN | HEART RATE: 68 BPM

## 2018-04-11 DIAGNOSIS — M62.82 NON-TRAUMATIC RHABDOMYOLYSIS: ICD-10-CM

## 2018-04-11 DIAGNOSIS — N18.4 CKD (CHRONIC KIDNEY DISEASE), STAGE IV: ICD-10-CM

## 2018-04-11 DIAGNOSIS — I10 ESSENTIAL HYPERTENSION: ICD-10-CM

## 2018-04-11 DIAGNOSIS — I10 ESSENTIAL HYPERTENSION: Primary | ICD-10-CM

## 2018-04-11 LAB
ANION GAP SERPL CALC-SCNC: 10 MMOL/L
BUN SERPL-MCNC: 97 MG/DL
CALCIUM SERPL-MCNC: 10.1 MG/DL
CHLORIDE SERPL-SCNC: 108 MMOL/L
CK SERPL-CCNC: 1018 U/L
CO2 SERPL-SCNC: 21 MMOL/L
CREAT SERPL-MCNC: 5.2 MG/DL
EST. GFR  (AFRICAN AMERICAN): 13.4 ML/MIN/1.73 M^2
EST. GFR  (NON AFRICAN AMERICAN): 11.6 ML/MIN/1.73 M^2
GLUCOSE SERPL-MCNC: 110 MG/DL
POTASSIUM SERPL-SCNC: 4.7 MMOL/L
SODIUM SERPL-SCNC: 139 MMOL/L

## 2018-04-11 PROCEDURE — 99999 PR PBB SHADOW E&M-EST. PATIENT-LVL II: CPT | Mod: PBBFAC,,, | Performed by: FAMILY MEDICINE

## 2018-04-11 PROCEDURE — 36415 COLL VENOUS BLD VENIPUNCTURE: CPT | Mod: PO

## 2018-04-11 PROCEDURE — 82550 ASSAY OF CK (CPK): CPT

## 2018-04-11 PROCEDURE — 80048 BASIC METABOLIC PNL TOTAL CA: CPT

## 2018-04-11 PROCEDURE — 99496 TRANSJ CARE MGMT HIGH F2F 7D: CPT | Mod: S$GLB,,, | Performed by: FAMILY MEDICINE

## 2018-04-11 RX ORDER — CALCITRIOL 0.5 UG/1
0.5 CAPSULE ORAL
COMMUNITY
End: 2019-08-02

## 2018-04-11 RX ORDER — HYDRALAZINE HYDROCHLORIDE 50 MG/1
50 TABLET, FILM COATED ORAL EVERY 8 HOURS
Qty: 90 TABLET | Refills: 11 | Status: SHIPPED | OUTPATIENT
Start: 2018-04-11 | End: 2019-04-12

## 2018-04-11 NOTE — PROGRESS NOTES
Subjective:      Patient ID: Leodan Dan is a 52 y.o. male.    Chief Complaint: Hospital Follow Up      The patient was recently hospitalized at [facility] for accelerated htn and rhabdo.  He did not get taken off of the lipitor.  He had a CPK of 900-1000.  The discharge summary from the hospitalization is copied below for reference and completeness.      Transitional Care Note    Family and/or Caretaker present at visit?  No.  Diagnostic tests reviewed/disposition: No diagnosic tests pending after this hospitalization.  Disease/illness education: he understands whe he had.  Home health/community services discussion/referrals: Patient does not have home health established from hospital visit.  They do not need home health.  If needed, we will set up home health for the patient.   Establishment or re-establishment of referral orders for community resources: No other necessary community resources.   Discussion with other health care providers: No discussion with other health care providers necessary.     Problem List Items Addressed This Visit     None        Discharge Summaries  - in this encounter    Carlos Ibarra MD - 04/03/2018 12:07 PM CDT  Formatting of this note may be different from the original.  [facility] DISCHARGE SUMMARY    Patient ID:  Leodan Dan  [id number]  52 y.o.  1965    Admit Date:   3/31/2018 5:14 PM    Discharge Date:   Discharge Today: 4/3/2018    Admitting Physician:     Discharge Physician:   Carlos Ibarra MD    Consults:  Consultants   Provider Service Role Specialty   Mary Kay Larson, NP - Nurse Practitioner Nurse Practitioner   Soha Bruner MD Nephrology Consulting Physician Nephrology   Vish Lee MD Cardiology Consulting Physician Interventional Cardiology   Kathy Thornton, NP - Nurse Practitioner Nurse Practitioner Family       Reason for Admission/Admission Diagnoses:   Present on Admission:   Non-traumatic rhabdomyolysis   Venous stasis  ulcer (HCC)   (Resolved) Morbid obesity (HCC)   Left ventricular diastolic dysfunction with preserved systolic function   Bilateral edema of lower extremity   (Resolved) Bradycardia   CKD stage 4 secondary to hypertension (HCC)   BARKER (dyspnea on exertion)   GERD (gastroesophageal reflux disease)   Accelerated hypertension   (Resolved) Iron deficiency anemia due to chronic blood loss   Anemia of chronic renal failure, unspecified CKD stage   Elevated lipase   Idiopathic chronic venous hypertension of right lower extremity with ulcer (HCC)   Multiple myeloma in remission (HCC)   Myasthenia gravis associated with thymoma (HCC)    Discharge Diagnoses:   Active Hospital Problems   Diagnosis Date Noted    Accelerated hypertension 05/31/2017    Morbid obesity with BMI of 50.0-59.9, adult 04/01/2018   Chronic    GERD (gastroesophageal reflux disease) 04/01/2018   Chronic    Anemia of chronic renal failure, unspecified CKD stage 04/01/2018   Chronic    Elevated lipase 04/01/2018    Glucose intolerance 04/01/2018    Non-traumatic rhabdomyolysis 03/31/2018    Bilateral edema of lower extremity 05/10/2017   Chronic    Myasthenia gravis associated with thymoma (HCC) 06/08/2016    ABDULAZIZ on CPAP   Chronic    Left ventricular diastolic dysfunction with preserved systolic function 01/13/2016   Chronic    BARKER (dyspnea on exertion) 01/13/2016    Idiopathic chronic venous hypertension of right lower extremity with ulcer (HCC) 11/13/2015    Venous stasis ulcer (HCC) 12/13/2013   Chronic    CKD stage 4 secondary to hypertension (HCC) 05/16/2013   Chronic    Multiple myeloma in remission (HCC) 09/05/2012     Resolved Hospital Problems   Diagnosis Date Noted Date Resolved    Morbid obesity (HCC) 04/01/2018    Iron deficiency anemia due to chronic blood loss 10/12/2015 04/01/2018    Bradycardia 02/25/2013 04/01/2018     History of Present Illness:     Leodan Dan is a 52 y.o. male that has a past medical  history of Anemia; Arthritis; Chronic kidney disease; Encounter for blood transfusion; GERD (gastroesophageal reflux disease); Hormone disorder; MRSA infection (10/2013); Hypertension; Kidney stone; Multiple myeloma; Myasthenia gravis; Sleep apnea; and Urinary tract infection. History obtained from the patient and EMR.     Mr. Dan presents to [facility] ED ambulatory to triage with c/o elevated blood pressure over the last week associated with persistent headache, fatigue and BARKER. Denies fall/trauma or injury. Patient reports he is currently compliant with antihypertensive medications. Denies chest pain, palpitations, syncope, fever, chills, diaphoresis, nausea, vomiting. Patient reports intermittent left-sided abdominal pain. Denies dysuria or frequency. Denies hemoptysis, hematemesis, hematochezia or BRBPR. Denies specific exacerbating or alleviating factors. Denies vision changes.     In the emergency department /104,HR 70, Resp 18, Sats 96% on room air. Temp 98.3. WBC 8.3, H&H 9.8/28.4, platelet 257, glucose 178, nightly 40, creatinine 3.34, sodium 138, potassium 3.7, chloride 105, albumin 3.3, AST 49, GFR 23, lipase 70, , troponin negative, BNP 66. Urinalysis: Blood negative RBC 4, protein 300, WBC 2, hyaline casts 2. CT head: No intracranial abnormality or acute finding. 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  EKG: Sinus bradycardia, rate 57, no acute ischemic changes.     Hospital medicine has been asked for further evaluation and treatment.        Hospital Course and Treatment:   Admission Information   Date & Time  3/31/2018 Provider  Carlos Ibarra MD Department  [facility] Tele[facility] Dept. Phone  [phone]           -Accelerated hypertension-patient on multiple medications, obese. Increased hydralazine to 50 TID with improvement, Continue Cozaar, Procardia, Coreg Lasix orally twice a day. PRN  hydralazine IV     -Nontraumatic rhabdomyolysis. Stable, No further workup. Stable in 900's for 4 days.     Anemia of chronic disease- stable H&H yesterday of 10.3/30.2,     -Sleep apnea-continue CPAP, unclear if current settings need to be titrated as an outpatient maybe causing elevated hypertension     -CKD stage IV-secondary to uncontrolled hypertension, nephrology followed. Creatinine stable at 3.0. Patient with proteinuria with 300 and urine     -Chronic venous ulcer/slow to heal ankle wound status post spider bite-continue wound care by nursing. The patient has been seeing outpatient wound care for some time and this is healing. WOC consulted. Continue oral Lasix for chronic lower extremity edema     -Morbid obesity-BMI 59.7, patient heavily counseled on weight loss     -History of multiple myeloma- in remission, stable     -History of myasthenia gravis- no evidence of flare. Continue Mestinon.       I discussed with the patient disease process and treatment.     I have personally seen and examined the patient, Leodan Dan, in a face to face encounter on the date of discharge.     He is cleared for discharge with instructions to follow up as directed.   Total time in the care and discharge planning of this patient was greater than 30 minutes.    Significant Diagnostic Studies:  Recent Imaging and Procedure Results   Procedure Component Value Ref Range Date/Time   Echo Contrast [[id number]] Collected: 04/01/2018 0704   Updated: 04/01/2018 1334   PatientHeight 193.04 cm   PatientWeight 218.68899858 kg   Systolic Pressure 165 mmHg   Diastolic Pressure 84 mmHg   StudyLocation [facility]   BSA 3.31243 m^2   2D/MMode measurements and calculations: MMode 2D Measurements & Calculations   Interventricular Septum in Diastole 1.4043 cm   Left Ventricle in Diastole 5.75464 cm   Left Ventricle in Systole 4.30638 cm   LV post wall in Diastole 1.44323 cm   IVS/LVPW 1.25   FS 22.6452 %   EDV(Teich) 179.091 ml   ESV(Teich) 98.8803  ml   EF(Teich) 44.7878 %   EDV(cubed) 214.57 ml   ESV(cubed) 99.3185 ml   EF(cubed) 53.7128 %   LV mass(C)d 335.642 grams   LV mass(C)dI 104.333 grams/m^2   SV(Teich) 80.2109 ml   SI(Teich) 24.9332 ml/m^2   SV(cubed) 115.251 ml   SI(cubed) 35.8254 ml/m^2   Ao root diameter 4.16621 cm   Ao root area 15.0879 cm^2   LA Dimension 4.18588 cm   LA/Ao 1.98025   Doppler measurements and calculations: Doppler Measurements & Calculations   MV e max velocity 109.2 cm/sec   MV a velocity 86.2118 cm/sec   MV e/a ratio 1.13160   MV Dec slope 605.611 cm/sec^2   MV dec time 0.050235 sec   Ao V2 Max 175.982 cm/sec   Ao max PG 12.3879 mmHg   EF MIN = 55 %   EF MAX = 60 %   Narrative:       [facility]  [address]  [place] [address]  [phone]    Adult Echocardiogram  Name: LOUISE FERNANDEZ Study Date: 2018  MRN: [id number] Age: 52 yrs  [facility] [place] [facility]\S\4425 Height: 76 in  : 1965 Weight: 481 lb  Gender: Male BSA: 3.2 m2  Reason For Study: heart failure  History: HEART FAILURE  BP: 165/84 mmHg    Ordering Physician: STEFANI ROMAN  Performed By: KARLY Holman    Interpretation Summary  The study was technically difficult.  Ejection Fraction = 55-60%.  Definity contrast used to eval EF.  Left ventricular systolic function is normal.  The study was technically difficult.    Measurements with Normals  IVSd: 1.4 cm (0.6-1.1 cm) LVIDd: 6.0 cm (3.7-5.6 cm)  LVPWd: 1.1 cm (0.6-1.1 cm) LVIDs: 4.6 cm (2.3-3.9 cm)  Ao root diam: 4.4 cm (2.0-3.7 cm)  LA dimension: 4.7 cm (1.9-4.0 cm)    MMode/2D Measurements \T\ Calculations  FS: 22.6 % Ao root area: 15.1 cm2  EDV(Teich): 179.1 ml  ESV(Teich): 98.9 ml  EF(Teich): 44.8 %    Doppler Measurements \T\ Calculations  MV E max temitope: 109.2 cm/sec MV dec slope: 605.6 cm/sec2  MV A max temitope: 86.2 cm/sec MV dec time: 0.20 sec  MV E/A: 1.3  Ao V2 max: 176.0 cm/sec  Ao max P.4 mmHg    LEFT VENTRICLE    o The left ventricle is normal in size.  o Ejection  Fraction = 55-60%.  o Definity contrast used to eval EF.  o Left ventricular systolic function is normal.  o There is normal left ventricular wall thickness.    RIGHT VENTRICLE    o The right ventricle is normal in size and function.    ATRIA    o The left atrial size is normal.  o Right atrial size is normal.    MITRAL VALVE    o The mitral valve is normal in structure and function.  o There is trace to mild mitral regurgitation.    TRICUSPID VALVE    o The tricuspid valve is normal in structure and function.  o There is trace tricuspid regurgitation.    AORTIC VALVE    o Normal tricuspid aortic valve.  o No aortic regurgitation is present.  o AV MAX PG 12.4 mmHg.    PULMONIC VALVE    o Pulmonary valve not well visualized.    GREAT VESSELS    o The aortic root is normal size.  o Normal size aorta.  o The pulmonary artery is not well visualized.    PERICARDIUM/PLEURAL EFFUSION    o There is no pericardial effusion.  o There is no pleural effusion.    _______________________________________________________________________________    Electronically signed by: Vish Lee MD 04/01/2018 01:34 PM  InterpretingPhysician: Vish Lee MD electronically signed on 2018-04-01 13:34:02.47   CT Head WO Contrast [[id number]] Collected: 03/31/2018 1950   Updated: 03/31/2018 1956   Narrative:   REASON FOR EXAM: headache, fatigue     TECHNICAL FACTORS: 5 mm contiguous axial CT images were obtained from the foramen magnum to the skull vertex.     COMPARISON: None    FINDINGS: Cortical sulci are within normal limits. The ventricular system appears unremarkable. The white-grey matter interface is preserved. No focal or generalized white or gray matter abnormality is identified. No intracranial hemorrhage is   identified.    The calvarium appears intact. Middle ear and mastoid areas are aerated. There is heterogeneous calcification of both optic nerve sheaths    Impression:   1. No intracranial abnormality or acute  finding.  2. 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        Electronically signed by Gray Conway MD on 3/31/2018 7:56 PM        Surgical Procedures during this visit:    Patient's Condition On Discharge:   Stable    Physical Exam   Constitutional: He is oriented to person, place, and time. He appears well-developed and well-nourished. No distress.   Morbidly Obese   HENT:   Head: Normocephalic and atraumatic.   Eyes: Conjunctivae and EOM are normal. Pupils are equal, round, and reactive to light. No scleral icterus.   Neck: Normal range of motion. Neck supple. No JVD present. No thyromegaly present.   Cardiovascular: Normal rate, regular rhythm and normal heart sounds. Exam reveals no friction rub.   No murmur heard.  Pulmonary/Chest: Effort normal and breath sounds normal. No respiratory distress. He has no wheezes. He has no rales.   Abdominal: Soft. Bowel sounds are normal. He exhibits no distension. There is no tenderness.   Musculoskeletal: Normal range of motion. He exhibits no edema or tenderness.   Lymphadenopathy:   He has no cervical adenopathy.   Neurological: He is alert and oriented to person, place, and time. No cranial nerve deficit. He exhibits normal muscle tone. Coordination normal.   Skin: Skin is warm. No rash noted. He is not diaphoretic. No erythema.   Psychiatric: He has a normal mood and affect. His behavior is normal. Judgment and thought content normal.   Nursing note and vitals reviewed.    Discharge Disposition:   Order for Discharge   Start Ordered   04/03/18 1206 Discharge Disposition to: Home or Self Care Once   Question: Discharge Disposition to Answer: Home or Self Care   04/03/18 1206   04/03/18 0000 Follow-up with PCP   04/03/18 1206   04/03/18 0000 Follow-up with: MOISES GRAVES; Schedule for: 1; Weeks   Question Answer Comment   with MOISES GRAVES   Schedule for 1   when Weeks     04/03/18 1206          Discharge Orders:  Follow-up Information   Juan Stevenson MD. Schedule an appointment as soon as possible for a visit in 1 week(s).   Specialty: Family Medicine  Contact information:  [address]  [place] [address]  [phone]        Soha Bruner MD Follow up in 1 week(s).   Specialty: Nephrology  Contact information:  [address] [address]  [phone]            Immunizations Administered for This Admission   No immunizations given this admission.         Medication List     CONTINUE taking these medications   acetaminophen 325 mg tablet  Commonly known as: TYLENOL  Take 2 tablets (650 mg total) by mouth every 6 (six) hours as needed for Fever.      ammonium lactate 12 % lotion  Commonly known as: LAC-HYDRIN  Apply topically 2 (two) times daily.      aspirin 325 MG tablet  Take 325 mg by mouth daily.      atorvastatin 40 MG tablet  Commonly known as: LIPITOR  Take 40 mg by mouth daily.      calcitriol 0.5 MCG capsule  Commonly known as: ROCALTROL  Take 0.5 mcg by mouth daily.      carvedilol 25 MG tablet  Quantity: 60 tablet  Commonly known as: COREG  Take 1 tablet (25 mg total) by mouth 2 (two) times daily.      cholecalciferol (vitamin D3) 1,000 unit tablet  Take 1,000 Units by mouth.      furosemide 20 MG tablet  Commonly known as: LASIX  Take 20 mg by mouth 2 (two) times daily.      hydrALAZINE 50 MG tablet  Commonly known as: APRESOLINE  Take 50 mg by mouth 2 (two) times daily.      HYDROcodone-acetaminophen 7.5-325 mg per tablet  Commonly known as: NORCO  Take 1 tablet by mouth every 6 (six) hours as needed for Pain.      IRON ORAL  Take 65 mg by mouth 2 (two) times daily.      losartan 25 MG tablet  Commonly known as: COZAAR  Take 25 mg by mouth daily.      MEN'S MULTI-VITAMIN ORAL  Take by mouth.      NIFEdipine 90 MG (OSM) 24 hr tablet  Quantity: 30 tablet  Commonly known as: PROCARDIA-XL  Take 1 tablet (90 mg total) by mouth daily.      OXYGEN-AIR DELIVERY  SYSTEMS MISC  by Miscellaneous route. Use as directed      promethazine 25 MG tablet  Commonly known as: PHENERGAN  Take 25 mg by mouth.      pyridostigmine 60 mg tablet  Commonly known as: MESTINON  Take 2 tablets 3-4 times daily      tamsulosin 0.4 mg Cp24  Quantity: 60 capsule  Commonly known as: FLOMAX  Take 2 capsules (0.8 mg total) by mouth daily.          Discharge Orders   Future Labs/Procedures Expected by Expires   Activity as tolerated As directed   Diet (Select type) Renal/60Gm/No Dialysis As directed   Questions:   Diet Type: Renal/60Gm/No Dialysis   Other Restriction(s):   Liquid Consistency:   Sodium Restriction:   Fluid restriction:   Preferences:   Follow-up with PCP As directed   Questions:   Schedule for:   when:   Follow-up with: MOISES GRAVES; Schedule for: 1; Weeks As directed   Questions:   with: MOISES GRAVES   Schedule for: 1   when: Weeks       - Greater than 35 minutes spent on discharge planning.    - Carlos Ibarra MD.            Discharge Instructions  - in this encounter      Gray Calderon RN - 04/03/2018  Increase hydralazine (Apresoline) to 3x/day. (9am, 2pm, 9pm).          The following attachments cannot be sent through Care Everywhere.    Rhabdomyolysis (English)  Hypertension Easy-to-Read (English)  Managing Your Hypertension (English)  Chronic Kidney Disease Adult Easy-to-Read (English)  Phlebitis Easy-to-Read (English)    Medications at Time of Discharge  - as of this encounter      Medication Sig. Disp. Refills Start Date End Date   acetaminophen (TYLENOL) 325 mg tablet   Take 2 tablets (650 mg total) by mouth every 6 (six) hours as needed for Fever.   0 12/12/2016     ammonium lactate (LAC-HYDRIN) 12 % lotion   Apply topically 2 (two) times daily.     12/12/2016     aspirin 325 MG tablet   Take 325 mg by mouth daily.           atorvastatin 40 MG tablet   Take 40 mg by mouth daily.           calcitriol 0.5 MCG capsule   Take 0.5 mcg by mouth daily.            carvedilol (COREG) 25 MG tablet   Take 1 tablet (25 mg total) by mouth 2 (two) times daily. 60 tablet   11 08/14/2017     cholecalciferol, vitamin D3, 1,000 unit tablet   Take 1,000 Units by mouth.           FERROUS SULFATE (IRON ORAL)   Take 65 mg by mouth 2 (two) times daily.           furosemide 20 MG tablet   Take 20 mg by mouth 2 (two) times daily.           hydrALAZINE 50 MG tablet   Take 50 mg by mouth 2 (two) times daily.           HYDROcodone-acetaminophen (NORCO) 7.5-325 mg per tablet   Take 1 tablet by mouth every 6 (six) hours as needed for Pain.           losartan 25 MG tablet   Take 25 mg by mouth daily.           MEN'S MULTI-VITAMIN ORAL   Take by mouth.           NIFEdipine (PROCARDIA-XL) 90 MG (OSM) 24 hr tablet    Indications: Essential hypertension Take 1 tablet (90 mg total) by mouth daily. 30 tablet   11 07/19/2017 07/19/2018   OXYGEN-AIR DELIVERY SYSTEMS MISC    Indications: CPAP at night at home by Miscellaneous route. Use as directed           promethazine 25 MG tablet   Take 25 mg by mouth.     08/16/2017     pyridostigmine (MESTINON) 60 mg tablet   Take 2 tablets 3-4 times daily     02/23/2016     tamsulosin (FLOMAX) 0.4 mg Cp24   Take 2 capsules (0.8 mg total) by mouth daily. 60 capsule   0 07/26/2017       Discharge Disposition  - in this encounter      Disposition Code Departure Means Destination   Home or Self Care     Home         Past Medical History:  Past Medical History:   Diagnosis Date    Acquired diverticulosis of colon     Allergy     Anemia     iron deficient,chronic, with paraprotenemia    Anticoagulant long-term use     Dyspnea on exertion     ED (erectile dysfunction) 05/15/12    Elevated liver enzymes     GERD (gastroesophageal reflux disease)     Hepatitis B core antibody positive 2012    Hyperglycemia     Hypertension     Hypogonadism male     Iron deficiency     Left ventricular diastolic dysfunction with preserved systolic function     Multiple  myeloma, stage 1 2012    Myasthenia gravis associated with thymoma     last exacerbation around early 2011, experienced weakness    Obesity     Prostatism 05/15/12    Renal insufficiency     sees Dr. Fernandez for this.     Sciatic nerve injury     Sleep apnea     He uses a CPAP for this. Room air    Spermatocele of epididymis, single 05/15/12    left sided cyst, had surgery    Ulcer 2011    peptic ulcer    Varicose veins     sometimes wears compression stockings     Past Surgical History:   Procedure Laterality Date    COLONOSCOPY  9/17/2012    was normal except for diverticulosis by Dr. Murdock     CYSTOSCOPY W/ DECANNULATION      ESOPHAGOGASTRODUODENOSCOPY  2011    He had ulcers.    JOINT REPLACEMENT      knee    KNEE SURGERY      left    SPERMATOCELECTOMY  05/15/12    Left side    THYMECTOMY  2003    for myasthenia gravis     Review of patient's allergies indicates:   Allergen Reactions    Bystolic [nebivolol] Other (See Comments)     Severe hypotension    Lisinopril      Other reaction(s): chronic cough    Lortab [hydrocodone-acetaminophen] Other (See Comments)     Weak  & dizziness     Current Outpatient Prescriptions on File Prior to Visit   Medication Sig Dispense Refill    ascorbic acid (VITAMIN C) 1000 MG tablet Take 1,000 mg by mouth once daily.      aspirin 325 MG tablet Take 325 mg by mouth 2 (two) times daily.       atorvastatin (LIPITOR) 40 MG tablet Take 1 tablet (40 mg total) by mouth once daily. 90 tablet 3    carvedilol (COREG) 25 MG tablet Take 25 mg by mouth.      ferrous sulfate 325 (65 FE) MG EC tablet Take 325 mg by mouth once daily. Not taking      furosemide (LASIX) 20 MG tablet Take 20 mg by mouth once daily.      hydrALAZINE (APRESOLINE) 50 MG tablet TAKE ONE TABLET BY MOUTH EVERY 12 HOURS 60 tablet 5    MEN'S MULTI-VITAMIN ORAL Take by mouth.      nifedipine (PROCARDIA-XL) 90 MG (OSM) 24 hr tablet Take 90 mg by mouth.      pyridostigmine (MESTINON) 60 mg Tab TAKE  TWO TABLETS BY MOUTH THREE TIMES DAILY 180 tablet 2    vitamin D 1000 units Tab Take 1,000 Units by mouth once daily. 2 tablets once daily in the morning with food       losartan (COZAAR) 100 MG tablet Take 100 mg by mouth once daily.      tamsulosin (FLOMAX) 0.4 mg Cp24 Take 0.4 mg by mouth.      [DISCONTINUED] polyethylene glycol (GOLYTELY,NULYTELY) 236-22.74-6.74 -5.86 gram suspension Drink 1 glass every 15 minutes starting at 5PM the night before the procedure until the bottle is empty. 4000 mL 0    [DISCONTINUED] promethazine (PHENERGAN) 25 MG tablet Take 1 tablet (25 mg total) by mouth every 6 (six) hours as needed for Nausea. 6 tablet 0    [DISCONTINUED] UNKNOWN TO PATIENT Blood pressure med and recently increased from 60 mg to 90 mg a day       No current facility-administered medications on file prior to visit.      Social History     Social History    Marital status:      Spouse name: N/A    Number of children: N/A    Years of education: N/A     Occupational History    Not on file.     Social History Main Topics    Smoking status: Never Smoker    Smokeless tobacco: Never Used    Alcohol use No    Drug use: No    Sexual activity: Not on file     Other Topics Concern    Not on file     Social History Narrative    No narrative on file     Family History   Problem Relation Age of Onset    Diabetes Mother     Hypertension Mother     Heart failure Mother     Diabetes Mellitus Mother     Prostate cancer Father     Hypertension Father     Lung cancer Father     Diabetes Sister     Hypertension Sister     Diabetes Mellitus Sister     Prostate cancer Paternal Uncle     Hypertension Brother     Stroke Neg Hx     Heart attack Neg Hx        Review of Systems   Constitutional: Negative for chills and fever.   Respiratory: Negative for cough, shortness of breath and wheezing.    Cardiovascular: Negative for chest pain and palpitations.   Gastrointestinal: Negative for abdominal pain.  "  Genitourinary: Negative for dysuria and hematuria.       Objective:     BP (!) 110/56   Pulse 68   Ht 6' 4" (1.93 m)   Wt (!) 216.5 kg (477 lb 6.4 oz)   BMI 58.11 kg/m²     Physical Exam   Constitutional: He appears well-developed and well-nourished.   Cardiovascular: Normal rate, regular rhythm and normal heart sounds.  Exam reveals no gallop and no friction rub.    No murmur heard.  Pulmonary/Chest: Effort normal and breath sounds normal. No respiratory distress. He has no wheezes. He has no rales.   Musculoskeletal: He exhibits no edema.     Assessment:     1. Essential hypertension    2. Non-traumatic rhabdomyolysis    3. CKD (chronic kidney disease), stage IV        Plan:     Problem List Items Addressed This Visit     Hypertension - Primary    Relevant Medications    hydrALAZINE (APRESOLINE) 50 MG tablet    Other Relevant Orders    Basic metabolic panel    Non-traumatic rhabdomyolysis    Relevant Orders    CK      Other Visit Diagnoses     CKD (chronic kidney disease), stage IV        Relevant Orders    Basic metabolic panel        WE DISCUSSED THE DASH DIET AND I GAVE A LINK FOR BP AND WEIGHT CONTROL.  No Follow-up on file.     "

## 2018-04-11 NOTE — PATIENT INSTRUCTIONS
The Dash diet has been shown to help people lower blood pressures and weight.  Copy and go to the link below to learn more about the diet and to use the helpful links from the [employer] to learn more about how to implement the diet.     https://www.[employer].com/notes/codie/dash-diet-to-control-high-blood-pressure/459893499667737/

## 2018-04-12 NOTE — PROGRESS NOTES
He needs to stop the lipitor now.  The kidney function is much worse.  He needs to call Dr. Soha Bruner today to see if she will address the creatinine which is very high now.  Please call Dr. Wahl's office and send a faxed copy of this lab to them and confirm that they receive it because the creatinine has jumped up at this time.  The cpk is still high and is minimally higher than it was but in light of his kidney issue, he should be off of the statins.

## 2018-04-25 ENCOUNTER — LAB VISIT (OUTPATIENT)
Dept: LAB | Facility: HOSPITAL | Age: 53
End: 2018-04-25
Attending: SPECIALIST
Payer: COMMERCIAL

## 2018-04-25 ENCOUNTER — OFFICE VISIT (OUTPATIENT)
Dept: FAMILY MEDICINE | Facility: CLINIC | Age: 53
End: 2018-04-25
Payer: COMMERCIAL

## 2018-04-25 VITALS
BODY MASS INDEX: 38.36 KG/M2 | HEIGHT: 76 IN | WEIGHT: 315 LBS | TEMPERATURE: 98 F | SYSTOLIC BLOOD PRESSURE: 124 MMHG | HEART RATE: 61 BPM | DIASTOLIC BLOOD PRESSURE: 66 MMHG

## 2018-04-25 DIAGNOSIS — N18.5 CKD STAGE 5 SECONDARY TO HYPERTENSION: ICD-10-CM

## 2018-04-25 DIAGNOSIS — N18.4 CHRONIC KIDNEY DISEASE, STAGE IV (SEVERE): Primary | ICD-10-CM

## 2018-04-25 DIAGNOSIS — I12.0 CKD STAGE 5 SECONDARY TO HYPERTENSION: ICD-10-CM

## 2018-04-25 DIAGNOSIS — M62.82 NON-TRAUMATIC RHABDOMYOLYSIS: Primary | ICD-10-CM

## 2018-04-25 DIAGNOSIS — R80.9 PROTEINURIA: ICD-10-CM

## 2018-04-25 LAB
ALBUMIN SERPL BCP-MCNC: 3.6 G/DL
ANION GAP SERPL CALC-SCNC: 12 MMOL/L
BACTERIA #/AREA URNS HPF: 2 /HPF
BASOPHILS # BLD AUTO: 0.03 K/UL
BASOPHILS NFR BLD: 0.4 %
BILIRUB UR QL STRIP: NEGATIVE
BUN SERPL-MCNC: 76 MG/DL
CALCIUM SERPL-MCNC: 9.4 MG/DL
CHLORIDE SERPL-SCNC: 109 MMOL/L
CLARITY UR: CLEAR
CO2 SERPL-SCNC: 20 MMOL/L
COLOR UR: YELLOW
CREAT SERPL-MCNC: 4.1 MG/DL
CREAT UR-MCNC: 153 MG/DL
DIFFERENTIAL METHOD: ABNORMAL
EOSINOPHIL # BLD AUTO: 0.4 K/UL
EOSINOPHIL NFR BLD: 4.9 %
ERYTHROCYTE [DISTWIDTH] IN BLOOD BY AUTOMATED COUNT: 13.5 %
EST. GFR  (AFRICAN AMERICAN): 17.8 ML/MIN/1.73 M^2
EST. GFR  (NON AFRICAN AMERICAN): 15.4 ML/MIN/1.73 M^2
GLUCOSE SERPL-MCNC: 131 MG/DL
GLUCOSE UR QL STRIP: NEGATIVE
HCT VFR BLD AUTO: 27.8 %
HGB BLD-MCNC: 9.1 G/DL
HGB UR QL STRIP: NEGATIVE
HYALINE CASTS #/AREA URNS LPF: 4 /LPF
IMM GRANULOCYTES # BLD AUTO: 0.02 K/UL
IMM GRANULOCYTES NFR BLD AUTO: 0.3 %
KETONES UR QL STRIP: NEGATIVE
LEUKOCYTE ESTERASE UR QL STRIP: NEGATIVE
LYMPHOCYTES # BLD AUTO: 1.9 K/UL
LYMPHOCYTES NFR BLD: 25.8 %
MCH RBC QN AUTO: 32.5 PG
MCHC RBC AUTO-ENTMCNC: 32.7 G/DL
MCV RBC AUTO: 99 FL
MICROSCOPIC COMMENT: ABNORMAL
MONOCYTES # BLD AUTO: 0.7 K/UL
MONOCYTES NFR BLD: 10.1 %
NEUTROPHILS # BLD AUTO: 4.3 K/UL
NEUTROPHILS NFR BLD: 58.5 %
NITRITE UR QL STRIP: NEGATIVE
NRBC BLD-RTO: 0 /100 WBC
PH UR STRIP: 6 [PH] (ref 5–8)
PHOSPHATE SERPL-MCNC: 3.8 MG/DL
PLATELET # BLD AUTO: 238 K/UL
PMV BLD AUTO: 11 FL
POTASSIUM SERPL-SCNC: 4.2 MMOL/L
PROT UR QL STRIP: ABNORMAL
PROT UR-MCNC: 339 MG/DL
PROT/CREAT RATIO, UR: 2.22
RBC # BLD AUTO: 2.8 M/UL
RBC #/AREA URNS HPF: 0 /HPF (ref 0–4)
SODIUM SERPL-SCNC: 141 MMOL/L
SP GR UR STRIP: 1.02 (ref 1–1.03)
SQUAMOUS #/AREA URNS HPF: 2 /HPF
URN SPEC COLLECT METH UR: ABNORMAL
WBC # BLD AUTO: 7.33 K/UL
WBC #/AREA URNS HPF: 1 /HPF (ref 0–5)

## 2018-04-25 PROCEDURE — 36415 COLL VENOUS BLD VENIPUNCTURE: CPT | Mod: PO

## 2018-04-25 PROCEDURE — 85025 COMPLETE CBC W/AUTO DIFF WBC: CPT

## 2018-04-25 PROCEDURE — 81000 URINALYSIS NONAUTO W/SCOPE: CPT | Mod: PO

## 2018-04-25 PROCEDURE — 82570 ASSAY OF URINE CREATININE: CPT

## 2018-04-25 PROCEDURE — 83540 ASSAY OF IRON: CPT

## 2018-04-25 PROCEDURE — 99999 PR PBB SHADOW E&M-EST. PATIENT-LVL III: CPT | Mod: PBBFAC,,, | Performed by: FAMILY MEDICINE

## 2018-04-25 PROCEDURE — 99495 TRANSJ CARE MGMT MOD F2F 14D: CPT | Mod: S$GLB,,, | Performed by: FAMILY MEDICINE

## 2018-04-25 PROCEDURE — 82728 ASSAY OF FERRITIN: CPT

## 2018-04-25 PROCEDURE — 80069 RENAL FUNCTION PANEL: CPT

## 2018-04-25 NOTE — PATIENT INSTRUCTIONS
The Dash diet has been shown to help people lower blood pressures and weight.  Copy and go to the link below to learn more about the diet and to use the helpful links from the [employer] to learn more about how to implement the diet.     https://www.[employer].com/notes/codie/dash-diet-to-control-high-blood-pressure/841493004692068/

## 2018-04-25 NOTE — PROGRESS NOTES
Subjective:      Patient ID: Leodan Dan is a 52 y.o. male.    Chief Complaint: Hospital Follow Up    HPI  The patient was recently hospitalized at [facility] for accelerated htn and rhabdo.  He did not get taken off of the lipitor.  He had a CPK of 900-1000.  The discharge summary from the hospitalization is copied below for reference and completeness.       Problem List Items Addressed This Visit     CKD stage 5 secondary to hypertension    Overview     He had a lab rec cently for his kidneys and he had advanced to a stage V.     BMP  Lab Results   Component Value Date     04/11/2018    K 4.7 04/11/2018     04/11/2018    CO2 21 (L) 04/11/2018    BUN 97 (H) 04/11/2018    CREATININE 5.2 (H) 04/11/2018    CALCIUM 10.1 04/11/2018    ANIONGAP 10 04/11/2018    ESTGFRAFRICA 13.4 (A) 04/11/2018    EGFRNONAA 11.6 (A) 04/11/2018     His cpk was a little over 1000.         Non-traumatic rhabdomyolysis - Primary        He is going to see Dr. Franc kaur.         Transitional Care Note     Family and/or Caretaker present at visit?  No.  Diagnostic tests reviewed/disposition: No diagnosic tests pending after this hospitalization.  Disease/illness education: he understands whe he had.  Home health/community services discussion/referrals: Patient does not have home health established from hospital visit.  They do not need home health.  If needed, we will set up home health for the patient.   Establishment or re-establishment of referral orders for community resources: No other necessary community resources.   Discussion with other health care providers: No discussion with other health care providers necessary.          Problem List Items Addressed This Visit      None          Discharge Summaries  - in this encounter    Carlos Ibarra MD - 04/03/2018 12:07 PM CDT  Formatting of this note may be different from the original.  [facility] DISCHARGE SUMMARY    Patient ID:  Leodan ROWLAND  Ni  [id number]  52 y.o.  1965    Admit Date:   3/31/2018 5:14 PM    Discharge Date:   Discharge Today: 4/3/2018    Admitting Physician:     Discharge Physician:   Carlos Ibarra MD    Consults:  Consultants   Provider Service Role Specialty   Mary Kay Larson, NP - Nurse Practitioner Nurse Practitioner   Soha Bruner MD Nephrology Consulting Physician Nephrology   Vish Lee MD Cardiology Consulting Physician Interventional Cardiology   Kathy Thornton, NP - Nurse Practitioner Nurse Practitioner Family       Reason for Admission/Admission Diagnoses:   Present on Admission:   Non-traumatic rhabdomyolysis   Venous stasis ulcer (HCC)   (Resolved) Morbid obesity (HCC)   Left ventricular diastolic dysfunction with preserved systolic function   Bilateral edema of lower extremity   (Resolved) Bradycardia   CKD stage 4 secondary to hypertension (HCC)   BARKER (dyspnea on exertion)   GERD (gastroesophageal reflux disease)   Accelerated hypertension   (Resolved) Iron deficiency anemia due to chronic blood loss   Anemia of chronic renal failure, unspecified CKD stage   Elevated lipase   Idiopathic chronic venous hypertension of right lower extremity with ulcer (HCC)   Multiple myeloma in remission (HCC)   Myasthenia gravis associated with thymoma (HCC)    Discharge Diagnoses:   Active Hospital Problems   Diagnosis Date Noted    Accelerated hypertension 05/31/2017    Morbid obesity with BMI of 50.0-59.9, adult 04/01/2018   Chronic    GERD (gastroesophageal reflux disease) 04/01/2018   Chronic    Anemia of chronic renal failure, unspecified CKD stage 04/01/2018   Chronic    Elevated lipase 04/01/2018    Glucose intolerance 04/01/2018    Non-traumatic rhabdomyolysis 03/31/2018    Bilateral edema of lower extremity 05/10/2017   Chronic    Myasthenia gravis associated with thymoma (HCC) 06/08/2016    ABDULAZIZ on CPAP   Chronic    Left ventricular diastolic dysfunction with preserved systolic  function 01/13/2016   Chronic    BARKER (dyspnea on exertion) 01/13/2016    Idiopathic chronic venous hypertension of right lower extremity with ulcer (HCC) 11/13/2015    Venous stasis ulcer (HCC) 12/13/2013   Chronic    CKD stage 4 secondary to hypertension ([facility]) 05/16/2013   Chronic    Multiple myeloma in remission ([facility]) 09/05/2012     Resolved Hospital Problems   Diagnosis Date Noted Date Resolved    Morbid obesity ([facility]) 04/01/2018    Iron deficiency anemia due to chronic blood loss 10/12/2015 04/01/2018    Bradycardia 02/25/2013 04/01/2018     History of Present Illness:     Leodan Dan is a 52 y.o. male that has a past medical history of Anemia; Arthritis; Chronic kidney disease; Encounter for blood transfusion; GERD (gastroesophageal reflux disease); Hormone disorder; MRSA infection (10/2013); Hypertension; Kidney stone; Multiple myeloma; Myasthenia gravis; Sleep apnea; and Urinary tract infection. History obtained from the patient and EMR.     Mr. Dan presents to [facility] ED ambulatory to triage with c/o elevated blood pressure over the last week associated with persistent headache, fatigue and BARKER. Denies fall/trauma or injury. Patient reports he is currently compliant with antihypertensive medications. Denies chest pain, palpitations, syncope, fever, chills, diaphoresis, nausea, vomiting. Patient reports intermittent left-sided abdominal pain. Denies dysuria or frequency. Denies hemoptysis, hematemesis, hematochezia or BRBPR. Denies specific exacerbating or alleviating factors. Denies vision changes.     In the emergency department /104,HR 70, Resp 18, Sats 96% on room air. Temp 98.3. WBC 8.3, H&H 9.8/28.4, platelet 257, glucose 178, nightly 40, creatinine 3.34, sodium 138, potassium 3.7, chloride 105, albumin 3.3, AST 49, GFR 23, lipase 70, , troponin negative, BNP 66. Urinalysis: Blood negative RBC 4, protein 300, WBC 2, hyaline casts 2. CT head: No intracranial abnormality or acute  finding. 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  EKG: Sinus bradycardia, rate 57, no acute ischemic changes.     Hospital medicine has been asked for further evaluation and treatment.        Hospital Course and Treatment:   Admission Information   Date & Time  3/31/2018 Provider  Carlos Ibarra MD Department  [facility] Dept. Phone  [phone]           -Accelerated hypertension-patient on multiple medications, obese. Increased hydralazine to 50 TID with improvement, Continue Cozaar, Procardia, Coreg Lasix orally twice a day. PRN hydralazine IV     -Nontraumatic rhabdomyolysis. Stable, No further workup. Stable in 900's for 4 days.     Anemia of chronic disease- stable H&H yesterday of 10.3/30.2,     -Sleep apnea-continue CPAP, unclear if current settings need to be titrated as an outpatient maybe causing elevated hypertension     -CKD stage IV-secondary to uncontrolled hypertension, nephrology followed. Creatinine stable at 3.0. Patient with proteinuria with 300 and urine     -Chronic venous ulcer/slow to heal ankle wound status post spider bite-continue wound care by nursing. The patient has been seeing outpatient wound care for some time and this is healing. WOC consulted. Continue oral Lasix for chronic lower extremity edema     -Morbid obesity-BMI 59.7, patient heavily counseled on weight loss     -History of multiple myeloma- in remission, stable     -History of myasthenia gravis- no evidence of flare. Continue Mestinon.       I discussed with the patient disease process and treatment.     I have personally seen and examined the patient, Leodan Dan, in a face to face encounter on the date of discharge.     He is cleared for discharge with instructions to follow up as directed.   Total time in the care and discharge planning of this patient was greater than 30 minutes.    Significant Diagnostic  Studies:  Recent Imaging and Procedure Results   Procedure Component Value Ref Range Date/Time   Echo Contrast [[id number]] Collected: 2018 0704   Updated: 2018 1334   PatientHeight 193.04 cm   PatientWeight 218.76758132 kg   Systolic Pressure 165 mmHg   Diastolic Pressure 84 mmHg   [facility]   BSA 3.57136 m^2   2D/MMode measurements and calculations: MMode 2D Measurements & Calculations   Interventricular Septum in Diastole 1.4043 cm   Left Ventricle in Diastole 5.47371 cm   Left Ventricle in Systole 4.94643 cm   LV post wall in Diastole 1.33774 cm   IVS/LVPW 1.25   FS 22.6452 %   EDV(Teich) 179.091 ml   ESV(Teich) 98.8803 ml   EF(Teich) 44.7878 %   EDV(cubed) 214.57 ml   ESV(cubed) 99.3185 ml   EF(cubed) 53.7128 %   LV mass(C)d 335.642 grams   LV mass(C)dI 104.333 grams/m^2   SV(Teich) 80.2109 ml   SI(Teich) 24.9332 ml/m^2   SV(cubed) 115.251 ml   SI(cubed) 35.8254 ml/m^2   Ao root diameter 4.07270 cm   Ao root area 15.0879 cm^2   LA Dimension 4.06660 cm   LA/Ao 1.09628   Doppler measurements and calculations: Doppler Measurements & Calculations   MV e max velocity 109.2 cm/sec   MV a velocity 86.2118 cm/sec   MV e/a ratio 1.11072   MV Dec slope 605.611 cm/sec^2   MV dec time 0.284519 sec   Ao V2 Max 175.982 cm/sec   Ao max PG 12.3879 mmHg   EF MIN = 55 %   EF MAX = 60 %   Narrative:       [facility]  [address]  [place] [id number] [phone]    Adult Echocardiogram  Name: LOUISE FERNANDEZ Study Date: 2018  MRN: [id number] Age: 52 yrs  [facility] [place] [facility] Height: 76 in  : 1965 Weight: 481 lb  Gender: Male BSA: 3.2 m2  Reason For Study: heart failure  History: HEART FAILURE  BP: 165/84 mmHg    Ordering Physician: STEFANI ROMAN  Performed By: KARLY Holman    Interpretation Summary  The study was technically difficult.  Ejection Fraction = 55-60%.  Definity contrast used to eval EF.  Left ventricular systolic function is normal.  The study was  technically difficult.    Measurements with Normals  IVSd: 1.4 cm (0.6-1.1 cm) LVIDd: 6.0 cm (3.7-5.6 cm)  LVPWd: 1.1 cm (0.6-1.1 cm) LVIDs: 4.6 cm (2.3-3.9 cm)  Ao root diam: 4.4 cm (2.0-3.7 cm)  LA dimension: 4.7 cm (1.9-4.0 cm)    MMode/2D Measurements \T\ Calculations  FS: 22.6 % Ao root area: 15.1 cm2  EDV(Teich): 179.1 ml  ESV(Teich): 98.9 ml  EF(Teich): 44.8 %    Doppler Measurements \T\ Calculations  MV E max temitope: 109.2 cm/sec MV dec slope: 605.6 cm/sec2  MV A max temitope: 86.2 cm/sec MV dec time: 0.20 sec  MV E/A: 1.3  Ao V2 max: 176.0 cm/sec  Ao max P.4 mmHg    LEFT VENTRICLE    o The left ventricle is normal in size.  o Ejection Fraction = 55-60%.  o Definity contrast used to eval EF.  o Left ventricular systolic function is normal.  o There is normal left ventricular wall thickness.    RIGHT VENTRICLE    o The right ventricle is normal in size and function.    ATRIA    o The left atrial size is normal.  o Right atrial size is normal.    MITRAL VALVE    o The mitral valve is normal in structure and function.  o There is trace to mild mitral regurgitation.    TRICUSPID VALVE    o The tricuspid valve is normal in structure and function.  o There is trace tricuspid regurgitation.    AORTIC VALVE    o Normal tricuspid aortic valve.  o No aortic regurgitation is present.  o AV MAX PG 12.4 mmHg.    PULMONIC VALVE    o Pulmonary valve not well visualized.    GREAT VESSELS    o The aortic root is normal size.  o Normal size aorta.  o The pulmonary artery is not well visualized.    PERICARDIUM/PLEURAL EFFUSION    o There is no pericardial effusion.  o There is no pleural effusion.    _______________________________________________________________________________    Electronically signed by: Vish Lee MD 2018 01:34 PM  InterpretingPhysician: Vish Lee MD electronically signed on 2018 13:34:02.47   CT Head WO Contrast [[id number]] Collected: 2018 1950   Updated: 2018 1956    Narrative:   REASON FOR EXAM: headache, fatigue     TECHNICAL FACTORS: 5 mm contiguous axial CT images were obtained from the foramen magnum to the skull vertex.     COMPARISON: None    FINDINGS: Cortical sulci are within normal limits. The ventricular system appears unremarkable. The white-grey matter interface is preserved. No focal or generalized white or gray matter abnormality is identified. No intracranial hemorrhage is   identified.    The calvarium appears intact. Middle ear and mastoid areas are aerated. There is heterogeneous calcification of both optic nerve sheaths    Impression:   1. No intracranial abnormality or acute finding.  2. 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        Electronically signed by Gray Conway MD on 3/31/2018 7:56 PM        Surgical Procedures during this visit:    Patient's Condition On Discharge:   Stable    Physical Exam   Constitutional: He is oriented to person, place, and time. He appears well-developed and well-nourished. No distress.   Morbidly Obese   HENT:   Head: Normocephalic and atraumatic.   Eyes: Conjunctivae and EOM are normal. Pupils are equal, round, and reactive to light. No scleral icterus.   Neck: Normal range of motion. Neck supple. No JVD present. No thyromegaly present.   Cardiovascular: Normal rate, regular rhythm and normal heart sounds. Exam reveals no friction rub.   No murmur heard.  Pulmonary/Chest: Effort normal and breath sounds normal. No respiratory distress. He has no wheezes. He has no rales.   Abdominal: Soft. Bowel sounds are normal. He exhibits no distension. There is no tenderness.   Musculoskeletal: Normal range of motion. He exhibits no edema or tenderness.   Lymphadenopathy:   He has no cervical adenopathy.   Neurological: He is alert and oriented to person, place, and time. No cranial nerve deficit. He exhibits normal muscle tone. Coordination normal.   Skin: Skin is  warm. No rash noted. He is not diaphoretic. No erythema.   Psychiatric: He has a normal mood and affect. His behavior is normal. Judgment and thought content normal.   Nursing note and vitals reviewed.    Discharge Disposition:   Order for Discharge   Start Ordered   04/03/18 1206 Discharge Disposition to: Home or Self Care Once   Question: Discharge Disposition to Answer: Home or Self Care   04/03/18 1206   04/03/18 0000 Follow-up with PCP   04/03/18 1206   04/03/18 0000 Follow-up with: SOHA GRAVES; Schedule for: 1; Weeks   Question Answer Comment   with SOHA GRAVES   Schedule for 1   when Weeks     04/03/18 1206         Discharge Orders:  Follow-up Information   Juan Stevenson MD. Schedule an appointment as soon as possible for a visit in 1 week(s).   Specialty: Family Medicine  Contact information:  [address]  [place] [address]  [phone]        Soha Graves MD Follow up in 1 week(s).   Specialty: Nephrology  Contact information:  [address] [facility] [address]  [phone]            Immunizations Administered for This Admission   No immunizations given this admission.         Medication List     CONTINUE taking these medications   acetaminophen 325 mg tablet  Commonly known as: TYLENOL  Take 2 tablets (650 mg total) by mouth every 6 (six) hours as needed for Fever.      ammonium lactate 12 % lotion  Commonly known as: LAC-HYDRIN  Apply topically 2 (two) times daily.      aspirin 325 MG tablet  Take 325 mg by mouth daily.      atorvastatin 40 MG tablet  Commonly known as: LIPITOR  Take 40 mg by mouth daily.      calcitriol 0.5 MCG capsule  Commonly known as: ROCALTROL  Take 0.5 mcg by mouth daily.      carvedilol 25 MG tablet  Quantity: 60 tablet  Commonly known as: COREG  Take 1 tablet (25 mg total) by mouth 2 (two) times daily.      cholecalciferol (vitamin D3) 1,000 unit tablet  Take 1,000 Units by mouth.      furosemide 20 MG tablet  Commonly known as:  LASIX  Take 20 mg by mouth 2 (two) times daily.      hydrALAZINE 50 MG tablet  Commonly known as: APRESOLINE  Take 50 mg by mouth 2 (two) times daily.      HYDROcodone-acetaminophen 7.5-325 mg per tablet  Commonly known as: NORCO  Take 1 tablet by mouth every 6 (six) hours as needed for Pain.      IRON ORAL  Take 65 mg by mouth 2 (two) times daily.      losartan 25 MG tablet  Commonly known as: COZAAR  Take 25 mg by mouth daily.      MEN'S MULTI-VITAMIN ORAL  Take by mouth.      NIFEdipine 90 MG (OSM) 24 hr tablet  Quantity: 30 tablet  Commonly known as: PROCARDIA-XL  Take 1 tablet (90 mg total) by mouth daily.      OXYGEN-AIR DELIVERY SYSTEMS MISC  by Miscellaneous route. Use as directed      promethazine 25 MG tablet  Commonly known as: PHENERGAN  Take 25 mg by mouth.      pyridostigmine 60 mg tablet  Commonly known as: MESTINON  Take 2 tablets 3-4 times daily      tamsulosin 0.4 mg Cp24  Quantity: 60 capsule  Commonly known as: FLOMAX  Take 2 capsules (0.8 mg total) by mouth daily.          Discharge Orders   Future Labs/Procedures Expected by Expires   Activity as tolerated As directed   Diet (Select type) Renal/60Gm/No Dialysis As directed   Questions:   Diet Type: Renal/60Gm/No Dialysis   Other Restriction(s):   Liquid Consistency:   Sodium Restriction:   Fluid restriction:   Preferences:   Follow-up with PCP As directed   Questions:   Schedule for:   when:   Follow-up with: MOISES GRAVES; Schedule for: 1; Weeks As directed   Questions:   with: MOISES GRAVES   Schedule for: 1   when: Weeks       - Greater than 35 minutes spent on discharge planning.    - Carlos Ibarra MD.              Discharge Instructions  - in this encounter       Gray Calderon RN - 04/03/2018  Increase hydralazine (Apresoline) to 3x/day. (9am, 2pm, 9pm).           The following attachments cannot be sent through Care Everywhere.    Rhabdomyolysis (English)  Hypertension Easy-to-Read (English)  Managing Your Hypertension  (English)  Chronic Kidney Disease Adult Easy-to-Read (English)  Phlebitis Easy-to-Read (English)     Medications at Time of Discharge  - as of this encounter       Medication Sig. Disp. Refills Start Date End Date   acetaminophen (TYLENOL) 325 mg tablet   Take 2 tablets (650 mg total) by mouth every 6 (six) hours as needed for Fever.   0 12/12/2016     ammonium lactate (LAC-HYDRIN) 12 % lotion   Apply topically 2 (two) times daily.     12/12/2016     aspirin 325 MG tablet   Take 325 mg by mouth daily.           atorvastatin 40 MG tablet   Take 40 mg by mouth daily.           calcitriol 0.5 MCG capsule   Take 0.5 mcg by mouth daily.           carvedilol (COREG) 25 MG tablet   Take 1 tablet (25 mg total) by mouth 2 (two) times daily. 60 tablet   11 08/14/2017     cholecalciferol, vitamin D3, 1,000 unit tablet   Take 1,000 Units by mouth.           FERROUS SULFATE (IRON ORAL)   Take 65 mg by mouth 2 (two) times daily.           furosemide 20 MG tablet   Take 20 mg by mouth 2 (two) times daily.           hydrALAZINE 50 MG tablet   Take 50 mg by mouth 2 (two) times daily.           HYDROcodone-acetaminophen (NORCO) 7.5-325 mg per tablet   Take 1 tablet by mouth every 6 (six) hours as needed for Pain.           losartan 25 MG tablet   Take 25 mg by mouth daily.           MEN'S MULTI-VITAMIN ORAL   Take by mouth.           NIFEdipine (PROCARDIA-XL) 90 MG (OSM) 24 hr tablet    Indications: Essential hypertension Take 1 tablet (90 mg total) by mouth daily. 30 tablet   11 07/19/2017 07/19/2018   OXYGEN-AIR DELIVERY SYSTEMS MIS    Indications: CPAP at night at home by Miscellaneous route. Use as directed           promethazine 25 MG tablet   Take 25 mg by mouth.     08/16/2017     pyridostigmine (MESTINON) 60 mg tablet   Take 2 tablets 3-4 times daily     02/23/2016     tamsulosin (FLOMAX) 0.4 mg Cp24   Take 2 capsules (0.8 mg total) by mouth daily. 60 capsule   0 07/26/2017        Discharge Disposition  - in this  "encounter       Disposition Code Departure Means Destination   Home or Self Care     Home              Review of Systems   Constitutional: Positive for fatigue. Negative for fever.   Respiratory: Positive for shortness of breath. Negative for cough and wheezing.    Cardiovascular: Positive for leg swelling. Negative for chest pain and palpitations.   Gastrointestinal: Negative for abdominal pain.   Genitourinary: Negative for dysuria and hematuria.       Objective:   /66   Pulse 61   Temp 98.3 °F (36.8 °C) (Oral)   Ht 6' 4" (1.93 m)   Wt (!) 219 kg (482 lb 14.7 oz)   BMI 58.78 kg/m²     Physical Exam   Constitutional: He appears well-developed and well-nourished.   Cardiovascular: Normal rate, regular rhythm and normal heart sounds.  Exam reveals no gallop and no friction rub.    No murmur heard.  Pulmonary/Chest: Effort normal and breath sounds normal. No respiratory distress. He has no wheezes. He has no rales.   Musculoskeletal: He exhibits no edema.       Assessment:     1. Non-traumatic rhabdomyolysis    2. CKD stage 5 secondary to hypertension        Plan:   Leodan was seen today for hospital follow up.    Diagnoses and all orders for this visit:    Non-traumatic rhabdomyolysis  -     CK; Future    CKD stage 5 secondary to hypertension  -     CK; Future    continue follow up with the nephrologist.      "

## 2018-04-26 LAB
FERRITIN SERPL-MCNC: 1504 NG/ML
IRON SERPL-MCNC: 56 UG/DL
SATURATED IRON: 22 %
TOTAL IRON BINDING CAPACITY: 259 UG/DL
TRANSFERRIN SERPL-MCNC: 175 MG/DL

## 2018-05-01 ENCOUNTER — LAB VISIT (OUTPATIENT)
Dept: LAB | Facility: HOSPITAL | Age: 53
End: 2018-05-01
Attending: SPECIALIST
Payer: COMMERCIAL

## 2018-05-01 DIAGNOSIS — N18.4 CHRONIC KIDNEY DISEASE, STAGE IV (SEVERE): Primary | ICD-10-CM

## 2018-05-01 DIAGNOSIS — R80.9 PROTEINURIA: ICD-10-CM

## 2018-05-01 DIAGNOSIS — E66.01 MORBID OBESITY: ICD-10-CM

## 2018-05-01 DIAGNOSIS — D64.9 ANEMIA, UNSPECIFIED: ICD-10-CM

## 2018-05-01 LAB
ALBUMIN SERPL BCP-MCNC: 3.7 G/DL
ANION GAP SERPL CALC-SCNC: 12 MMOL/L
BUN SERPL-MCNC: 63 MG/DL
CALCIUM SERPL-MCNC: 9.7 MG/DL
CHLORIDE SERPL-SCNC: 107 MMOL/L
CK SERPL-CCNC: 740 U/L
CO2 SERPL-SCNC: 21 MMOL/L
CREAT SERPL-MCNC: 4 MG/DL
EST. GFR  (AFRICAN AMERICAN): 18.4 ML/MIN/1.73 M^2
EST. GFR  (NON AFRICAN AMERICAN): 15.9 ML/MIN/1.73 M^2
GLUCOSE SERPL-MCNC: 90 MG/DL
PHOSPHATE SERPL-MCNC: 4.4 MG/DL
POTASSIUM SERPL-SCNC: 4.4 MMOL/L
SODIUM SERPL-SCNC: 140 MMOL/L

## 2018-05-01 PROCEDURE — 82550 ASSAY OF CK (CPK): CPT

## 2018-05-01 PROCEDURE — 36415 COLL VENOUS BLD VENIPUNCTURE: CPT | Mod: PO

## 2018-05-01 PROCEDURE — 80069 RENAL FUNCTION PANEL: CPT

## 2018-05-07 ENCOUNTER — LAB VISIT (OUTPATIENT)
Dept: LAB | Facility: HOSPITAL | Age: 53
End: 2018-05-07
Attending: NURSE PRACTITIONER
Payer: COMMERCIAL

## 2018-05-07 DIAGNOSIS — L84 CORNS AND CALLOSITIES: ICD-10-CM

## 2018-05-07 DIAGNOSIS — D64.9 ANEMIA, UNSPECIFIED: ICD-10-CM

## 2018-05-07 DIAGNOSIS — I87.2 PERIPHERAL VENOUS INSUFFICIENCY: ICD-10-CM

## 2018-05-07 DIAGNOSIS — L97.312 NON-PRESSURE CHRONIC ULCER OF RIGHT ANKLE WITH FAT LAYER EXPOSED: ICD-10-CM

## 2018-05-07 DIAGNOSIS — M79.604 RIGHT LEG PAIN: ICD-10-CM

## 2018-05-07 DIAGNOSIS — R80.9 PROTEINURIA: ICD-10-CM

## 2018-05-07 DIAGNOSIS — I87.311 IDIOPATHIC CHRONIC VENOUS HYPERTENSION OF RIGHT LOWER EXTREMITY WITH ULCER: ICD-10-CM

## 2018-05-07 DIAGNOSIS — S81.801A WOUND OF RIGHT LEG: ICD-10-CM

## 2018-05-07 DIAGNOSIS — L97.919 IDIOPATHIC CHRONIC VENOUS HYPERTENSION OF RIGHT LOWER EXTREMITY WITH ULCER: ICD-10-CM

## 2018-05-07 DIAGNOSIS — M14.671 CHARCOT'S JOINT OF ANKLE, RIGHT: ICD-10-CM

## 2018-05-07 DIAGNOSIS — L88 PYODERMA GANGRENOSUM: ICD-10-CM

## 2018-05-07 DIAGNOSIS — N18.4 CHRONIC KIDNEY DISEASE, STAGE IV (SEVERE): Primary | ICD-10-CM

## 2018-05-07 DIAGNOSIS — R60.0 LOCALIZED EDEMA: ICD-10-CM

## 2018-05-07 DIAGNOSIS — L60.2 HYPERTROPHY OF NAIL: ICD-10-CM

## 2018-05-07 LAB
ALBUMIN SERPL BCP-MCNC: 3.6 G/DL
ALP SERPL-CCNC: 60 U/L
ALT SERPL W/O P-5'-P-CCNC: 24 U/L
ANION GAP SERPL CALC-SCNC: 11 MMOL/L
AST SERPL-CCNC: 40 U/L
BASOPHILS # BLD AUTO: 0.05 K/UL
BASOPHILS NFR BLD: 0.6 %
BILIRUB SERPL-MCNC: 0.4 MG/DL
BUN SERPL-MCNC: 63 MG/DL
CALCIUM SERPL-MCNC: 9.9 MG/DL
CHLORIDE SERPL-SCNC: 109 MMOL/L
CO2 SERPL-SCNC: 21 MMOL/L
CREAT SERPL-MCNC: 4 MG/DL
CRP SERPL-MCNC: 23.3 MG/L
DIFFERENTIAL METHOD: ABNORMAL
EOSINOPHIL # BLD AUTO: 0.3 K/UL
EOSINOPHIL NFR BLD: 3 %
ERYTHROCYTE [DISTWIDTH] IN BLOOD BY AUTOMATED COUNT: 13.1 %
ERYTHROCYTE [SEDIMENTATION RATE] IN BLOOD BY WESTERGREN METHOD: 83 MM/HR
EST. GFR  (AFRICAN AMERICAN): 18.4 ML/MIN/1.73 M^2
EST. GFR  (NON AFRICAN AMERICAN): 15.9 ML/MIN/1.73 M^2
GLUCOSE SERPL-MCNC: 89 MG/DL
HCT VFR BLD AUTO: 28 %
HGB BLD-MCNC: 9.1 G/DL
IMM GRANULOCYTES # BLD AUTO: 0.03 K/UL
IMM GRANULOCYTES NFR BLD AUTO: 0.3 %
LYMPHOCYTES # BLD AUTO: 2.3 K/UL
LYMPHOCYTES NFR BLD: 26.9 %
MCH RBC QN AUTO: 32.3 PG
MCHC RBC AUTO-ENTMCNC: 32.5 G/DL
MCV RBC AUTO: 99 FL
MONOCYTES # BLD AUTO: 1 K/UL
MONOCYTES NFR BLD: 11.5 %
NEUTROPHILS # BLD AUTO: 5 K/UL
NEUTROPHILS NFR BLD: 57.7 %
NRBC BLD-RTO: 0 /100 WBC
PLATELET # BLD AUTO: 281 K/UL
PMV BLD AUTO: 10.7 FL
POTASSIUM SERPL-SCNC: 4.4 MMOL/L
PROT SERPL-MCNC: 7.5 G/DL
RBC # BLD AUTO: 2.82 M/UL
SODIUM SERPL-SCNC: 141 MMOL/L
WBC # BLD AUTO: 8.69 K/UL

## 2018-05-07 PROCEDURE — 86140 C-REACTIVE PROTEIN: CPT

## 2018-05-07 PROCEDURE — 84134 ASSAY OF PREALBUMIN: CPT

## 2018-05-07 PROCEDURE — 85651 RBC SED RATE NONAUTOMATED: CPT

## 2018-05-07 PROCEDURE — 36415 COLL VENOUS BLD VENIPUNCTURE: CPT | Mod: PO

## 2018-05-07 PROCEDURE — 83874 ASSAY OF MYOGLOBIN: CPT

## 2018-05-07 PROCEDURE — 80053 COMPREHEN METABOLIC PANEL: CPT

## 2018-05-07 PROCEDURE — 85025 COMPLETE CBC W/AUTO DIFF WBC: CPT

## 2018-05-08 LAB — PREALB SERPL-MCNC: 41 MG/DL

## 2018-05-09 LAB — MYOGLOBIN 24H UR-MRATE: 37 MCG/L

## 2018-05-31 ENCOUNTER — LAB VISIT (OUTPATIENT)
Dept: LAB | Facility: HOSPITAL | Age: 53
End: 2018-05-31
Attending: SPECIALIST
Payer: COMMERCIAL

## 2018-05-31 DIAGNOSIS — R80.9 PROTEINURIA: ICD-10-CM

## 2018-05-31 DIAGNOSIS — N25.81 SECONDARY HYPERPARATHYROIDISM OF RENAL ORIGIN: ICD-10-CM

## 2018-05-31 DIAGNOSIS — N18.4 CHRONIC KIDNEY DISEASE, STAGE IV (SEVERE): Primary | ICD-10-CM

## 2018-05-31 DIAGNOSIS — D64.9 ANEMIA, UNSPECIFIED: ICD-10-CM

## 2018-05-31 LAB
ALBUMIN SERPL BCP-MCNC: 3.6 G/DL
ANION GAP SERPL CALC-SCNC: 11 MMOL/L
BACTERIA #/AREA URNS HPF: NORMAL /HPF
BASOPHILS # BLD AUTO: 0.03 K/UL
BASOPHILS NFR BLD: 0.4 %
BILIRUB UR QL STRIP: NEGATIVE
BUN SERPL-MCNC: 46 MG/DL
CALCIUM SERPL-MCNC: 9.7 MG/DL
CHLORIDE SERPL-SCNC: 107 MMOL/L
CK SERPL-CCNC: 812 U/L
CLARITY UR: CLEAR
CO2 SERPL-SCNC: 22 MMOL/L
COLOR UR: YELLOW
CREAT SERPL-MCNC: 3.7 MG/DL
CREAT UR-MCNC: 178 MG/DL
CRP SERPL-MCNC: 19.6 MG/L
DIFFERENTIAL METHOD: ABNORMAL
EOSINOPHIL # BLD AUTO: 0.3 K/UL
EOSINOPHIL NFR BLD: 3.7 %
ERYTHROCYTE [DISTWIDTH] IN BLOOD BY AUTOMATED COUNT: 13.4 %
ERYTHROCYTE [SEDIMENTATION RATE] IN BLOOD BY WESTERGREN METHOD: 67 MM/HR
EST. GFR  (AFRICAN AMERICAN): 20.2 ML/MIN/1.73 M^2
EST. GFR  (NON AFRICAN AMERICAN): 17.5 ML/MIN/1.73 M^2
GLUCOSE SERPL-MCNC: 110 MG/DL
GLUCOSE UR QL STRIP: NEGATIVE
HCT VFR BLD AUTO: 28.6 %
HGB BLD-MCNC: 9 G/DL
HGB UR QL STRIP: NEGATIVE
HYALINE CASTS #/AREA URNS LPF: 0 /LPF
IMM GRANULOCYTES # BLD AUTO: 0.03 K/UL
IMM GRANULOCYTES NFR BLD AUTO: 0.4 %
KETONES UR QL STRIP: NEGATIVE
LEUKOCYTE ESTERASE UR QL STRIP: NEGATIVE
LYMPHOCYTES # BLD AUTO: 2.1 K/UL
LYMPHOCYTES NFR BLD: 24.8 %
MCH RBC QN AUTO: 32 PG
MCHC RBC AUTO-ENTMCNC: 31.5 G/DL
MCV RBC AUTO: 102 FL
MICROSCOPIC COMMENT: NORMAL
MONOCYTES # BLD AUTO: 0.9 K/UL
MONOCYTES NFR BLD: 10.4 %
NEUTROPHILS # BLD AUTO: 5 K/UL
NEUTROPHILS NFR BLD: 60.3 %
NITRITE UR QL STRIP: NEGATIVE
NRBC BLD-RTO: 0 /100 WBC
PH UR STRIP: 6 [PH] (ref 5–8)
PHOSPHATE SERPL-MCNC: 3.9 MG/DL
PLATELET # BLD AUTO: 273 K/UL
PMV BLD AUTO: 10.4 FL
POTASSIUM SERPL-SCNC: 4.1 MMOL/L
PROT UR QL STRIP: ABNORMAL
PROT UR-MCNC: 459 MG/DL
PROT/CREAT RATIO, UR: 2.58
PTH-INTACT SERPL-MCNC: 270 PG/ML
RBC # BLD AUTO: 2.81 M/UL
RBC #/AREA URNS HPF: 1 /HPF (ref 0–4)
SODIUM SERPL-SCNC: 140 MMOL/L
SP GR UR STRIP: 1.02 (ref 1–1.03)
SQUAMOUS #/AREA URNS HPF: NORMAL /HPF
URN SPEC COLLECT METH UR: ABNORMAL
WBC # BLD AUTO: 8.35 K/UL
WBC #/AREA URNS HPF: 2 /HPF (ref 0–5)

## 2018-05-31 PROCEDURE — 85025 COMPLETE CBC W/AUTO DIFF WBC: CPT

## 2018-05-31 PROCEDURE — 81000 URINALYSIS NONAUTO W/SCOPE: CPT | Mod: PO

## 2018-05-31 PROCEDURE — 80069 RENAL FUNCTION PANEL: CPT

## 2018-05-31 PROCEDURE — 82550 ASSAY OF CK (CPK): CPT

## 2018-05-31 PROCEDURE — 85651 RBC SED RATE NONAUTOMATED: CPT | Mod: PO

## 2018-05-31 PROCEDURE — 86140 C-REACTIVE PROTEIN: CPT

## 2018-05-31 PROCEDURE — 84156 ASSAY OF PROTEIN URINE: CPT

## 2018-05-31 PROCEDURE — 83970 ASSAY OF PARATHORMONE: CPT

## 2018-05-31 PROCEDURE — 87086 URINE CULTURE/COLONY COUNT: CPT

## 2018-05-31 PROCEDURE — 36415 COLL VENOUS BLD VENIPUNCTURE: CPT | Mod: PO

## 2018-06-01 LAB — BACTERIA UR CULT: NO GROWTH

## 2018-06-02 RX ORDER — PYRIDOSTIGMINE BROMIDE 60 MG/1
TABLET ORAL
Qty: 180 TABLET | Refills: 2 | Status: SHIPPED | OUTPATIENT
Start: 2018-06-02 | End: 2018-09-15 | Stop reason: SDUPTHER

## 2018-06-08 ENCOUNTER — TELEPHONE (OUTPATIENT)
Dept: HEMATOLOGY/ONCOLOGY | Facility: CLINIC | Age: 53
End: 2018-06-08

## 2018-06-08 ENCOUNTER — LAB VISIT (OUTPATIENT)
Dept: LAB | Facility: HOSPITAL | Age: 53
End: 2018-06-08
Attending: FAMILY MEDICINE
Payer: COMMERCIAL

## 2018-06-08 DIAGNOSIS — L97.312 NON-PRESSURE CHRONIC ULCER OF RIGHT ANKLE WITH FAT LAYER EXPOSED: Primary | ICD-10-CM

## 2018-06-08 DIAGNOSIS — C90.01 MULTIPLE MYELOMA IN REMISSION: ICD-10-CM

## 2018-06-08 LAB
ALBUMIN SERPL BCP-MCNC: 3.6 G/DL
ALP SERPL-CCNC: 65 U/L
ALT SERPL W/O P-5'-P-CCNC: 26 U/L
ANION GAP SERPL CALC-SCNC: 11 MMOL/L
AST SERPL-CCNC: 48 U/L
BASOPHILS # BLD AUTO: 0.03 K/UL
BASOPHILS NFR BLD: 0.4 %
BILIRUB SERPL-MCNC: 0.5 MG/DL
BUN SERPL-MCNC: 44 MG/DL
CALCIUM SERPL-MCNC: 9.8 MG/DL
CHLORIDE SERPL-SCNC: 107 MMOL/L
CO2 SERPL-SCNC: 22 MMOL/L
CREAT SERPL-MCNC: 3.7 MG/DL
CRP SERPL-MCNC: 23.7 MG/L
DIFFERENTIAL METHOD: ABNORMAL
EOSINOPHIL # BLD AUTO: 0.2 K/UL
EOSINOPHIL NFR BLD: 2.8 %
ERYTHROCYTE [DISTWIDTH] IN BLOOD BY AUTOMATED COUNT: 13.6 %
ERYTHROCYTE [SEDIMENTATION RATE] IN BLOOD BY WESTERGREN METHOD: 65 MM/HR
EST. GFR  (AFRICAN AMERICAN): 20.2 ML/MIN/1.73 M^2
EST. GFR  (NON AFRICAN AMERICAN): 17.5 ML/MIN/1.73 M^2
ESTIMATED AVG GLUCOSE: 103 MG/DL
GLUCOSE SERPL-MCNC: 98 MG/DL
HBA1C MFR BLD HPLC: 5.2 %
HCT VFR BLD AUTO: 27.8 %
HGB BLD-MCNC: 9 G/DL
LDH SERPL L TO P-CCNC: 179 U/L
LYMPHOCYTES # BLD AUTO: 2 K/UL
LYMPHOCYTES NFR BLD: 25.6 %
MAGNESIUM SERPL-MCNC: 2.1 MG/DL
MCH RBC QN AUTO: 31.8 PG
MCHC RBC AUTO-ENTMCNC: 32.4 G/DL
MCV RBC AUTO: 98 FL
MONOCYTES # BLD AUTO: 0.9 K/UL
MONOCYTES NFR BLD: 11.2 %
NEUTROPHILS # BLD AUTO: 4.6 K/UL
NEUTROPHILS NFR BLD: 60 %
PLATELET # BLD AUTO: 258 K/UL
PMV BLD AUTO: 10.6 FL
POTASSIUM SERPL-SCNC: 4 MMOL/L
PROT SERPL-MCNC: 7.5 G/DL
RBC # BLD AUTO: 2.83 M/UL
SODIUM SERPL-SCNC: 140 MMOL/L
WBC # BLD AUTO: 7.74 K/UL

## 2018-06-08 PROCEDURE — 86334 IMMUNOFIX E-PHORESIS SERUM: CPT | Mod: 26,,, | Performed by: PATHOLOGY

## 2018-06-08 PROCEDURE — 84165 PROTEIN E-PHORESIS SERUM: CPT | Mod: 26,,, | Performed by: PATHOLOGY

## 2018-06-08 PROCEDURE — 83036 HEMOGLOBIN GLYCOSYLATED A1C: CPT

## 2018-06-08 PROCEDURE — 36415 COLL VENOUS BLD VENIPUNCTURE: CPT | Mod: PO

## 2018-06-08 PROCEDURE — 85651 RBC SED RATE NONAUTOMATED: CPT | Mod: PO

## 2018-06-08 PROCEDURE — 83520 IMMUNOASSAY QUANT NOS NONAB: CPT | Mod: 59

## 2018-06-08 PROCEDURE — 84165 PROTEIN E-PHORESIS SERUM: CPT

## 2018-06-08 PROCEDURE — 86140 C-REACTIVE PROTEIN: CPT

## 2018-06-08 PROCEDURE — 83615 LACTATE (LD) (LDH) ENZYME: CPT

## 2018-06-08 PROCEDURE — 85025 COMPLETE CBC W/AUTO DIFF WBC: CPT | Mod: PO

## 2018-06-08 PROCEDURE — 86334 IMMUNOFIX E-PHORESIS SERUM: CPT

## 2018-06-08 PROCEDURE — 82232 ASSAY OF BETA-2 PROTEIN: CPT

## 2018-06-08 PROCEDURE — 84134 ASSAY OF PREALBUMIN: CPT

## 2018-06-08 PROCEDURE — 83735 ASSAY OF MAGNESIUM: CPT

## 2018-06-08 PROCEDURE — 80053 COMPREHEN METABOLIC PANEL: CPT

## 2018-06-08 NOTE — TELEPHONE ENCOUNTER
Called to r/s appt on 6/15, no answer left voicemail. Let pt know on message that the labs he had done today could take up to a week to result that is why his appt is not until 6/15/18.    Did tell pt to call back if he still needs to change 6/15/18 appt.

## 2018-06-08 NOTE — TELEPHONE ENCOUNTER
----- Message from Heather Hawkins sent at 6/8/2018  1:29 PM CDT -----  Contact: Patient  Patient is unable to make his appointment on 6/15 and is trying to see if he can come in earlier next week and he had his blood work done today.  Please call to reschedule.  Call Back#[phone]  Thanks

## 2018-06-11 LAB
ALBUMIN SERPL ELPH-MCNC: 3.75 G/DL
ALPHA1 GLOB SERPL ELPH-MCNC: 0.34 G/DL
ALPHA2 GLOB SERPL ELPH-MCNC: 0.97 G/DL
B-GLOBULIN SERPL ELPH-MCNC: 0.88 G/DL
B2 MICROGLOB SERPL-MCNC: 6.9 UG/ML
GAMMA GLOB SERPL ELPH-MCNC: 1.06 G/DL
INTERPRETATION SERPL IFE-IMP: NORMAL
KAPPA LC SER QL IA: 5.08 MG/DL
KAPPA LC/LAMBDA SER IA: 1.33
LAMBDA LC SER QL IA: 3.82 MG/DL
PATHOLOGIST INTERPRETATION IFE: NORMAL
PATHOLOGIST INTERPRETATION SPE: NORMAL
PREALB SERPL-MCNC: 38 MG/DL
PROT SERPL-MCNC: 7 G/DL

## 2018-06-13 ENCOUNTER — TELEPHONE (OUTPATIENT)
Dept: HEMATOLOGY/ONCOLOGY | Facility: CLINIC | Age: 53
End: 2018-06-13

## 2018-06-13 NOTE — TELEPHONE ENCOUNTER
Spoke with Pt. Pt called to r/s his appt on Friday due to him going out of town. Pt r/s to 6/19/18 @ 1040  with Dr Iqbal. Pt stated his labs were done. Pt verbalized understanding.

## 2018-06-13 NOTE — TELEPHONE ENCOUNTER
----- Message from Sravani Peters sent at 6/13/2018 10:28 AM CDT -----  Type:  Patient Returning Call    Who Called:  Patient  Who Left Message for Patient:  NA  Does the patient know what this is regarding?:  Rescheduling appointment  Best Call Back Number: [phone]  Additional Information: Patient stated he received a call requesting to reschedule appointment.

## 2018-06-19 ENCOUNTER — OFFICE VISIT (OUTPATIENT)
Dept: HEMATOLOGY/ONCOLOGY | Facility: CLINIC | Age: 53
End: 2018-06-19
Payer: COMMERCIAL

## 2018-06-19 VITALS
RESPIRATION RATE: 18 BRPM | TEMPERATURE: 99 F | DIASTOLIC BLOOD PRESSURE: 65 MMHG | SYSTOLIC BLOOD PRESSURE: 124 MMHG | WEIGHT: 315 LBS | BODY MASS INDEX: 37.19 KG/M2 | HEART RATE: 57 BPM | HEIGHT: 77 IN

## 2018-06-19 DIAGNOSIS — E66.01 MORBID OBESITY: ICD-10-CM

## 2018-06-19 DIAGNOSIS — N18.4 ANEMIA IN STAGE 4 CHRONIC KIDNEY DISEASE: ICD-10-CM

## 2018-06-19 DIAGNOSIS — D63.1 ANEMIA IN STAGE 4 CHRONIC KIDNEY DISEASE: ICD-10-CM

## 2018-06-19 DIAGNOSIS — C90.01 MULTIPLE MYELOMA IN REMISSION: Primary | ICD-10-CM

## 2018-06-19 DIAGNOSIS — T81.89XS NONHEALING SURGICAL WOUND, SEQUELA: ICD-10-CM

## 2018-06-19 PROCEDURE — 3074F SYST BP LT 130 MM HG: CPT | Mod: CPTII,S$GLB,, | Performed by: INTERNAL MEDICINE

## 2018-06-19 PROCEDURE — 99213 OFFICE O/P EST LOW 20 MIN: CPT | Mod: S$GLB,,, | Performed by: INTERNAL MEDICINE

## 2018-06-19 PROCEDURE — 3008F BODY MASS INDEX DOCD: CPT | Mod: CPTII,S$GLB,, | Performed by: INTERNAL MEDICINE

## 2018-06-19 PROCEDURE — 99999 PR PBB SHADOW E&M-EST. PATIENT-LVL III: CPT | Mod: PBBFAC,,, | Performed by: INTERNAL MEDICINE

## 2018-06-19 PROCEDURE — 3078F DIAST BP <80 MM HG: CPT | Mod: CPTII,S$GLB,, | Performed by: INTERNAL MEDICINE

## 2018-06-19 NOTE — PROGRESS NOTES
HISTORY OF PRESENT ILLNESS:  A 52-year-old white gentleman well known to me for   multiple myeloma, which went into remission following induction therapy with ZRD   and a protracted course of maintenance Revlimid as the patient's paraprotein   levels were undetectable for long period of time.  He was taken off all therapy   and observed.  He has remained without evidence of relapse.  His course has been   complicated by nonhealing lower extremity wounds and underlying CKD.  He   returns for interval followup.  No new complaints or pertinent findings on a   10-point review of system.    PHYSICAL EXAMINATION:  GENERAL:  Well-developed, morbidly obese, white gentleman in no acute distress,   who has a weight of 476-1/2 pounds (decreased by 13 pounds).  VITAL SIGNS:  Documented in EMR and reviewed.  HEENT:  Normocephalic, atraumatic.  Oral mucosa pink and moist.  Lips without   lesions.  Tongue midline.  Oropharynx clear.  Nonicteric sclerae.   NECK:  Supple.  No adenopathy.  HEART:  Regular rate and rhythm without murmur, gallop or rub.               LUNGS:  Clear to auscultation bilaterally.                                   ABDOMEN:  Soft, nontender and nondistended with positive normoactive bowel   sounds.  No hepatosplenomegaly.  EXTREMITIES:  Both lower extremities are wrapped and the patient has a wound VAC   on the right.    LABORATORY:  White count 7.7, H and H 9.0 and 27.8, platelet count of 258.    Chemistry:  Sodium 140, potassium 4, chloride 107, CO2 22, BUN 44, creatinine   3.7, glucose 98, calcium 9.8, AST 48.  The remainder of the liver function tests   are within normal limits.  GFR 20.  SPEP is negative for monoclonal   paraprotein.  Free light chain analysis has a normal ratio.  Beta-2   microglobulin is elevated at 6.9.    IMPRESSION:  1.  Multiple myeloma -- remains without evidence of relapse.  2.  Morbid obesity.  3.  Chronic nonhealing lower extremity wounds.  4.  Anemia in the setting of stage  IV chronic kidney disease.    PLAN:  1.  In regard to myeloma, we will continue observation and have the patient   return to clinic in six months from now with interval CBC, CMP, LDH, SPEP with   immunofixation, beta-2 microglobulin and free light chain analysis prior to   appointment.  2.  Continue efforts at weight loss.  3.  Follow up with Dr. Soha Burner in regard to CKD.  4.  Should the patient develop symptomatic anemia, consideration could be given   to therapy with Procrit.      YAYA/HN  dd: 06/19/2018 10:35:37 (CDT)  td: 06/20/2018 04:24:08 (CDT)  Doc ID   #[id number]  Job ID #[id number]    CC:

## 2018-06-21 ENCOUNTER — TELEPHONE (OUTPATIENT)
Dept: CARDIOLOGY | Facility: CLINIC | Age: 53
End: 2018-06-21

## 2018-06-21 NOTE — TELEPHONE ENCOUNTER
We received a message from Dr Iqbal to have patient seen in our clinic for non healing wounds of extremities.  Dr Santiago due to his schedule is not available the next few weeks.The patient said we could schedule in [place] to see cardiology there, since he wanted a sooner appointment and would prefer closer ,not [place].I explained to patient I will set up appointment with one of the vascular/Cardiology group in [place] and call him back.

## 2018-06-21 NOTE — TELEPHONE ENCOUNTER
I called and had an appointment made for the patient for Monday at 1pm with Dr Saldana.I have left message with patients VM since there was no answer.I will attempt to reach patient again.

## 2018-06-21 NOTE — TELEPHONE ENCOUNTER
Attempted to reach patient again.left message regarding upcoming appointment with Dr Saldana in [place] on Monday at 1PM.Attempted calling number for other contact Yohana Parks,it was a fax number.

## 2018-06-29 ENCOUNTER — LAB VISIT (OUTPATIENT)
Dept: LAB | Facility: HOSPITAL | Age: 53
End: 2018-06-29
Attending: SPECIALIST
Payer: COMMERCIAL

## 2018-06-29 DIAGNOSIS — N18.9 ANEMIA OF CHRONIC RENAL FAILURE: Primary | ICD-10-CM

## 2018-06-29 DIAGNOSIS — D63.1 ANEMIA OF CHRONIC RENAL FAILURE: Primary | ICD-10-CM

## 2018-06-29 LAB
HCT VFR BLD AUTO: 29.1 %
HGB BLD-MCNC: 9.1 G/DL

## 2018-06-29 PROCEDURE — 85018 HEMOGLOBIN: CPT

## 2018-06-29 PROCEDURE — 85014 HEMATOCRIT: CPT

## 2018-06-29 PROCEDURE — 36415 COLL VENOUS BLD VENIPUNCTURE: CPT | Mod: PO

## 2018-07-19 ENCOUNTER — LAB VISIT (OUTPATIENT)
Dept: LAB | Facility: HOSPITAL | Age: 53
End: 2018-07-19
Attending: SPECIALIST
Payer: COMMERCIAL

## 2018-07-19 DIAGNOSIS — N18.9 ANEMIA IN CHRONIC RENAL DISEASE: Primary | ICD-10-CM

## 2018-07-19 DIAGNOSIS — D63.1 ANEMIA IN CHRONIC RENAL DISEASE: Primary | ICD-10-CM

## 2018-07-19 LAB
HCT VFR BLD AUTO: 27.5 %
HGB BLD-MCNC: 8.7 G/DL

## 2018-07-19 PROCEDURE — 36415 COLL VENOUS BLD VENIPUNCTURE: CPT | Mod: PO

## 2018-07-19 PROCEDURE — 85014 HEMATOCRIT: CPT

## 2018-07-19 PROCEDURE — 85018 HEMOGLOBIN: CPT

## 2018-07-23 ENCOUNTER — LAB VISIT (OUTPATIENT)
Dept: LAB | Facility: HOSPITAL | Age: 53
End: 2018-07-23
Attending: SPECIALIST
Payer: COMMERCIAL

## 2018-07-23 DIAGNOSIS — D64.9 ANEMIA, UNSPECIFIED: Primary | ICD-10-CM

## 2018-07-23 DIAGNOSIS — N18.9 ANEMIA OF CHRONIC RENAL FAILURE: ICD-10-CM

## 2018-07-23 DIAGNOSIS — D50.9 IRON DEFICIENCY ANEMIA, UNSPECIFIED: ICD-10-CM

## 2018-07-23 DIAGNOSIS — D63.1 ANEMIA OF CHRONIC RENAL FAILURE: ICD-10-CM

## 2018-07-23 LAB
BASOPHILS # BLD AUTO: 0.04 K/UL
BASOPHILS NFR BLD: 0.5 %
DIFFERENTIAL METHOD: ABNORMAL
EOSINOPHIL # BLD AUTO: 0.3 K/UL
EOSINOPHIL NFR BLD: 3.7 %
ERYTHROCYTE [DISTWIDTH] IN BLOOD BY AUTOMATED COUNT: 13.3 %
FERRITIN SERPL-MCNC: 1682 NG/ML
HCT VFR BLD AUTO: 28 %
HGB BLD-MCNC: 8.7 G/DL
IMM GRANULOCYTES # BLD AUTO: 0.02 K/UL
IMM GRANULOCYTES NFR BLD AUTO: 0.2 %
IRON SERPL-MCNC: 48 UG/DL
LYMPHOCYTES # BLD AUTO: 2.1 K/UL
LYMPHOCYTES NFR BLD: 25.6 %
MCH RBC QN AUTO: 30.6 PG
MCHC RBC AUTO-ENTMCNC: 31.1 G/DL
MCV RBC AUTO: 99 FL
MONOCYTES # BLD AUTO: 0.8 K/UL
MONOCYTES NFR BLD: 9.8 %
NEUTROPHILS # BLD AUTO: 4.9 K/UL
NEUTROPHILS NFR BLD: 60.2 %
NRBC BLD-RTO: 0 /100 WBC
PLATELET # BLD AUTO: 304 K/UL
PMV BLD AUTO: 10.7 FL
RBC # BLD AUTO: 2.84 M/UL
RETICS/RBC NFR AUTO: 1.8 %
SATURATED IRON: 20 %
TOTAL IRON BINDING CAPACITY: 240 UG/DL
TRANSFERRIN SERPL-MCNC: 162 MG/DL
WBC # BLD AUTO: 8.19 K/UL

## 2018-07-23 PROCEDURE — 36415 COLL VENOUS BLD VENIPUNCTURE: CPT | Mod: PO

## 2018-07-23 PROCEDURE — 83540 ASSAY OF IRON: CPT

## 2018-07-23 PROCEDURE — 85045 AUTOMATED RETICULOCYTE COUNT: CPT

## 2018-07-23 PROCEDURE — 85025 COMPLETE CBC W/AUTO DIFF WBC: CPT

## 2018-07-23 PROCEDURE — 82728 ASSAY OF FERRITIN: CPT

## 2018-08-09 ENCOUNTER — LAB VISIT (OUTPATIENT)
Dept: LAB | Facility: HOSPITAL | Age: 53
End: 2018-08-09
Attending: SPECIALIST
Payer: COMMERCIAL

## 2018-08-09 DIAGNOSIS — N18.9 ANEMIA OF CHRONIC RENAL FAILURE: ICD-10-CM

## 2018-08-09 DIAGNOSIS — N18.4 CHRONIC KIDNEY DISEASE, STAGE IV (SEVERE): Primary | ICD-10-CM

## 2018-08-09 DIAGNOSIS — D63.1 ANEMIA OF CHRONIC RENAL FAILURE: ICD-10-CM

## 2018-08-09 DIAGNOSIS — I10 ESSENTIAL HYPERTENSION, MALIGNANT: ICD-10-CM

## 2018-08-09 DIAGNOSIS — R80.9 PROTEINURIA: ICD-10-CM

## 2018-08-09 LAB
ALBUMIN SERPL BCP-MCNC: 3.8 G/DL
ANION GAP SERPL CALC-SCNC: 12 MMOL/L
BACTERIA #/AREA URNS HPF: ABNORMAL /HPF
BASOPHILS # BLD AUTO: 0.04 K/UL
BASOPHILS NFR BLD: 0.4 %
BILIRUB UR QL STRIP: NEGATIVE
BUN SERPL-MCNC: 42 MG/DL
CALCIUM SERPL-MCNC: 10.1 MG/DL
CHLORIDE SERPL-SCNC: 108 MMOL/L
CK SERPL-CCNC: 1338 U/L
CLARITY UR: CLEAR
CO2 SERPL-SCNC: 22 MMOL/L
COLOR UR: YELLOW
CREAT SERPL-MCNC: 4.2 MG/DL
CREAT UR-MCNC: 136 MG/DL
DIFFERENTIAL METHOD: ABNORMAL
EOSINOPHIL # BLD AUTO: 0.3 K/UL
EOSINOPHIL NFR BLD: 3 %
ERYTHROCYTE [DISTWIDTH] IN BLOOD BY AUTOMATED COUNT: 14 %
EST. GFR  (AFRICAN AMERICAN): 17.3 ML/MIN/1.73 M^2
EST. GFR  (NON AFRICAN AMERICAN): 15 ML/MIN/1.73 M^2
GLUCOSE SERPL-MCNC: 108 MG/DL
GLUCOSE UR QL STRIP: NEGATIVE
HCT VFR BLD AUTO: 28.2 %
HGB BLD-MCNC: 9.1 G/DL
HGB UR QL STRIP: ABNORMAL
HYALINE CASTS #/AREA URNS LPF: 2 /LPF
IMM GRANULOCYTES # BLD AUTO: 0.04 K/UL
IMM GRANULOCYTES NFR BLD AUTO: 0.4 %
IRON SERPL-MCNC: 36 UG/DL
KETONES UR QL STRIP: NEGATIVE
LEUKOCYTE ESTERASE UR QL STRIP: NEGATIVE
LYMPHOCYTES # BLD AUTO: 2.2 K/UL
LYMPHOCYTES NFR BLD: 23.3 %
MCH RBC QN AUTO: 31.7 PG
MCHC RBC AUTO-ENTMCNC: 32.3 G/DL
MCV RBC AUTO: 98 FL
MICROSCOPIC COMMENT: ABNORMAL
MONOCYTES # BLD AUTO: 1 K/UL
MONOCYTES NFR BLD: 10.5 %
NEUTROPHILS # BLD AUTO: 5.9 K/UL
NEUTROPHILS NFR BLD: 62.4 %
NITRITE UR QL STRIP: NEGATIVE
NRBC BLD-RTO: 0 /100 WBC
PH UR STRIP: 6 [PH] (ref 5–8)
PHOSPHATE SERPL-MCNC: 3.9 MG/DL
PLATELET # BLD AUTO: 286 K/UL
PMV BLD AUTO: 11 FL
POTASSIUM SERPL-SCNC: 4 MMOL/L
PROT UR QL STRIP: ABNORMAL
PROT UR-MCNC: 366 MG/DL
PROT/CREAT UR: 2.69 MG/G{CREAT}
PTH-INTACT SERPL-MCNC: 220 PG/ML
RBC # BLD AUTO: 2.87 M/UL
RBC #/AREA URNS HPF: 1 /HPF (ref 0–4)
SATURATED IRON: 14 %
SODIUM SERPL-SCNC: 142 MMOL/L
SP GR UR STRIP: 1.02 (ref 1–1.03)
SQUAMOUS #/AREA URNS HPF: ABNORMAL /HPF
TOTAL IRON BINDING CAPACITY: 258 UG/DL
TRANSFERRIN SERPL-MCNC: 174 MG/DL
URN SPEC COLLECT METH UR: ABNORMAL
WBC # BLD AUTO: 9.43 K/UL
WBC #/AREA URNS HPF: 0 /HPF (ref 0–5)

## 2018-08-09 PROCEDURE — 80069 RENAL FUNCTION PANEL: CPT

## 2018-08-09 PROCEDURE — 83970 ASSAY OF PARATHORMONE: CPT

## 2018-08-09 PROCEDURE — 81000 URINALYSIS NONAUTO W/SCOPE: CPT | Mod: PO

## 2018-08-09 PROCEDURE — 83540 ASSAY OF IRON: CPT

## 2018-08-09 PROCEDURE — 82550 ASSAY OF CK (CPK): CPT

## 2018-08-09 PROCEDURE — 36415 COLL VENOUS BLD VENIPUNCTURE: CPT | Mod: PO

## 2018-08-09 PROCEDURE — 82570 ASSAY OF URINE CREATININE: CPT

## 2018-08-09 PROCEDURE — 85025 COMPLETE CBC W/AUTO DIFF WBC: CPT

## 2018-09-06 ENCOUNTER — TELEPHONE (OUTPATIENT)
Dept: FAMILY MEDICINE | Facility: CLINIC | Age: 53
End: 2018-09-06

## 2018-09-06 ENCOUNTER — LAB VISIT (OUTPATIENT)
Dept: LAB | Facility: HOSPITAL | Age: 53
End: 2018-09-06
Attending: SPECIALIST
Payer: COMMERCIAL

## 2018-09-06 DIAGNOSIS — N18.4 CHRONIC KIDNEY DISEASE, STAGE IV (SEVERE): Primary | ICD-10-CM

## 2018-09-06 DIAGNOSIS — I10 ESSENTIAL HYPERTENSION, MALIGNANT: ICD-10-CM

## 2018-09-06 DIAGNOSIS — R80.9 PROTEINURIA: ICD-10-CM

## 2018-09-06 LAB
ALBUMIN SERPL BCP-MCNC: 3.4 G/DL
ANION GAP SERPL CALC-SCNC: 10 MMOL/L
BACTERIA #/AREA URNS HPF: ABNORMAL /HPF
BASOPHILS # BLD AUTO: 0.05 K/UL
BASOPHILS NFR BLD: 0.7 %
BILIRUB UR QL STRIP: NEGATIVE
BUN SERPL-MCNC: 48 MG/DL
CALCIUM SERPL-MCNC: 9.9 MG/DL
CHLORIDE SERPL-SCNC: 107 MMOL/L
CLARITY UR: CLEAR
CO2 SERPL-SCNC: 22 MMOL/L
COLOR UR: YELLOW
CREAT SERPL-MCNC: 3.8 MG/DL
DIFFERENTIAL METHOD: ABNORMAL
EOSINOPHIL # BLD AUTO: 0.4 K/UL
EOSINOPHIL NFR BLD: 6.3 %
ERYTHROCYTE [DISTWIDTH] IN BLOOD BY AUTOMATED COUNT: 14.5 %
EST. GFR  (AFRICAN AMERICAN): 19.6 ML/MIN/1.73 M^2
EST. GFR  (NON AFRICAN AMERICAN): 16.9 ML/MIN/1.73 M^2
GLUCOSE SERPL-MCNC: 161 MG/DL
GLUCOSE UR QL STRIP: NEGATIVE
HCT VFR BLD AUTO: 28.8 %
HGB BLD-MCNC: 8.8 G/DL
HGB UR QL STRIP: NEGATIVE
HYALINE CASTS #/AREA URNS LPF: 3 /LPF
IMM GRANULOCYTES # BLD AUTO: 0.03 K/UL
IMM GRANULOCYTES NFR BLD AUTO: 0.4 %
KETONES UR QL STRIP: NEGATIVE
LEUKOCYTE ESTERASE UR QL STRIP: NEGATIVE
LYMPHOCYTES # BLD AUTO: 1.7 K/UL
LYMPHOCYTES NFR BLD: 23.4 %
MCH RBC QN AUTO: 29.8 PG
MCHC RBC AUTO-ENTMCNC: 30.6 G/DL
MCV RBC AUTO: 98 FL
MICROSCOPIC COMMENT: ABNORMAL
MONOCYTES # BLD AUTO: 0.7 K/UL
MONOCYTES NFR BLD: 9.5 %
NEUTROPHILS # BLD AUTO: 4.2 K/UL
NEUTROPHILS NFR BLD: 59.7 %
NITRITE UR QL STRIP: NEGATIVE
NRBC BLD-RTO: 0 /100 WBC
PH UR STRIP: 6 [PH] (ref 5–8)
PHOSPHATE SERPL-MCNC: 3.6 MG/DL
PLATELET # BLD AUTO: 263 K/UL
PMV BLD AUTO: 10.7 FL
POTASSIUM SERPL-SCNC: 4.2 MMOL/L
PROT UR QL STRIP: ABNORMAL
RBC # BLD AUTO: 2.95 M/UL
RBC #/AREA URNS HPF: 1 /HPF (ref 0–4)
SODIUM SERPL-SCNC: 139 MMOL/L
SP GR UR STRIP: 1.02 (ref 1–1.03)
SQUAMOUS #/AREA URNS HPF: 4 /HPF
URN SPEC COLLECT METH UR: ABNORMAL
WBC # BLD AUTO: 7.04 K/UL
WBC #/AREA URNS HPF: 1 /HPF (ref 0–5)

## 2018-09-06 PROCEDURE — 85025 COMPLETE CBC W/AUTO DIFF WBC: CPT

## 2018-09-06 PROCEDURE — 80069 RENAL FUNCTION PANEL: CPT

## 2018-09-06 PROCEDURE — 81000 URINALYSIS NONAUTO W/SCOPE: CPT | Mod: PO

## 2018-09-06 PROCEDURE — 36415 COLL VENOUS BLD VENIPUNCTURE: CPT | Mod: PO

## 2018-09-06 NOTE — TELEPHONE ENCOUNTER
----- Message from Mic Castellanos sent at 9/6/2018  9:36 AM CDT -----  Contact: pt   Pt returning call, pls call back         ..[phone] (zswi)

## 2018-09-06 NOTE — TELEPHONE ENCOUNTER
----- Message from Maricruz Llanes sent at 9/6/2018  7:08 AM CDT -----  Contact: [phone]   Pt is requesting to be seen for a hospital follow up to and infected toe that he had/ Pt was in [facility]/please advise/thanks

## 2018-09-11 ENCOUNTER — OFFICE VISIT (OUTPATIENT)
Dept: FAMILY MEDICINE | Facility: CLINIC | Age: 53
End: 2018-09-11
Payer: COMMERCIAL

## 2018-09-11 VITALS
HEART RATE: 66 BPM | WEIGHT: 315 LBS | TEMPERATURE: 99 F | BODY MASS INDEX: 38.36 KG/M2 | HEIGHT: 76 IN | DIASTOLIC BLOOD PRESSURE: 80 MMHG | SYSTOLIC BLOOD PRESSURE: 155 MMHG

## 2018-09-11 DIAGNOSIS — L97.912 NON-PRESSURE CHRONIC ULCER OF RIGHT LOWER LEG WITH FAT LAYER EXPOSED: Primary | ICD-10-CM

## 2018-09-11 DIAGNOSIS — I10 ESSENTIAL HYPERTENSION: ICD-10-CM

## 2018-09-11 PROCEDURE — 3077F SYST BP >= 140 MM HG: CPT | Mod: CPTII,S$GLB,, | Performed by: NURSE PRACTITIONER

## 2018-09-11 PROCEDURE — 3008F BODY MASS INDEX DOCD: CPT | Mod: CPTII,S$GLB,, | Performed by: NURSE PRACTITIONER

## 2018-09-11 PROCEDURE — 99999 PR PBB SHADOW E&M-EST. PATIENT-LVL III: CPT | Mod: PBBFAC,,, | Performed by: NURSE PRACTITIONER

## 2018-09-11 PROCEDURE — 99214 OFFICE O/P EST MOD 30 MIN: CPT | Mod: S$GLB,,, | Performed by: NURSE PRACTITIONER

## 2018-09-11 PROCEDURE — 3079F DIAST BP 80-89 MM HG: CPT | Mod: CPTII,S$GLB,, | Performed by: NURSE PRACTITIONER

## 2018-09-11 RX ORDER — CARVEDILOL 25 MG/1
25 TABLET ORAL 2 TIMES DAILY
Qty: 180 TABLET | Refills: 3 | Status: SHIPPED | OUTPATIENT
Start: 2018-09-11 | End: 2019-08-26 | Stop reason: SDUPTHER

## 2018-09-11 RX ORDER — LOSARTAN POTASSIUM 25 MG/1
25 TABLET ORAL
COMMUNITY
End: 2019-01-28

## 2018-09-11 RX ORDER — AMLODIPINE BESYLATE 2.5 MG/1
TABLET ORAL
COMMUNITY
End: 2019-04-12

## 2018-09-11 RX ORDER — ASPIRIN 81 MG/1
TABLET ORAL
COMMUNITY
End: 2019-01-28

## 2018-09-11 NOTE — PROGRESS NOTES
Subjective:       Patient ID: Leodan Dan is a 52 y.o. male.    Chief Complaint: Follow-up and Toe Pain    He comes in for hospital follow up. He was admitted for chronic wound of right foot. He reports having surgery to debrid the toe at [facility] on 8/21/18. He has a picc line inserted and is getting twice daily antibiotic infusions. He is having wound care twice weekly at [facility] wound [facility].     Hypertension   This is a chronic problem. The current episode started more than 1 year ago. The problem is uncontrolled. Pertinent negatives include no chest pain, headaches, palpitations or shortness of breath. Past treatments include angiotensin blockers, central alpha agonists, beta blockers and calcium channel blockers. The current treatment provides mild improvement. Compliance problems include diet and exercise.  Hypertensive end-organ damage includes kidney disease. Identifiable causes of hypertension include chronic renal disease.       Review of Systems   Constitutional: Negative for fatigue, fever and unexpected weight change.   HENT: Negative for ear pain and sore throat.    Eyes: Negative.  Negative for pain and visual disturbance.   Respiratory: Negative for cough and shortness of breath.    Cardiovascular: Negative for chest pain and palpitations.   Gastrointestinal: Negative for abdominal pain, diarrhea, nausea and vomiting.   Genitourinary: Negative for dysuria and frequency.   Musculoskeletal: Negative for arthralgias and myalgias.   Skin: Positive for wound. Negative for color change and rash.   Neurological: Negative for dizziness and headaches.   Psychiatric/Behavioral: Negative for sleep disturbance. The patient is not nervous/anxious.        Vitals:    09/11/18 1556   BP: (!) 155/80   Pulse:    Temp:        Objective:     Current Outpatient Medications   Medication Sig Dispense Refill    amLODIPine (NORVASC) 2.5 MG tablet amlodipine 2.5 mg tablet   Take 2 tablets every day by oral route.       ascorbic acid (VITAMIN C) 1000 MG tablet Take 1,000 mg by mouth once daily.      aspirin (ASPIR-81) 81 MG EC tablet Aspir-81   1 po qd      aspirin 325 MG tablet Take 325 mg by mouth 2 (two) times daily.       calcitRIOL (ROCALTROL) 0.5 MCG Cap Take 0.5 mcg by mouth.      carvedilol (COREG) 25 MG tablet Take 1 tablet (25 mg total) by mouth 2 (two) times daily. 180 tablet 3    ferrous sulfate 325 (65 FE) MG EC tablet Take 325 mg by mouth once daily. Not taking      furosemide (LASIX) 20 MG tablet Take 20 mg by mouth once daily.      hydrALAZINE (APRESOLINE) 50 MG tablet Take 1 tablet (50 mg total) by mouth every 8 (eight) hours. 90 tablet 11    MEN'S MULTI-VITAMIN ORAL Take by mouth.      nifedipine (PROCARDIA-XL) 90 MG (OSM) 24 hr tablet Take 90 mg by mouth.      vitamin D 1000 units Tab Take 1,000 Units by mouth once daily. 2 tablets once daily in the morning with food       ferrous sulfate 134 mg (27 mg iron) Tab Take 65 mg by mouth.      losartan (COZAAR) 100 MG tablet Take 100 mg by mouth once daily.      losartan (COZAAR) 25 MG tablet Take 25 mg by mouth.      pyridostigmine (MESTINON) 60 mg Tab TAKE 2 TABLETS BY MOUTH THREE TIMES DAILY 180 tablet 2     No current facility-administered medications for this visit.        Physical Exam   Constitutional: He is oriented to person, place, and time. He appears well-developed. No distress.   HENT:   Head: Normocephalic and atraumatic.   Eyes: EOM are normal. Pupils are equal, round, and reactive to light.   Neck: Normal range of motion. Neck supple.   Cardiovascular: Normal rate and regular rhythm.   Pulmonary/Chest: Effort normal and breath sounds normal.   Musculoskeletal: Normal range of motion.   Neurological: He is alert and oriented to person, place, and time.   Skin: Skin is warm and dry. No rash noted.        Psychiatric: He has a normal mood and affect. Thought content normal.   Nursing note and vitals reviewed.      Assessment:       1.  Non-pressure chronic ulcer of right lower leg with fat layer exposed    2. Essential hypertension        Plan:   Non-pressure chronic ulcer of right lower leg with fat layer exposed    Essential hypertension    Other orders  -     carvedilol (COREG) 25 MG tablet; Take 1 tablet (25 mg total) by mouth 2 (two) times daily.  Dispense: 180 tablet; Refill: 3        No Follow-up on file.    There are no Patient Instructions on file for this visit.

## 2018-09-17 RX ORDER — PYRIDOSTIGMINE BROMIDE 60 MG/1
TABLET ORAL
Qty: 180 TABLET | Refills: 2 | Status: SHIPPED | OUTPATIENT
Start: 2018-09-17 | End: 2019-01-18 | Stop reason: SDUPTHER

## 2018-10-05 ENCOUNTER — LAB VISIT (OUTPATIENT)
Dept: LAB | Facility: HOSPITAL | Age: 53
End: 2018-10-05
Attending: SPECIALIST
Payer: COMMERCIAL

## 2018-10-05 DIAGNOSIS — N18.4 CHRONIC KIDNEY DISEASE, STAGE IV (SEVERE): Primary | ICD-10-CM

## 2018-10-05 DIAGNOSIS — D63.1 ANEMIA OF CHRONIC RENAL FAILURE: ICD-10-CM

## 2018-10-05 DIAGNOSIS — N25.81 SECONDARY HYPERPARATHYROIDISM OF RENAL ORIGIN: ICD-10-CM

## 2018-10-05 DIAGNOSIS — R80.9 PROTEINURIA: ICD-10-CM

## 2018-10-05 DIAGNOSIS — N18.9 ANEMIA OF CHRONIC RENAL FAILURE: ICD-10-CM

## 2018-10-05 DIAGNOSIS — D64.9 ANEMIA, UNSPECIFIED: ICD-10-CM

## 2018-10-05 DIAGNOSIS — E66.01 MORBID OBESITY: ICD-10-CM

## 2018-10-05 LAB
ALBUMIN SERPL BCP-MCNC: 3.7 G/DL
ANION GAP SERPL CALC-SCNC: 10 MMOL/L
BACTERIA #/AREA URNS HPF: ABNORMAL /HPF
BASOPHILS # BLD AUTO: 0.04 K/UL
BASOPHILS NFR BLD: 0.5 %
BILIRUB UR QL STRIP: NEGATIVE
BUN SERPL-MCNC: 42 MG/DL
CALCIUM SERPL-MCNC: 9.5 MG/DL
CHLORIDE SERPL-SCNC: 107 MMOL/L
CK SERPL-CCNC: 854 U/L
CLARITY UR: CLEAR
CO2 SERPL-SCNC: 23 MMOL/L
COLOR UR: YELLOW
CREAT SERPL-MCNC: 3.9 MG/DL
DIFFERENTIAL METHOD: ABNORMAL
EOSINOPHIL # BLD AUTO: 0.4 K/UL
EOSINOPHIL NFR BLD: 4.8 %
ERYTHROCYTE [DISTWIDTH] IN BLOOD BY AUTOMATED COUNT: 14.6 %
EST. GFR  (AFRICAN AMERICAN): 18.9 ML/MIN/1.73 M^2
EST. GFR  (NON AFRICAN AMERICAN): 16.4 ML/MIN/1.73 M^2
GLUCOSE SERPL-MCNC: 97 MG/DL
GLUCOSE UR QL STRIP: NEGATIVE
HCT VFR BLD AUTO: 29.7 %
HGB BLD-MCNC: 9.4 G/DL
HGB UR QL STRIP: ABNORMAL
HYALINE CASTS #/AREA URNS LPF: 2 /LPF
IMM GRANULOCYTES # BLD AUTO: 0.04 K/UL
IMM GRANULOCYTES NFR BLD AUTO: 0.5 %
IRON SERPL-MCNC: 45 UG/DL
KETONES UR QL STRIP: NEGATIVE
LEUKOCYTE ESTERASE UR QL STRIP: NEGATIVE
LYMPHOCYTES # BLD AUTO: 1.9 K/UL
LYMPHOCYTES NFR BLD: 21.4 %
MCH RBC QN AUTO: 30 PG
MCHC RBC AUTO-ENTMCNC: 31.6 G/DL
MCV RBC AUTO: 95 FL
MICROSCOPIC COMMENT: ABNORMAL
MONOCYTES # BLD AUTO: 1 K/UL
MONOCYTES NFR BLD: 11.4 %
NEUTROPHILS # BLD AUTO: 5.4 K/UL
NEUTROPHILS NFR BLD: 61.4 %
NITRITE UR QL STRIP: NEGATIVE
NRBC BLD-RTO: 0 /100 WBC
PH UR STRIP: 6 [PH] (ref 5–8)
PHOSPHATE SERPL-MCNC: 3 MG/DL
PLATELET # BLD AUTO: 299 K/UL
PMV BLD AUTO: 10.5 FL
POTASSIUM SERPL-SCNC: 4.2 MMOL/L
PROT UR QL STRIP: ABNORMAL
PTH-INTACT SERPL-MCNC: 258 PG/ML
RBC # BLD AUTO: 3.13 M/UL
RBC #/AREA URNS HPF: 1 /HPF (ref 0–4)
SATURATED IRON: 18 %
SODIUM SERPL-SCNC: 140 MMOL/L
SP GR UR STRIP: 1.02 (ref 1–1.03)
SQUAMOUS #/AREA URNS HPF: 2 /HPF
TOTAL IRON BINDING CAPACITY: 252 UG/DL
TRANSFERRIN SERPL-MCNC: 170 MG/DL
URN SPEC COLLECT METH UR: ABNORMAL
WBC # BLD AUTO: 8.78 K/UL
WBC #/AREA URNS HPF: 0 /HPF (ref 0–5)

## 2018-10-05 PROCEDURE — 83540 ASSAY OF IRON: CPT

## 2018-10-05 PROCEDURE — 81000 URINALYSIS NONAUTO W/SCOPE: CPT | Mod: PO

## 2018-10-05 PROCEDURE — 36415 COLL VENOUS BLD VENIPUNCTURE: CPT | Mod: PO

## 2018-10-05 PROCEDURE — 80069 RENAL FUNCTION PANEL: CPT

## 2018-10-05 PROCEDURE — 85025 COMPLETE CBC W/AUTO DIFF WBC: CPT

## 2018-10-05 PROCEDURE — 87086 URINE CULTURE/COLONY COUNT: CPT

## 2018-10-05 PROCEDURE — 82550 ASSAY OF CK (CPK): CPT

## 2018-10-05 PROCEDURE — 83970 ASSAY OF PARATHORMONE: CPT

## 2018-10-07 LAB — BACTERIA UR CULT: NORMAL

## 2018-10-19 ENCOUNTER — PATIENT OUTREACH (OUTPATIENT)
Dept: ADMINISTRATIVE | Facility: HOSPITAL | Age: 53
End: 2018-10-19

## 2018-10-19 NOTE — PROGRESS NOTES
I have attempted without success to contact this patient Re scheduling an appt [facility] HTN/ BP<140/90. No answer, Left Vm. (1st Attempt)

## 2018-11-21 ENCOUNTER — LAB VISIT (OUTPATIENT)
Dept: LAB | Facility: HOSPITAL | Age: 53
End: 2018-11-21
Attending: SPECIALIST
Payer: COMMERCIAL

## 2018-11-21 DIAGNOSIS — D63.1 ANEMIA OF CHRONIC KIDNEY FAILURE: Primary | ICD-10-CM

## 2018-11-21 DIAGNOSIS — N18.9 ANEMIA OF CHRONIC KIDNEY FAILURE: Primary | ICD-10-CM

## 2018-11-21 LAB
HCT VFR BLD AUTO: 27.2 %
HGB BLD-MCNC: 8.5 G/DL

## 2018-11-21 PROCEDURE — 85018 HEMOGLOBIN: CPT

## 2018-11-21 PROCEDURE — 85014 HEMATOCRIT: CPT

## 2018-11-21 PROCEDURE — 36415 COLL VENOUS BLD VENIPUNCTURE: CPT | Mod: PO

## 2018-12-18 ENCOUNTER — TELEPHONE (OUTPATIENT)
Dept: HEMATOLOGY/ONCOLOGY | Facility: CLINIC | Age: 53
End: 2018-12-18

## 2018-12-18 NOTE — TELEPHONE ENCOUNTER
Attempted to contact pt in regards to his appt tomorrow with ANABEL Castillo. Pt did not have his lab work completed. Left message that pt's appt would have to be r/s for another date. Pt will need to have labs done prior to that appt as well. Left message with contact number for pt to return call.      Pt returned call. Pt was notified his appt for tomorrow would have to be r/s due to not having his labs done prior.  Pt's lab and f/u appts r/s to Jan. Pt will have labs done at the [place] location 1/9/19 and his f/u appt with NP on 1/16/18 at 2pm. Pt verbalized understanding.       ----- Message from eKnny Mcclain sent at 12/18/2018 10:00 AM CST -----  Contact: Patient  Advised needs to speak with the nurse has not had his lab test done yet prior to his appt on 12-19-18.   Please call [phone] (C

## 2018-12-26 ENCOUNTER — LAB VISIT (OUTPATIENT)
Dept: LAB | Facility: HOSPITAL | Age: 53
End: 2018-12-26
Attending: SPECIALIST
Payer: COMMERCIAL

## 2018-12-26 DIAGNOSIS — D50.9 IRON DEFICIENCY ANEMIA, UNSPECIFIED: ICD-10-CM

## 2018-12-26 DIAGNOSIS — D63.1 ANEMIA OF CHRONIC RENAL FAILURE: ICD-10-CM

## 2018-12-26 DIAGNOSIS — N18.4 CHRONIC KIDNEY DISEASE, STAGE IV (SEVERE): Primary | ICD-10-CM

## 2018-12-26 DIAGNOSIS — N25.81 SECONDARY HYPERPARATHYROIDISM OF RENAL ORIGIN: ICD-10-CM

## 2018-12-26 DIAGNOSIS — R80.9 PROTEINURIA: ICD-10-CM

## 2018-12-26 DIAGNOSIS — D64.9 ANEMIA, UNSPECIFIED: ICD-10-CM

## 2018-12-26 DIAGNOSIS — N18.9 ANEMIA OF CHRONIC RENAL FAILURE: ICD-10-CM

## 2018-12-26 LAB
ALBUMIN SERPL BCP-MCNC: 3.2 G/DL
ANION GAP SERPL CALC-SCNC: 10 MMOL/L
BACTERIA #/AREA URNS HPF: ABNORMAL /HPF
BASOPHILS # BLD AUTO: 0.05 K/UL
BASOPHILS NFR BLD: 0.5 %
BILIRUB UR QL STRIP: NEGATIVE
BUN SERPL-MCNC: 46 MG/DL
CALCIUM SERPL-MCNC: 9.1 MG/DL
CHLORIDE SERPL-SCNC: 107 MMOL/L
CK SERPL-CCNC: 943 U/L
CLARITY UR: CLEAR
CO2 SERPL-SCNC: 23 MMOL/L
COLOR UR: YELLOW
CREAT SERPL-MCNC: 4 MG/DL
DIFFERENTIAL METHOD: ABNORMAL
EOSINOPHIL # BLD AUTO: 0.4 K/UL
EOSINOPHIL NFR BLD: 3.9 %
ERYTHROCYTE [DISTWIDTH] IN BLOOD BY AUTOMATED COUNT: 13.9 %
EST. GFR  (AFRICAN AMERICAN): 18.2 ML/MIN/1.73 M^2
EST. GFR  (NON AFRICAN AMERICAN): 15.8 ML/MIN/1.73 M^2
GLUCOSE SERPL-MCNC: 105 MG/DL
GLUCOSE UR QL STRIP: NEGATIVE
HCT VFR BLD AUTO: 27 %
HGB BLD-MCNC: 8.5 G/DL
HGB UR QL STRIP: NEGATIVE
HYALINE CASTS #/AREA URNS LPF: 3 /LPF
IMM GRANULOCYTES # BLD AUTO: 0.04 K/UL
IMM GRANULOCYTES NFR BLD AUTO: 0.4 %
IRON SERPL-MCNC: 45 UG/DL
KETONES UR QL STRIP: NEGATIVE
LEUKOCYTE ESTERASE UR QL STRIP: NEGATIVE
LYMPHOCYTES # BLD AUTO: 2.2 K/UL
LYMPHOCYTES NFR BLD: 22.9 %
MCH RBC QN AUTO: 30.8 PG
MCHC RBC AUTO-ENTMCNC: 31.5 G/DL
MCV RBC AUTO: 98 FL
MICROSCOPIC COMMENT: ABNORMAL
MONOCYTES # BLD AUTO: 0.9 K/UL
MONOCYTES NFR BLD: 9.8 %
NEUTROPHILS # BLD AUTO: 5.9 K/UL
NEUTROPHILS NFR BLD: 62.5 %
NITRITE UR QL STRIP: NEGATIVE
NRBC BLD-RTO: 0 /100 WBC
PH UR STRIP: 6 [PH] (ref 5–8)
PHOSPHATE SERPL-MCNC: 3.7 MG/DL
PLATELET # BLD AUTO: 291 K/UL
PMV BLD AUTO: 11 FL
POTASSIUM SERPL-SCNC: 3.8 MMOL/L
PROT UR QL STRIP: ABNORMAL
PTH-INTACT SERPL-MCNC: 405 PG/ML
RBC # BLD AUTO: 2.76 M/UL
RBC #/AREA URNS HPF: 1 /HPF (ref 0–4)
SATURATED IRON: 18 %
SODIUM SERPL-SCNC: 140 MMOL/L
SP GR UR STRIP: 1.02 (ref 1–1.03)
SQUAMOUS #/AREA URNS HPF: ABNORMAL /HPF
TOTAL IRON BINDING CAPACITY: 247 UG/DL
TRANSFERRIN SERPL-MCNC: 167 MG/DL
URN SPEC COLLECT METH UR: ABNORMAL
WBC # BLD AUTO: 9.5 K/UL
WBC #/AREA URNS HPF: 0 /HPF (ref 0–5)

## 2018-12-26 PROCEDURE — 83970 ASSAY OF PARATHORMONE: CPT

## 2018-12-26 PROCEDURE — 83540 ASSAY OF IRON: CPT

## 2018-12-26 PROCEDURE — 82550 ASSAY OF CK (CPK): CPT

## 2018-12-26 PROCEDURE — 85025 COMPLETE CBC W/AUTO DIFF WBC: CPT

## 2018-12-26 PROCEDURE — 80069 RENAL FUNCTION PANEL: CPT

## 2018-12-26 PROCEDURE — 81000 URINALYSIS NONAUTO W/SCOPE: CPT | Mod: PO

## 2018-12-26 PROCEDURE — 36415 COLL VENOUS BLD VENIPUNCTURE: CPT | Mod: PO

## 2019-01-10 ENCOUNTER — LAB VISIT (OUTPATIENT)
Dept: LAB | Facility: HOSPITAL | Age: 54
End: 2019-01-10
Attending: SPECIALIST
Payer: COMMERCIAL

## 2019-01-10 DIAGNOSIS — D63.1 ANEMIA OF CHRONIC RENAL FAILURE: ICD-10-CM

## 2019-01-10 DIAGNOSIS — R80.9 PROTEINURIA: ICD-10-CM

## 2019-01-10 DIAGNOSIS — N25.81 SECONDARY HYPERPARATHYROIDISM OF RENAL ORIGIN: ICD-10-CM

## 2019-01-10 DIAGNOSIS — N18.9 ANEMIA OF CHRONIC RENAL FAILURE: ICD-10-CM

## 2019-01-10 DIAGNOSIS — E61.1 IRON DEFICIENCY: ICD-10-CM

## 2019-01-10 DIAGNOSIS — I10 ESSENTIAL HYPERTENSION, MALIGNANT: ICD-10-CM

## 2019-01-10 DIAGNOSIS — N18.4 CHRONIC KIDNEY DISEASE, STAGE IV (SEVERE): Primary | ICD-10-CM

## 2019-01-10 LAB
ALBUMIN SERPL BCP-MCNC: 3.3 G/DL
ANION GAP SERPL CALC-SCNC: 11 MMOL/L
BACTERIA #/AREA URNS HPF: ABNORMAL /HPF
BASOPHILS # BLD AUTO: 0.05 K/UL
BASOPHILS NFR BLD: 0.6 %
BILIRUB UR QL STRIP: NEGATIVE
BUN SERPL-MCNC: 44 MG/DL
CALCIUM SERPL-MCNC: 9.2 MG/DL
CHLORIDE SERPL-SCNC: 108 MMOL/L
CLARITY UR: CLEAR
CO2 SERPL-SCNC: 23 MMOL/L
COLOR UR: YELLOW
CREAT SERPL-MCNC: 4.2 MG/DL
CREAT UR-MCNC: 90 MG/DL
DIFFERENTIAL METHOD: ABNORMAL
EOSINOPHIL # BLD AUTO: 0.3 K/UL
EOSINOPHIL NFR BLD: 3.9 %
ERYTHROCYTE [DISTWIDTH] IN BLOOD BY AUTOMATED COUNT: 14.2 %
EST. GFR  (AFRICAN AMERICAN): 17.2 ML/MIN/1.73 M^2
EST. GFR  (NON AFRICAN AMERICAN): 14.9 ML/MIN/1.73 M^2
GLUCOSE SERPL-MCNC: 89 MG/DL
GLUCOSE UR QL STRIP: NEGATIVE
HCT VFR BLD AUTO: 27.5 %
HGB BLD-MCNC: 8.4 G/DL
HGB UR QL STRIP: ABNORMAL
HYALINE CASTS #/AREA URNS LPF: 3 /LPF
IMM GRANULOCYTES # BLD AUTO: 0.04 K/UL
IMM GRANULOCYTES NFR BLD AUTO: 0.5 %
IRON SERPL-MCNC: 39 UG/DL
KETONES UR QL STRIP: NEGATIVE
LEUKOCYTE ESTERASE UR QL STRIP: NEGATIVE
LYMPHOCYTES # BLD AUTO: 1.9 K/UL
LYMPHOCYTES NFR BLD: 23.4 %
MCH RBC QN AUTO: 29.3 PG
MCHC RBC AUTO-ENTMCNC: 30.5 G/DL
MCV RBC AUTO: 96 FL
MICROSCOPIC COMMENT: ABNORMAL
MONOCYTES # BLD AUTO: 0.8 K/UL
MONOCYTES NFR BLD: 10 %
NEUTROPHILS # BLD AUTO: 5.1 K/UL
NEUTROPHILS NFR BLD: 61.6 %
NITRITE UR QL STRIP: NEGATIVE
NRBC BLD-RTO: 0 /100 WBC
PH UR STRIP: 6 [PH] (ref 5–8)
PHOSPHATE SERPL-MCNC: 3.8 MG/DL
PLATELET # BLD AUTO: 277 K/UL
PMV BLD AUTO: 10.7 FL
POTASSIUM SERPL-SCNC: 4 MMOL/L
PROT UR QL STRIP: ABNORMAL
PROT UR-MCNC: 390 MG/DL
PROT/CREAT UR: 4.33 MG/G{CREAT}
PTH-INTACT SERPL-MCNC: 452 PG/ML
RBC # BLD AUTO: 2.87 M/UL
RBC #/AREA URNS HPF: 1 /HPF (ref 0–4)
SATURATED IRON: 15 %
SODIUM SERPL-SCNC: 142 MMOL/L
SP GR UR STRIP: 1.02 (ref 1–1.03)
SQUAMOUS #/AREA URNS HPF: ABNORMAL /HPF
TOTAL IRON BINDING CAPACITY: 252 UG/DL
TRANSFERRIN SERPL-MCNC: 170 MG/DL
URATE SERPL-MCNC: 8.6 MG/DL
URN SPEC COLLECT METH UR: ABNORMAL
WBC # BLD AUTO: 8.26 K/UL
WBC #/AREA URNS HPF: 0 /HPF (ref 0–5)

## 2019-01-10 PROCEDURE — 83540 ASSAY OF IRON: CPT

## 2019-01-10 PROCEDURE — 82570 ASSAY OF URINE CREATININE: CPT

## 2019-01-10 PROCEDURE — 81000 URINALYSIS NONAUTO W/SCOPE: CPT | Mod: PO

## 2019-01-10 PROCEDURE — 83970 ASSAY OF PARATHORMONE: CPT

## 2019-01-10 PROCEDURE — 85025 COMPLETE CBC W/AUTO DIFF WBC: CPT

## 2019-01-10 PROCEDURE — 80069 RENAL FUNCTION PANEL: CPT

## 2019-01-10 PROCEDURE — 84550 ASSAY OF BLOOD/URIC ACID: CPT

## 2019-01-10 PROCEDURE — 36415 COLL VENOUS BLD VENIPUNCTURE: CPT | Mod: PO

## 2019-01-15 ENCOUNTER — TELEPHONE (OUTPATIENT)
Dept: HEMATOLOGY/ONCOLOGY | Facility: CLINIC | Age: 54
End: 2019-01-15

## 2019-01-15 NOTE — TELEPHONE ENCOUNTER
Spoke w pt.  Pt hasn't had all labs drawn for hemonc.  Pt verbalized understanding.  Lab appt mad in [place] on 1/17/19.  F/u changed to 1/22/19.  Pt verbalized understanding of dates and times.

## 2019-01-17 ENCOUNTER — LAB VISIT (OUTPATIENT)
Dept: LAB | Facility: HOSPITAL | Age: 54
End: 2019-01-17
Attending: FAMILY MEDICINE
Payer: COMMERCIAL

## 2019-01-17 DIAGNOSIS — N18.4 ANEMIA IN STAGE 4 CHRONIC KIDNEY DISEASE: ICD-10-CM

## 2019-01-17 DIAGNOSIS — C90.01 MULTIPLE MYELOMA IN REMISSION: ICD-10-CM

## 2019-01-17 DIAGNOSIS — E66.01 MORBID OBESITY: ICD-10-CM

## 2019-01-17 DIAGNOSIS — D63.1 ANEMIA IN STAGE 4 CHRONIC KIDNEY DISEASE: ICD-10-CM

## 2019-01-17 LAB
ALBUMIN SERPL BCP-MCNC: 3.4 G/DL
ALP SERPL-CCNC: 77 U/L
ALT SERPL W/O P-5'-P-CCNC: 20 U/L
ANION GAP SERPL CALC-SCNC: 13 MMOL/L
AST SERPL-CCNC: 43 U/L
BACTERIA #/AREA URNS HPF: ABNORMAL /HPF
BASOPHILS # BLD AUTO: 0.03 K/UL
BASOPHILS NFR BLD: 0.4 %
BILIRUB SERPL-MCNC: 0.3 MG/DL
BILIRUB UR QL STRIP: NEGATIVE
BUN SERPL-MCNC: 55 MG/DL
CALCIUM SERPL-MCNC: 9.4 MG/DL
CHLORIDE SERPL-SCNC: 107 MMOL/L
CLARITY UR: CLEAR
CO2 SERPL-SCNC: 20 MMOL/L
COLOR UR: YELLOW
CREAT SERPL-MCNC: 4.7 MG/DL
CREAT UR-MCNC: 103 MG/DL
DIFFERENTIAL METHOD: ABNORMAL
EOSINOPHIL # BLD AUTO: 0.3 K/UL
EOSINOPHIL NFR BLD: 4.6 %
ERYTHROCYTE [DISTWIDTH] IN BLOOD BY AUTOMATED COUNT: 14.4 %
EST. GFR  (AFRICAN AMERICAN): 15 ML/MIN/1.73 M^2
EST. GFR  (NON AFRICAN AMERICAN): 13 ML/MIN/1.73 M^2
GLUCOSE SERPL-MCNC: 111 MG/DL
GLUCOSE UR QL STRIP: NEGATIVE
HCT VFR BLD AUTO: 28.4 %
HGB BLD-MCNC: 9.2 G/DL
HGB UR QL STRIP: ABNORMAL
HYALINE CASTS #/AREA URNS LPF: 2 /LPF
KETONES UR QL STRIP: NEGATIVE
LDH SERPL L TO P-CCNC: 210 U/L
LEUKOCYTE ESTERASE UR QL STRIP: NEGATIVE
LYMPHOCYTES # BLD AUTO: 1.8 K/UL
LYMPHOCYTES NFR BLD: 26.1 %
MCH RBC QN AUTO: 30.4 PG
MCHC RBC AUTO-ENTMCNC: 32.4 G/DL
MCV RBC AUTO: 94 FL
MICROSCOPIC COMMENT: ABNORMAL
MONOCYTES # BLD AUTO: 0.9 K/UL
MONOCYTES NFR BLD: 13 %
NEUTROPHILS # BLD AUTO: 3.8 K/UL
NEUTROPHILS NFR BLD: 55.9 %
NITRITE UR QL STRIP: NEGATIVE
PH UR STRIP: 6 [PH] (ref 5–8)
PLATELET # BLD AUTO: 286 K/UL
PMV BLD AUTO: 8.8 FL
POTASSIUM SERPL-SCNC: 4 MMOL/L
PROT SERPL-MCNC: 7.5 G/DL
PROT UR QL STRIP: ABNORMAL
PROT UR-MCNC: 434 MG/DL
PROT/CREAT UR: 4.21 MG/G{CREAT}
PTH-INTACT SERPL-MCNC: 430 PG/ML
RBC # BLD AUTO: 3.03 M/UL
RBC #/AREA URNS HPF: 1 /HPF (ref 0–4)
SODIUM SERPL-SCNC: 140 MMOL/L
SP GR UR STRIP: 1.02 (ref 1–1.03)
SQUAMOUS #/AREA URNS HPF: 1 /HPF
URN SPEC COLLECT METH UR: ABNORMAL
WBC # BLD AUTO: 6.78 K/UL
WBC #/AREA URNS HPF: 1 /HPF (ref 0–5)

## 2019-01-17 PROCEDURE — 84165 PROTEIN E-PHORESIS SERUM: CPT | Mod: 26,,, | Performed by: PATHOLOGY

## 2019-01-17 PROCEDURE — 82570 ASSAY OF URINE CREATININE: CPT

## 2019-01-17 PROCEDURE — 80053 COMPREHEN METABOLIC PANEL: CPT

## 2019-01-17 PROCEDURE — 83540 ASSAY OF IRON: CPT

## 2019-01-17 PROCEDURE — 36415 COLL VENOUS BLD VENIPUNCTURE: CPT | Mod: PO

## 2019-01-17 PROCEDURE — 83615 LACTATE (LD) (LDH) ENZYME: CPT

## 2019-01-17 PROCEDURE — 81000 URINALYSIS NONAUTO W/SCOPE: CPT | Mod: PO

## 2019-01-17 PROCEDURE — 85025 COMPLETE CBC W/AUTO DIFF WBC: CPT | Mod: PO

## 2019-01-17 PROCEDURE — 83520 IMMUNOASSAY QUANT NOS NONAB: CPT | Mod: 59

## 2019-01-17 PROCEDURE — 84100 ASSAY OF PHOSPHORUS: CPT

## 2019-01-17 PROCEDURE — 84165 PROTEIN E-PHORESIS SERUM: CPT

## 2019-01-17 PROCEDURE — 86334 IMMUNOFIX E-PHORESIS SERUM: CPT | Mod: 26,,, | Performed by: PATHOLOGY

## 2019-01-17 PROCEDURE — 86334 PATHOLOGIST INTERPRETATION IFE: ICD-10-PCS | Mod: 26,,, | Performed by: PATHOLOGY

## 2019-01-17 PROCEDURE — 84550 ASSAY OF BLOOD/URIC ACID: CPT

## 2019-01-17 PROCEDURE — 84165 PATHOLOGIST INTERPRETATION SPE: ICD-10-PCS | Mod: 26,,, | Performed by: PATHOLOGY

## 2019-01-17 PROCEDURE — 83970 ASSAY OF PARATHORMONE: CPT

## 2019-01-17 PROCEDURE — 82232 ASSAY OF BETA-2 PROTEIN: CPT

## 2019-01-17 PROCEDURE — 86334 IMMUNOFIX E-PHORESIS SERUM: CPT

## 2019-01-18 LAB
ALBUMIN SERPL ELPH-MCNC: 3.56 G/DL
ALPHA1 GLOB SERPL ELPH-MCNC: 0.32 G/DL
ALPHA2 GLOB SERPL ELPH-MCNC: 0.97 G/DL
B-GLOBULIN SERPL ELPH-MCNC: 0.83 G/DL
B2 MICROGLOB SERPL-MCNC: 6.8 UG/ML
GAMMA GLOB SERPL ELPH-MCNC: 1.12 G/DL
INTERPRETATION SERPL IFE-IMP: NORMAL
IRON SERPL-MCNC: 35 UG/DL
KAPPA LC SER QL IA: 7.03 MG/DL
KAPPA LC/LAMBDA SER IA: 1.57
LAMBDA LC SER QL IA: 4.48 MG/DL
PATHOLOGIST INTERPRETATION IFE: NORMAL
PATHOLOGIST INTERPRETATION SPE: NORMAL
PHOSPHATE SERPL-MCNC: 5 MG/DL
PROT SERPL-MCNC: 6.8 G/DL
SATURATED IRON: 14 %
TOTAL IRON BINDING CAPACITY: 250 UG/DL
TRANSFERRIN SERPL-MCNC: 169 MG/DL
URATE SERPL-MCNC: 8.4 MG/DL

## 2019-01-18 RX ORDER — PYRIDOSTIGMINE BROMIDE 60 MG/1
TABLET ORAL
Qty: 180 TABLET | Refills: 2 | Status: SHIPPED | OUTPATIENT
Start: 2019-01-18 | End: 2019-04-12 | Stop reason: SDUPTHER

## 2019-01-22 ENCOUNTER — TELEPHONE (OUTPATIENT)
Dept: HEMATOLOGY/ONCOLOGY | Facility: CLINIC | Age: 54
End: 2019-01-22

## 2019-01-22 NOTE — TELEPHONE ENCOUNTER
Returned pt's call. Pt missed appt today due to car issues. Pt r/s to 1/28/19 @ 1pm. Pt verbalized understanding and appreciation.

## 2019-01-22 NOTE — TELEPHONE ENCOUNTER
----- Message from Barry Connelly sent at 1/22/2019  3:50 PM CST -----  Type: Needs Medical Advice    Who Called:  Patient    Best Call Back Number: [phone]  Additional Information: Caller states that he would like a callback regarding rescheduling an appointment

## 2019-01-28 ENCOUNTER — OFFICE VISIT (OUTPATIENT)
Dept: HEMATOLOGY/ONCOLOGY | Facility: CLINIC | Age: 54
End: 2019-01-28
Payer: COMMERCIAL

## 2019-01-28 VITALS
BODY MASS INDEX: 38.36 KG/M2 | SYSTOLIC BLOOD PRESSURE: 197 MMHG | TEMPERATURE: 98 F | HEART RATE: 70 BPM | RESPIRATION RATE: 20 BRPM | WEIGHT: 315 LBS | HEIGHT: 76 IN | DIASTOLIC BLOOD PRESSURE: 93 MMHG

## 2019-01-28 DIAGNOSIS — D63.1 ANEMIA OF RENAL DISEASE: ICD-10-CM

## 2019-01-28 DIAGNOSIS — N18.5 CKD STAGE 5 SECONDARY TO HYPERTENSION: ICD-10-CM

## 2019-01-28 DIAGNOSIS — T81.89XS NONHEALING SURGICAL WOUND, SEQUELA: ICD-10-CM

## 2019-01-28 DIAGNOSIS — N18.9 ANEMIA OF RENAL DISEASE: ICD-10-CM

## 2019-01-28 DIAGNOSIS — E66.01 MORBID OBESITY: ICD-10-CM

## 2019-01-28 DIAGNOSIS — I12.0 CKD STAGE 5 SECONDARY TO HYPERTENSION: ICD-10-CM

## 2019-01-28 DIAGNOSIS — I10 HYPERTENSION, UNSPECIFIED TYPE: ICD-10-CM

## 2019-01-28 DIAGNOSIS — C90.01 MULTIPLE MYELOMA IN REMISSION: Primary | ICD-10-CM

## 2019-01-28 PROCEDURE — 99999 PR PBB SHADOW E&M-EST. PATIENT-LVL IV: CPT | Mod: PBBFAC,,, | Performed by: NURSE PRACTITIONER

## 2019-01-28 PROCEDURE — 99999 PR PBB SHADOW E&M-EST. PATIENT-LVL IV: ICD-10-PCS | Mod: PBBFAC,,, | Performed by: NURSE PRACTITIONER

## 2019-01-28 PROCEDURE — 3008F PR BODY MASS INDEX (BMI) DOCUMENTED: ICD-10-PCS | Mod: CPTII,S$GLB,, | Performed by: NURSE PRACTITIONER

## 2019-01-28 PROCEDURE — 3080F DIAST BP >= 90 MM HG: CPT | Mod: CPTII,S$GLB,, | Performed by: NURSE PRACTITIONER

## 2019-01-28 PROCEDURE — 99213 OFFICE O/P EST LOW 20 MIN: CPT | Mod: S$GLB,,, | Performed by: NURSE PRACTITIONER

## 2019-01-28 PROCEDURE — 3077F SYST BP >= 140 MM HG: CPT | Mod: CPTII,S$GLB,, | Performed by: NURSE PRACTITIONER

## 2019-01-28 PROCEDURE — 3080F PR MOST RECENT DIASTOLIC BLOOD PRESSURE >= 90 MM HG: ICD-10-PCS | Mod: CPTII,S$GLB,, | Performed by: NURSE PRACTITIONER

## 2019-01-28 PROCEDURE — 3008F BODY MASS INDEX DOCD: CPT | Mod: CPTII,S$GLB,, | Performed by: NURSE PRACTITIONER

## 2019-01-28 PROCEDURE — 3077F PR MOST RECENT SYSTOLIC BLOOD PRESSURE >= 140 MM HG: ICD-10-PCS | Mod: CPTII,S$GLB,, | Performed by: NURSE PRACTITIONER

## 2019-01-28 PROCEDURE — 99213 PR OFFICE/OUTPT VISIT, EST, LEVL III, 20-29 MIN: ICD-10-PCS | Mod: S$GLB,,, | Performed by: NURSE PRACTITIONER

## 2019-01-28 NOTE — PROGRESS NOTES
HISTORY OF PRESENT ILLNESS:  This is a 53-year-old black gentleman well   known to Dr. Iqbal for multiple myeloma that went into remission following   induction therapy with ZRD.  He had a protracted course of maintenance Revlimid   as the patient's paraprotein levels were undetectable for a long period of time.    He was taken off all therapy and observed.  His course has been complicated by   difficulties with HTN, worsening CKD and a nonhealing right lower extremity wound.    He presents to the clinic today for interval evaluation.  He states he continues   with wound therapy.  He reports that Dr. Bruner, Nephrology, has increased   his Procrit from 10,000 units to 15,000 units.  He denies any difficulties with   fevers, chills, drenching night sweats, painful lymphadenopathy, weight loss,   rashes, pruritis, dyspnea, weakness, fatigue, etc.  No new complaints or pertinent   findings on a 10-point review of systems.    PHYSICAL EXAMINATION:  GENERAL:  Well-developed, morbidly obese, black gentleman in no acute distress.   Alert & oriented x 3.  VITAL SIGNS:    Wt Readings from Last 3 Encounters:   01/28/19 (!) 212.7 kg (468 lb 14.7 oz)   09/11/18 (!) 205.5 kg (453 lb)   06/19/18 (!) 216.2 kg (476 lb 10.2 oz)     Temp Readings from Last 3 Encounters:   01/28/19 98.2 °F (36.8 °C)   09/11/18 99.1 °F (37.3 °C) (Oral)   06/19/18 98.6 °F (37 °C) (Oral)     BP Readings from Last 3 Encounters:   01/28/19 (!) 197/93   09/11/18 (!) 155/80   06/19/18 124/65     Pulse Readings from Last 3 Encounters:   01/28/19 70   09/11/18 66   06/19/18 (!) 57     HEENT:  Normocephalic, atraumatic.  Oral mucosa pink and moist.  Lips without   lesions.  Tongue midline.  Oropharynx clear.  Nonicteric sclerae.   NECK:  Supple.  No adenopathy.  HEART:  Regular rate and rhythm without murmur, gallop or rub.               LUNGS:  Clear to auscultation bilaterally.  NL respiratory effort.                              ABDOMEN:  Soft, obese,  nontender and nondistended with positive normoactive bowel   sounds.  No hepatosplenomegaly.  EXTREMITIES:  Right lower extremity with ace dressing with noted purulent   Drainage.  Protective shoe intact.    LABORATORY:    Lab Results   Component Value Date    WBC 6.78 01/17/2019    HGB 9.2 (L) 01/17/2019    HCT 28.4 (L) 01/17/2019    MCV 94 01/17/2019     01/17/2019     Unremarkable differential    CMP  Sodium   Date Value Ref Range Status   01/17/2019 140 136 - 145 mmol/L Final     Potassium   Date Value Ref Range Status   01/17/2019 4.0 3.5 - 5.1 mmol/L Final     Chloride   Date Value Ref Range Status   01/17/2019 107 95 - 110 mmol/L Final     CO2   Date Value Ref Range Status   01/17/2019 20 (L) 23 - 29 mmol/L Final     Glucose   Date Value Ref Range Status   01/17/2019 111 (H) 70 - 110 mg/dL Final     BUN, Bld   Date Value Ref Range Status   01/17/2019 55 (H) 6 - 20 mg/dL Final     Creatinine   Date Value Ref Range Status   01/17/2019 4.7 (H) 0.5 - 1.4 mg/dL Final     Calcium   Date Value Ref Range Status   01/17/2019 9.4 8.7 - 10.5 mg/dL Final     Total Protein   Date Value Ref Range Status   01/17/2019 7.5 6.0 - 8.4 g/dL Final     Albumin   Date Value Ref Range Status   01/17/2019 3.4 (L) 3.5 - 5.2 g/dL Final     Total Bilirubin   Date Value Ref Range Status   01/17/2019 0.3 0.1 - 1.0 mg/dL Final     Comment:     For infants and newborns, interpretation of results should be based  on gestational age, weight and in agreement with clinical  observations.  Premature Infant recommended reference ranges:  Up to 24 hours.............<8.0 mg/dL  Up to 48 hours............<12.0 mg/dL  3-5 days..................<15.0 mg/dL  6-29 days.................<15.0 mg/dL       Alkaline Phosphatase   Date Value Ref Range Status   01/17/2019 77 55 - 135 U/L Final     AST   Date Value Ref Range Status   01/17/2019 43 (H) 10 - 40 U/L Final     ALT   Date Value Ref Range Status   01/17/2019 20 10 - 44 U/L Final     Anion Gap    Date Value Ref Range Status   01/17/2019 13 8 - 16 mmol/L Final     eGFR if    Date Value Ref Range Status   01/17/2019 15.0 (A) >60 mL/min/1.73 m^2 Final     eGFR if non    Date Value Ref Range Status   01/17/2019 13.0 (A) >60 mL/min/1.73 m^2 Final     Comment:     Calculation used to obtain the estimated glomerular filtration  rate (eGFR) is the CKD-EPI equation.          [id number]:  6.8 (6.9)  SPEP is negative for monoclonal paraprotein.    Free light chain analysis has a normal ratio.      IMPRESSION:  1.  Multiple myeloma -- remains without evidence of relapse.  2.  Morbid obesity.  3.  Chronic nonhealing lower extremity wounds - attending wound therapy, Dr. Charles  4.  Anemia in the setting of stage V chronic kidney disease - follows with Dr. Bruner; appt next week    PLAN:  1.  In regard to myeloma, continue observation and have the patient return to clinic in six months   with interval CBC, CMP, LDH, SPEP with immunofixation, beta-2 microglobulin and free light chain   analysis prior.  2.  Continue efforts at weight loss.  3.  Follow up with Dr. Soha Bruner in regard to CKD.    Assessment/plan reviewed and approved by Dr. Iqbal.

## 2019-02-04 ENCOUNTER — LAB VISIT (OUTPATIENT)
Dept: LAB | Facility: HOSPITAL | Age: 54
End: 2019-02-04
Attending: SPECIALIST
Payer: COMMERCIAL

## 2019-02-04 DIAGNOSIS — D63.1 ANEMIA IN CKD (CHRONIC KIDNEY DISEASE): Primary | ICD-10-CM

## 2019-02-04 DIAGNOSIS — N18.9 ANEMIA IN CKD (CHRONIC KIDNEY DISEASE): Primary | ICD-10-CM

## 2019-02-04 LAB
HCT VFR BLD AUTO: 28.9 %
HGB BLD-MCNC: 8.7 G/DL

## 2019-02-04 PROCEDURE — 85018 HEMOGLOBIN: CPT

## 2019-02-04 PROCEDURE — 36415 COLL VENOUS BLD VENIPUNCTURE: CPT | Mod: PO

## 2019-02-04 PROCEDURE — 85014 HEMATOCRIT: CPT

## 2019-02-14 ENCOUNTER — LAB VISIT (OUTPATIENT)
Dept: LAB | Facility: HOSPITAL | Age: 54
End: 2019-02-14
Attending: SPECIALIST
Payer: COMMERCIAL

## 2019-02-14 DIAGNOSIS — R80.9 PROTEINURIA: ICD-10-CM

## 2019-02-14 DIAGNOSIS — N18.4 CHRONIC KIDNEY DISEASE, STAGE IV (SEVERE): Primary | ICD-10-CM

## 2019-02-14 DIAGNOSIS — N25.81 SECONDARY HYPERPARATHYROIDISM OF RENAL ORIGIN: ICD-10-CM

## 2019-02-14 DIAGNOSIS — E61.1 IRON DEFICIENCY: ICD-10-CM

## 2019-02-14 DIAGNOSIS — Z79.899 ENCOUNTER FOR LONG-TERM (CURRENT) USE OF MEDICATIONS: ICD-10-CM

## 2019-02-14 DIAGNOSIS — D63.1 ANEMIA OF CHRONIC RENAL FAILURE: ICD-10-CM

## 2019-02-14 DIAGNOSIS — I10 ESSENTIAL HYPERTENSION, MALIGNANT: ICD-10-CM

## 2019-02-14 DIAGNOSIS — N18.9 ANEMIA OF CHRONIC RENAL FAILURE: ICD-10-CM

## 2019-02-14 LAB
ALBUMIN SERPL BCP-MCNC: 3.3 G/DL
ANION GAP SERPL CALC-SCNC: 13 MMOL/L
BACTERIA #/AREA URNS HPF: ABNORMAL /HPF
BASOPHILS # BLD AUTO: 0.04 K/UL
BASOPHILS NFR BLD: 0.5 %
BILIRUB UR QL STRIP: NEGATIVE
BUN SERPL-MCNC: 50 MG/DL
CALCIUM SERPL-MCNC: 9 MG/DL
CHLORIDE SERPL-SCNC: 109 MMOL/L
CLARITY UR: CLEAR
CO2 SERPL-SCNC: 20 MMOL/L
COLOR UR: YELLOW
CREAT SERPL-MCNC: 4.6 MG/DL
CREAT UR-MCNC: 129 MG/DL
DIFFERENTIAL METHOD: ABNORMAL
EOSINOPHIL # BLD AUTO: 0.4 K/UL
EOSINOPHIL NFR BLD: 4.3 %
ERYTHROCYTE [DISTWIDTH] IN BLOOD BY AUTOMATED COUNT: 14.5 %
EST. GFR  (AFRICAN AMERICAN): 15.4 ML/MIN/1.73 M^2
EST. GFR  (NON AFRICAN AMERICAN): 13.3 ML/MIN/1.73 M^2
GLUCOSE SERPL-MCNC: 159 MG/DL
GLUCOSE UR QL STRIP: NEGATIVE
HCT VFR BLD AUTO: 28.3 %
HGB BLD-MCNC: 8.7 G/DL
HGB UR QL STRIP: NEGATIVE
HYALINE CASTS #/AREA URNS LPF: 2 /LPF
IMM GRANULOCYTES # BLD AUTO: 0.05 K/UL
IMM GRANULOCYTES NFR BLD AUTO: 0.6 %
IRON SERPL-MCNC: 33 UG/DL
KETONES UR QL STRIP: NEGATIVE
LEUKOCYTE ESTERASE UR QL STRIP: NEGATIVE
LYMPHOCYTES # BLD AUTO: 1.9 K/UL
LYMPHOCYTES NFR BLD: 22.2 %
MCH RBC QN AUTO: 30.5 PG
MCHC RBC AUTO-ENTMCNC: 30.7 G/DL
MCV RBC AUTO: 99 FL
MICROSCOPIC COMMENT: ABNORMAL
MONOCYTES # BLD AUTO: 0.6 K/UL
MONOCYTES NFR BLD: 7.4 %
NEUTROPHILS # BLD AUTO: 5.7 K/UL
NEUTROPHILS NFR BLD: 65 %
NITRITE UR QL STRIP: NEGATIVE
NRBC BLD-RTO: 0 /100 WBC
PH UR STRIP: 6 [PH] (ref 5–8)
PHOSPHATE SERPL-MCNC: 4 MG/DL
PLATELET # BLD AUTO: 291 K/UL
PMV BLD AUTO: 10.5 FL
POTASSIUM SERPL-SCNC: 3.9 MMOL/L
PROT UR QL STRIP: ABNORMAL
PROT UR-MCNC: 478 MG/DL
PROT/CREAT UR: 3.71 MG/G{CREAT}
PTH-INTACT SERPL-MCNC: 528 PG/ML
RBC # BLD AUTO: 2.85 M/UL
RBC #/AREA URNS HPF: 1 /HPF (ref 0–4)
SATURATED IRON: 14 %
SODIUM SERPL-SCNC: 142 MMOL/L
SP GR UR STRIP: 1.02 (ref 1–1.03)
SQUAMOUS #/AREA URNS HPF: ABNORMAL /HPF
TOTAL IRON BINDING CAPACITY: 235 UG/DL
TRANSFERRIN SERPL-MCNC: 159 MG/DL
URATE SERPL-MCNC: 9.3 MG/DL
URN SPEC COLLECT METH UR: ABNORMAL
WBC # BLD AUTO: 8.7 K/UL
WBC #/AREA URNS HPF: 0 /HPF (ref 0–5)

## 2019-02-14 PROCEDURE — 83540 ASSAY OF IRON: CPT

## 2019-02-14 PROCEDURE — 82570 ASSAY OF URINE CREATININE: CPT

## 2019-02-14 PROCEDURE — 80069 RENAL FUNCTION PANEL: CPT

## 2019-02-14 PROCEDURE — 83970 ASSAY OF PARATHORMONE: CPT

## 2019-02-14 PROCEDURE — 85025 COMPLETE CBC W/AUTO DIFF WBC: CPT

## 2019-02-14 PROCEDURE — 84550 ASSAY OF BLOOD/URIC ACID: CPT

## 2019-02-14 PROCEDURE — 81000 URINALYSIS NONAUTO W/SCOPE: CPT | Mod: PO

## 2019-02-14 PROCEDURE — 36415 COLL VENOUS BLD VENIPUNCTURE: CPT | Mod: PO

## 2019-03-06 ENCOUNTER — LAB VISIT (OUTPATIENT)
Dept: LAB | Facility: HOSPITAL | Age: 54
End: 2019-03-06
Attending: SPECIALIST
Payer: COMMERCIAL

## 2019-03-06 DIAGNOSIS — D63.1 ANEMIA IN CHRONIC RENAL DISEASE: Primary | ICD-10-CM

## 2019-03-06 DIAGNOSIS — N18.9 ANEMIA IN CHRONIC RENAL DISEASE: Primary | ICD-10-CM

## 2019-03-06 LAB
HCT VFR BLD AUTO: 27.8 %
HGB BLD-MCNC: 8.6 G/DL

## 2019-03-06 PROCEDURE — 85014 HEMATOCRIT: CPT

## 2019-03-06 PROCEDURE — 36415 COLL VENOUS BLD VENIPUNCTURE: CPT | Mod: PO

## 2019-03-06 PROCEDURE — 85018 HEMOGLOBIN: CPT

## 2019-04-02 ENCOUNTER — LAB VISIT (OUTPATIENT)
Dept: LAB | Facility: HOSPITAL | Age: 54
End: 2019-04-02
Attending: SPECIALIST
Payer: COMMERCIAL

## 2019-04-02 DIAGNOSIS — I10 ESSENTIAL HYPERTENSION, MALIGNANT: ICD-10-CM

## 2019-04-02 DIAGNOSIS — N18.9 ANEMIA OF CHRONIC RENAL FAILURE: ICD-10-CM

## 2019-04-02 DIAGNOSIS — E61.1 IRON DEFICIENCY: ICD-10-CM

## 2019-04-02 DIAGNOSIS — D50.9 IRON DEFICIENCY ANEMIA, UNSPECIFIED: ICD-10-CM

## 2019-04-02 DIAGNOSIS — D63.1 ANEMIA OF CHRONIC RENAL FAILURE: ICD-10-CM

## 2019-04-02 DIAGNOSIS — R80.9 PROTEINURIA: ICD-10-CM

## 2019-04-02 DIAGNOSIS — Z79.899 ENCOUNTER FOR LONG-TERM (CURRENT) USE OF MEDICATIONS: ICD-10-CM

## 2019-04-02 DIAGNOSIS — N18.4 CHRONIC KIDNEY DISEASE, STAGE IV (SEVERE): Primary | ICD-10-CM

## 2019-04-02 DIAGNOSIS — N25.81 SECONDARY HYPERPARATHYROIDISM OF RENAL ORIGIN: ICD-10-CM

## 2019-04-02 LAB
ALBUMIN SERPL BCP-MCNC: 3 G/DL (ref 3.5–5.2)
ANION GAP SERPL CALC-SCNC: 12 MMOL/L (ref 8–16)
BACTERIA #/AREA URNS HPF: NORMAL /HPF
BASOPHILS # BLD AUTO: 0.03 K/UL (ref 0–0.2)
BASOPHILS NFR BLD: 0.4 % (ref 0–1.9)
BILIRUB UR QL STRIP: NEGATIVE
BUN SERPL-MCNC: 40 MG/DL (ref 6–20)
CALCIUM SERPL-MCNC: 9 MG/DL (ref 8.7–10.5)
CHLORIDE SERPL-SCNC: 109 MMOL/L (ref 95–110)
CLARITY UR: CLEAR
CO2 SERPL-SCNC: 21 MMOL/L (ref 23–29)
COLOR UR: YELLOW
CREAT SERPL-MCNC: 4.5 MG/DL (ref 0.5–1.4)
CREAT UR-MCNC: 103 MG/DL (ref 23–375)
DIFFERENTIAL METHOD: ABNORMAL
EOSINOPHIL # BLD AUTO: 0.5 K/UL (ref 0–0.5)
EOSINOPHIL NFR BLD: 6.2 % (ref 0–8)
ERYTHROCYTE [DISTWIDTH] IN BLOOD BY AUTOMATED COUNT: 14.3 % (ref 11.5–14.5)
EST. GFR  (AFRICAN AMERICAN): 15.8 ML/MIN/1.73 M^2
EST. GFR  (NON AFRICAN AMERICAN): 13.7 ML/MIN/1.73 M^2
GLUCOSE SERPL-MCNC: 176 MG/DL (ref 70–110)
GLUCOSE UR QL STRIP: ABNORMAL
HCT VFR BLD AUTO: 27.6 % (ref 40–54)
HGB BLD-MCNC: 8.4 G/DL (ref 14–18)
HGB UR QL STRIP: ABNORMAL
HYALINE CASTS #/AREA URNS LPF: 0 /LPF
IMM GRANULOCYTES # BLD AUTO: 0.03 K/UL (ref 0–0.04)
IMM GRANULOCYTES NFR BLD AUTO: 0.4 % (ref 0–0.5)
IRON SERPL-MCNC: 50 UG/DL (ref 45–160)
KETONES UR QL STRIP: NEGATIVE
LEUKOCYTE ESTERASE UR QL STRIP: NEGATIVE
LYMPHOCYTES # BLD AUTO: 1.7 K/UL (ref 1–4.8)
LYMPHOCYTES NFR BLD: 21.7 % (ref 18–48)
MCH RBC QN AUTO: 30.4 PG (ref 27–31)
MCHC RBC AUTO-ENTMCNC: 30.4 G/DL (ref 32–36)
MCV RBC AUTO: 100 FL (ref 82–98)
MICROSCOPIC COMMENT: NORMAL
MONOCYTES # BLD AUTO: 0.9 K/UL (ref 0.3–1)
MONOCYTES NFR BLD: 11.7 % (ref 4–15)
NEUTROPHILS # BLD AUTO: 4.6 K/UL (ref 1.8–7.7)
NEUTROPHILS NFR BLD: 59.6 % (ref 38–73)
NITRITE UR QL STRIP: NEGATIVE
NRBC BLD-RTO: 0 /100 WBC
PH UR STRIP: 6 [PH] (ref 5–8)
PHOSPHATE SERPL-MCNC: 3.2 MG/DL (ref 2.7–4.5)
PLATELET # BLD AUTO: 270 K/UL (ref 150–350)
PMV BLD AUTO: 10.6 FL (ref 9.2–12.9)
POTASSIUM SERPL-SCNC: 4 MMOL/L (ref 3.5–5.1)
PROT UR QL STRIP: ABNORMAL
PROT UR-MCNC: 604 MG/DL (ref 0–15)
PROT/CREAT UR: 5.86 MG/G{CREAT} (ref 0–0.2)
PTH-INTACT SERPL-MCNC: 493 PG/ML (ref 9–77)
RBC # BLD AUTO: 2.76 M/UL (ref 4.6–6.2)
RBC #/AREA URNS HPF: 0 /HPF (ref 0–4)
SATURATED IRON: 21 % (ref 20–50)
SODIUM SERPL-SCNC: 142 MMOL/L (ref 136–145)
SP GR UR STRIP: 1.02 (ref 1–1.03)
SQUAMOUS #/AREA URNS HPF: 2 /HPF
TOTAL IRON BINDING CAPACITY: 243 UG/DL (ref 250–450)
TRANSFERRIN SERPL-MCNC: 164 MG/DL (ref 200–375)
URATE SERPL-MCNC: 6.9 MG/DL (ref 3.4–7)
URN SPEC COLLECT METH UR: ABNORMAL
WBC # BLD AUTO: 7.62 K/UL (ref 3.9–12.7)
WBC #/AREA URNS HPF: 1 /HPF (ref 0–5)

## 2019-04-02 PROCEDURE — 81000 URINALYSIS NONAUTO W/SCOPE: CPT | Mod: PO

## 2019-04-02 PROCEDURE — 36415 COLL VENOUS BLD VENIPUNCTURE: CPT | Mod: PO

## 2019-04-02 PROCEDURE — 84550 ASSAY OF BLOOD/URIC ACID: CPT

## 2019-04-02 PROCEDURE — 84156 ASSAY OF PROTEIN URINE: CPT

## 2019-04-02 PROCEDURE — 83540 ASSAY OF IRON: CPT

## 2019-04-02 PROCEDURE — 83970 ASSAY OF PARATHORMONE: CPT

## 2019-04-02 PROCEDURE — 80069 RENAL FUNCTION PANEL: CPT

## 2019-04-02 PROCEDURE — 85025 COMPLETE CBC W/AUTO DIFF WBC: CPT

## 2019-04-05 ENCOUNTER — TELEPHONE (OUTPATIENT)
Dept: FAMILY MEDICINE | Facility: CLINIC | Age: 54
End: 2019-04-05

## 2019-04-05 NOTE — TELEPHONE ENCOUNTER
----- Message from Nicki Bruner sent at 4/5/2019 11:51 AM CDT -----  Contact: [facility] [place]  Type:  Sooner Apoointment Request    Caller is requesting a sooner appointment.  Caller declined first available appointment listed below.  Caller will not accept being placed on the waitlist and is requesting a message be sent to doctor.  Name of Caller: [place]  When is the first available appointment? 05/08/2019  Symptoms: hospital f/u  Would the patient rather a call back or a response via [facility]? Call back  Best Call Back Number: [phone]  Additional Information: the pt needs to be worked in within a week

## 2019-04-08 ENCOUNTER — TELEPHONE (OUTPATIENT)
Dept: FAMILY MEDICINE | Facility: CLINIC | Age: 54
End: 2019-04-08

## 2019-04-08 ENCOUNTER — LAB VISIT (OUTPATIENT)
Dept: LAB | Facility: HOSPITAL | Age: 54
End: 2019-04-08
Attending: SPECIALIST
Payer: COMMERCIAL

## 2019-04-08 DIAGNOSIS — I87.311 IDIOPATHIC CHRONIC VENOUS HYPERTENSION OF RIGHT LOWER EXTREMITY WITH ULCER: Primary | ICD-10-CM

## 2019-04-08 DIAGNOSIS — L97.919 IDIOPATHIC CHRONIC VENOUS HYPERTENSION OF RIGHT LOWER EXTREMITY WITH ULCER: Primary | ICD-10-CM

## 2019-04-08 LAB
ALBUMIN SERPL BCP-MCNC: 2.9 G/DL (ref 3.5–5.2)
ALP SERPL-CCNC: 76 U/L (ref 55–135)
ALT SERPL W/O P-5'-P-CCNC: 70 U/L (ref 10–44)
ANION GAP SERPL CALC-SCNC: 10 MMOL/L (ref 8–16)
AST SERPL-CCNC: 60 U/L (ref 10–40)
BASOPHILS # BLD AUTO: 0.04 K/UL (ref 0–0.2)
BASOPHILS NFR BLD: 0.6 % (ref 0–1.9)
BILIRUB SERPL-MCNC: 0.3 MG/DL (ref 0.1–1)
BUN SERPL-MCNC: 49 MG/DL (ref 6–20)
CALCIUM SERPL-MCNC: 9.4 MG/DL (ref 8.7–10.5)
CHLORIDE SERPL-SCNC: 106 MMOL/L (ref 95–110)
CO2 SERPL-SCNC: 21 MMOL/L (ref 23–29)
CREAT SERPL-MCNC: 5.1 MG/DL (ref 0.5–1.4)
CRP SERPL-MCNC: 14 MG/L (ref 0–8.2)
DIFFERENTIAL METHOD: ABNORMAL
EOSINOPHIL # BLD AUTO: 0.3 K/UL (ref 0–0.5)
EOSINOPHIL NFR BLD: 4.8 % (ref 0–8)
ERYTHROCYTE [DISTWIDTH] IN BLOOD BY AUTOMATED COUNT: 14.2 % (ref 11.5–14.5)
ERYTHROCYTE [SEDIMENTATION RATE] IN BLOOD BY WESTERGREN METHOD: 56 MM/HR (ref 0–10)
EST. GFR  (AFRICAN AMERICAN): 13.6 ML/MIN/1.73 M^2
EST. GFR  (NON AFRICAN AMERICAN): 11.8 ML/MIN/1.73 M^2
GLUCOSE SERPL-MCNC: 166 MG/DL (ref 70–110)
HCT VFR BLD AUTO: 26.7 % (ref 40–54)
HGB BLD-MCNC: 8.3 G/DL (ref 14–18)
IMM GRANULOCYTES # BLD AUTO: 0.03 K/UL (ref 0–0.04)
IMM GRANULOCYTES NFR BLD AUTO: 0.5 % (ref 0–0.5)
LYMPHOCYTES # BLD AUTO: 1.8 K/UL (ref 1–4.8)
LYMPHOCYTES NFR BLD: 27.3 % (ref 18–48)
MCH RBC QN AUTO: 30.4 PG (ref 27–31)
MCHC RBC AUTO-ENTMCNC: 31.1 G/DL (ref 32–36)
MCV RBC AUTO: 98 FL (ref 82–98)
MONOCYTES # BLD AUTO: 0.8 K/UL (ref 0.3–1)
MONOCYTES NFR BLD: 12.3 % (ref 4–15)
NEUTROPHILS # BLD AUTO: 3.5 K/UL (ref 1.8–7.7)
NEUTROPHILS NFR BLD: 54.5 % (ref 38–73)
NRBC BLD-RTO: 0 /100 WBC
PLATELET # BLD AUTO: 258 K/UL (ref 150–350)
PMV BLD AUTO: 10.6 FL (ref 9.2–12.9)
POTASSIUM SERPL-SCNC: 4 MMOL/L (ref 3.5–5.1)
PROT SERPL-MCNC: 6.5 G/DL (ref 6–8.4)
RBC # BLD AUTO: 2.73 M/UL (ref 4.6–6.2)
SODIUM SERPL-SCNC: 137 MMOL/L (ref 136–145)
WBC # BLD AUTO: 6.48 K/UL (ref 3.9–12.7)

## 2019-04-08 PROCEDURE — 86140 C-REACTIVE PROTEIN: CPT

## 2019-04-08 PROCEDURE — 80053 COMPREHEN METABOLIC PANEL: CPT

## 2019-04-08 PROCEDURE — 84134 ASSAY OF PREALBUMIN: CPT

## 2019-04-08 PROCEDURE — 36415 COLL VENOUS BLD VENIPUNCTURE: CPT | Mod: PO

## 2019-04-08 PROCEDURE — 85651 RBC SED RATE NONAUTOMATED: CPT | Mod: PO

## 2019-04-08 PROCEDURE — 85025 COMPLETE CBC W/AUTO DIFF WBC: CPT

## 2019-04-08 NOTE — TELEPHONE ENCOUNTER
----- Message from Soila Barros sent at 4/8/2019  3:16 PM CDT -----  Contact: Patient  Patient needs a hospital follow up appt as soon as possible preferably as late in afternoon as possible.  Please call and advise @ [phone].  Jenna/damian

## 2019-04-09 LAB — PREALB SERPL-MCNC: 23 MG/DL (ref 20–43)

## 2019-04-10 ENCOUNTER — TELEPHONE (OUTPATIENT)
Dept: FAMILY MEDICINE | Facility: CLINIC | Age: 54
End: 2019-04-10

## 2019-04-10 NOTE — TELEPHONE ENCOUNTER
----- Message from Vincent Cee sent at 4/10/2019 10:00 AM CDT -----  Contact: Pt   Pt is .Type:  Patient Returning Call    Who Called:Pt   Who Left Message for Patient:nurse   Does the patient know what this is regarding? concerning scheduling a hospital follow up appt.   Would the patient rather a call back or a response via [facility]? Call back   Best Call Back Number: [phone]         ..Thank You  Elvia Cee

## 2019-04-12 ENCOUNTER — OFFICE VISIT (OUTPATIENT)
Dept: FAMILY MEDICINE | Facility: CLINIC | Age: 54
End: 2019-04-12
Payer: COMMERCIAL

## 2019-04-12 VITALS
HEIGHT: 76 IN | TEMPERATURE: 98 F | WEIGHT: 315 LBS | HEART RATE: 59 BPM | DIASTOLIC BLOOD PRESSURE: 80 MMHG | SYSTOLIC BLOOD PRESSURE: 152 MMHG | BODY MASS INDEX: 38.36 KG/M2

## 2019-04-12 DIAGNOSIS — I10 HYPERTENSION, UNSPECIFIED TYPE: Primary | ICD-10-CM

## 2019-04-12 PROCEDURE — 99214 PR OFFICE/OUTPT VISIT, EST, LEVL IV, 30-39 MIN: ICD-10-PCS | Mod: S$GLB,,, | Performed by: FAMILY MEDICINE

## 2019-04-12 PROCEDURE — 3008F BODY MASS INDEX DOCD: CPT | Mod: CPTII,S$GLB,, | Performed by: FAMILY MEDICINE

## 2019-04-12 PROCEDURE — 99214 OFFICE O/P EST MOD 30 MIN: CPT | Mod: S$GLB,,, | Performed by: FAMILY MEDICINE

## 2019-04-12 PROCEDURE — 99999 PR PBB SHADOW E&M-EST. PATIENT-LVL III: CPT | Mod: PBBFAC,,, | Performed by: FAMILY MEDICINE

## 2019-04-12 PROCEDURE — 3077F SYST BP >= 140 MM HG: CPT | Mod: CPTII,S$GLB,, | Performed by: FAMILY MEDICINE

## 2019-04-12 PROCEDURE — 3079F PR MOST RECENT DIASTOLIC BLOOD PRESSURE 80-89 MM HG: ICD-10-PCS | Mod: CPTII,S$GLB,, | Performed by: FAMILY MEDICINE

## 2019-04-12 PROCEDURE — 3079F DIAST BP 80-89 MM HG: CPT | Mod: CPTII,S$GLB,, | Performed by: FAMILY MEDICINE

## 2019-04-12 PROCEDURE — 3008F PR BODY MASS INDEX (BMI) DOCUMENTED: ICD-10-PCS | Mod: CPTII,S$GLB,, | Performed by: FAMILY MEDICINE

## 2019-04-12 PROCEDURE — 3077F PR MOST RECENT SYSTOLIC BLOOD PRESSURE >= 140 MM HG: ICD-10-PCS | Mod: CPTII,S$GLB,, | Performed by: FAMILY MEDICINE

## 2019-04-12 PROCEDURE — 99999 PR PBB SHADOW E&M-EST. PATIENT-LVL III: ICD-10-PCS | Mod: PBBFAC,,, | Performed by: FAMILY MEDICINE

## 2019-04-12 RX ORDER — FUROSEMIDE 40 MG/1
40 TABLET ORAL 2 TIMES DAILY
Qty: 60 TABLET | Refills: 11 | Status: SHIPPED | OUTPATIENT
Start: 2019-04-12 | End: 2019-07-10 | Stop reason: ALTCHOICE

## 2019-04-12 RX ORDER — HYDRALAZINE HYDROCHLORIDE 100 MG/1
100 TABLET, FILM COATED ORAL EVERY 8 HOURS
Qty: 90 TABLET | Refills: 11 | Status: SHIPPED | OUTPATIENT
Start: 2019-04-12 | End: 2019-08-02

## 2019-04-12 RX ORDER — PYRIDOSTIGMINE BROMIDE 60 MG/1
120 TABLET ORAL 3 TIMES DAILY
Qty: 180 TABLET | Refills: 2 | Status: SHIPPED | OUTPATIENT
Start: 2019-04-12 | End: 2019-09-04 | Stop reason: SDUPTHER

## 2019-04-12 RX ORDER — CLONIDINE HYDROCHLORIDE 0.2 MG/1
0.2 TABLET ORAL 3 TIMES DAILY
COMMUNITY
Start: 2019-04-05 | End: 2019-05-05

## 2019-04-12 NOTE — PROGRESS NOTES
Subjective:      Patient ID: Leodan Dan is a 53 y.o. male.    Chief Complaint: Follow-up    HPI     He was in [facility] for pulmonary problems recently and he states that they treated him as if he had had a blood clot in the lungs but they could not study him due to his weight.  He had 4 L taken off of him and he states that dr. Bruner is considering dialysis for him due to poor kidney problems.       Discharge Summaries  - documented in this encounter    Carlos Ibarra MD - 04/05/2019 3:57 PM CDT  Formatting of this note might be different from the original.  [facility] DISCHARGE SUMMARY  Patient ID:  Leodan Dan  [id number]  53 y.o.  1965    Admit Date:   4/2/2019 5:26 PM    Discharge Date:   Discharge Today: 4/5/2019    Admitting Physician:   MD Demi    Discharge Physician:   Carlos Ibarra MD    Consults:  Consultants   Provider Service Role Specialty   Soha Bruner MD Nephrology Consulting Physician Nephrology   Nathan Rodriguez MD -- Consulting Physician Pulmonary Disease   Ayla Chakraborty MD Nephrology Consulting Physician Nephrology       Reason for Admission/Admission Diagnoses:   Present on Admission:   Dyspnea   Accelerated hypertension   Anemia of chronic renal failure, unspecified CKD stage   Elevated d-dimer   GERD (gastroesophageal reflux disease)   Myasthenia gravis associated with thymoma (HCC)   Chronic kidney disease (CKD), stage V    Discharge Diagnoses:   Active Hospital Problems   Diagnosis Date Noted    Dyspnea 04/02/2019    Chronic kidney disease (CKD), stage V 04/04/2019    Elevated d-dimer 04/02/2019    Anemia of chronic renal failure, unspecified CKD stage 04/01/2018   Chronic    GERD (gastroesophageal reflux disease) 04/01/2018   Chronic    Morbid obesity with BMI of 50.0-59.9, adult 04/01/2018   Chronic    Accelerated hypertension 05/31/2017    Myasthenia gravis associated with thymoma (HCC) 06/08/2016     Resolved Hospital Problems      History of Present Illness:     Leodan Dan is a 53 y.o. male that has a past medical history of Anemia, Arthritis, Chronic kidney disease, Encounter for blood transfusion, GERD (gastroesophageal reflux disease), Hormone disorder, MRSA infection (10/2013), Hypertension, Kidney stone, Multiple myeloma (HCC), Myasthenia gravis, Sleep apnea, and Urinary tract infection.      Leodan Dan presents to [facility] ED with complaints of   presenting with shortness of breath For over the last 2 days. SOB mainly with activity, moderately severe. He denies chest pain, any weakness to his muscles, vision changes, pain drooping eyelids, or other myasthenia gravis related symptoms. No known provoking factors, Rest makes his symptoms better. No history of PE or DVT. No recent immobilizations, no recent travels He was seen at an outside hospital today and they sent blood work and a chest x-ray and EKG, blood work showed an elevated d-dimer and because of his chronic kidney disease he was unable to get a CT PE study so he was sent here for a VQ scan.     The patient has been seen and evaluated in the emergency room, EKG showed a NSR, normal ST and T segments, CXR showed pulmonary edema    Hospital Course and Treatment:   Admission Information   Date & Time  4/2/2019 Provider  Carlos Ibarra MD Department  [facility] Telemetry [facility] Dept. Phone  [phone]         Shortness of breath/dyspnea: Outside records revealed elevated d-dimer, and patient was sent to this hospital specifically for V/Q scan, as unable to do a CTA chest due to creatinine of 4.3 (CKD stage IV). However due to weight limitations (BMI 57.1), unable to do VQ scan. Patient empirically was started on Lovenox 1 mg/kg twice a day.      However I think patient's shortness of breath is more secondary to pulmonary edema and less likely due to PE. Will not commit patient to therapeutic anticoagulation in the absence of definitive diagnosis of  VTE. Discontinued therapeutic Lovenox dose and placed him on prophylactic dose.     Acute pulmonary edema: - 7.030 L fluid balance since admission with oral Lasix 40 mg daily. Will resume home dose lasix 20 mg po bid. Patient did not need IV diuretics.     Acute renal failure superimposed on chronic kidney disease stage IV: Patient's creatinine one year ago was 3.1, currently elevated at 4.3, today stable at 4.4. Patient followed by Dr. Bruner with nephrology. Patient has good urine output, -7,030 L urine output with oral diuretics so far. Electrolytes stable.      History of myasthenia gravis: Stable with no evidence of exacerbation. Continue home dose pyridostigmine 60 mg by mouth 3 times a day.     Uncontrolled hypertension: Improved with increase of hydralazine BID to TID dosing (100 mg), added clonidine 0.2 mg po TID, continued home dose coreg 25 mg po bid and procardia 90 mg po daily. ARB held. Patient cleared for discharge by Dr. Bruner. Follow up with nephrology within one week.     Morbid obesity: BMI 57.1.    I discussed with the patient disease process and treatment.     I have personally seen and examined the patient, Leodan Dan, in a face to face encounter on the date of discharge.     He is cleared for discharge with instructions to follow up as directed.   Total time in the care and discharge planning of this patient was greater than 30 minutes.    Significant Diagnostic Studies:  Recent Imaging and Procedure Results   None       Surgical Procedures during this visit:    Patient's Condition On Discharge:   Stable    Physical Exam   Constitutional: He is oriented to person, place, and time. He appears well-developed and well-nourished. No distress.   Morbidly obese   HENT:   Head: Normocephalic and atraumatic.   Eyes: Conjunctivae are normal. No scleral icterus.   Neck: Normal range of motion. Neck supple. No thyromegaly present.   Cardiovascular: Normal rate, regular rhythm and normal heart  sounds. Exam reveals no friction rub.   No murmur heard.  Pulmonary/Chest: Effort normal and breath sounds normal. No respiratory distress. He has no wheezes. He has no rales.   Abdominal: Soft. He exhibits no distension. There is no tenderness.   Musculoskeletal: Normal range of motion. He exhibits edema (2+ edema). He exhibits no tenderness.   Neurological: He is alert and oriented to person, place, and time. No cranial nerve deficit. He exhibits normal muscle tone. Coordination normal.   Skin: Skin is warm. No rash noted. He is not diaphoretic. No erythema.   Psychiatric: He has a normal mood and affect. His behavior is normal.     Discharge Disposition:   Order for Discharge (From admission, onward)   Start Ordered   04/05/19 1138 Discharge Disposition to: Home or Self Care Once &nbsp;   Question: Discharge Disposition to Answer: Home or Self Care   Start Status   04/05/19 1138 Completed Order ID: [id number]     04/05/19 1139   04/05/19 0000 Follow-up with PCP Schedule for: 3; Days &nbsp;   Question Answer Comment   Schedule for 3   when Days     04/05/19 1139   04/05/19 0000 Follow-up with: SOHA BRUNER; Schedule for: 5; Days &nbsp;   Question Answer Comment   with SOHA BRUNER   Schedule for 5   when Days     04/05/19 1139         Discharge Orders:  Follow-up Information   Soha Bruner MD. Schedule an appointment as soon as possible for a visit in 1 week(s).   Specialty: Nephrology  Why: Call office for lab work  Contact information:  [id number] [place] [address]  [phone]        Juan Stevenson MD. Schedule an appointment as soon as possible for a visit in 3 day(s).   Specialty: Family Medicine  Contact information:  [address] [place] [address]  [phone]            Immunizations Administered for This Admission   No immunizations given this admission.         Medication List     START taking these medications   cloNIDine HCl 0.2 MG tablet  Quantity: 90  tablet  Commonly known as: CATAPRES  Take 1 tablet (0.2 mg total) by mouth 3 (three) times daily        CHANGE how you take these medications   hydrALAZINE 100 MG tablet  Quantity: 90 tablet  Commonly known as: APRESOLINE  Take 1 tablet (100 mg total) by mouth 3 (three) times daily  What changed: when to take this        CONTINUE taking these medications   acetaminophen 325 mg tablet  Commonly known as: TYLENOL  Take 2 tablets (650 mg total) by mouth every 6 (six) hours as needed for Fever.      ascorbic acid (vitamin C) 1000 MG tablet  Commonly known as: VITAMIN C  Take 1,000 mg by mouth daily      aspirin 325 MG tablet  Take 325 mg by mouth daily.      calcitriol 0.5 MCG capsule  Commonly known as: ROCALTROL  Take 0.5 mcg by mouth daily.      carvedilol 25 MG tablet  Quantity: 60 tablet  Commonly known as: COREG  Take 1 tablet (25 mg total) by mouth 2 (two) times daily.      cholecalciferol (vitamin D3) 1,000 unit tablet  Take 1,000 Units by mouth.      furosemide 20 MG tablet  Commonly known as: LASIX  Take 20 mg by mouth 2 (two) times daily.      HYDROcodone-acetaminophen 7.5-325 mg per tablet  Commonly known as: NORCO  Take 1 tablet by mouth every 6 (six) hours as needed for Pain.      IRON ORAL  Take 65 mg by mouth 2 (two) times daily.      MEN'S MULTI-VITAMIN ORAL  Take by mouth.      NIFEdipine 90 MG (OSM) 24 hr tablet  Quantity: 30 tablet  Commonly known as: PROCARDIA-XL  Take 1 tablet (90 mg total) by mouth daily      OXYGEN-AIR DELIVERY SYSTEMS MISC  by Miscellaneous route. Use as directed      promethazine 25 MG tablet  Commonly known as: PHENERGAN  Take 25 mg by mouth.      pyridostigmine 60 mg tablet  Commonly known as: MESTINON  Take 2 tablets 3-4 times daily      tamsulosin 0.4 mg Cap  Quantity: 60 capsule  Commonly known as: FLOMAX  Take 2 capsules (0.8 mg total) by mouth daily.        STOP taking these medications   losartan 25 MG tablet  Commonly known as: COZAAR          Where to Get Your  Medications     These medications were sent to [id number]  [place], [place] - [address] [place] [address]   Phone: [phone]   · cloNIDine HCl 0.2 MG tablet  · hydrALAZINE 100 MG tablet  · NIFEdipine 90 MG (OSM) 24 hr tablet      Discharge Orders   Future Labs/Procedures Expected by Expires   Activity as tolerated As directed   Diet (Select type) Renal/60Gm/No Dialysis As directed   Questions:   Diet Type: Renal/60Gm/No Dialysis   Other Restriction(s):   Liquid Consistency:   Sodium Restriction:   Fluid restriction:   Preferences:   Follow-up with PCP Schedule for: 3; Days As directed   Questions:   Schedule for: 3   when: Days   Follow-up with: MOISES GRAVES; Schedule for: 5; Days As directed   Questions:   with: MOISES GRAVES   Schedule for: 5   when: Days       - Greater than 35 minutes spent on discharge planning.    - Carlos Ibarra MD.        Electronically signed by Carlos Ibarra MD at 04/05/2019 3:57 PM CDT          Discharge Instructions  - documented in this encounter    Attachments  The following attachments cannot be sent through Care Everywhere.    Shortness of Breath Adult Easy-to-Read (English)  Phlebitis Easy-to-Read (English)  Heart-Healthy Eating Plan Easy-to-Read (English)        Medications at Time of Discharge  - documented as of this encounter    Medication Sig Dispensed Refills Start Date End Date   acetaminophen (TYLENOL) 325 mg tablet   Take 2 tablets (650 mg total) by mouth every 6 (six) hours as needed for Fever.   0 12/12/2016     ascorbic acid, vitamin C, (VITAMIN C) 1000 MG tablet   Take 1,000 mg by mouth daily   0       aspirin 325 MG tablet   Take 325 mg by mouth daily.   0       calcitriol 0.5 MCG capsule   Take 0.5 mcg by mouth daily.   0       carvedilol (COREG) 25 MG tablet   Take 1 tablet (25 mg total) by mouth 2 (two) times daily. 60 tablet   11 08/14/2017     cholecalciferol, vitamin D3, 1,000 unit  tablet   Take 1,000 Units by mouth.   0       cloNIDine HCl (CATAPRES) 0.2 MG tablet   Take 1 tablet (0.2 mg total) by mouth 3 (three) times daily 90 tablet   0 04/05/2019 05/05/2019   FERROUS SULFATE (IRON ORAL)   Take 65 mg by mouth 2 (two) times daily.   0       furosemide 20 MG tablet   Take 20 mg by mouth 2 (two) times daily.   0       hydrALAZINE (APRESOLINE) 100 MG tablet    Indications: Accelerated hypertension, Essential hypertension, Dyspnea on exertion, Other iron deficiency anemia, Multiple myeloma in remission (HCC), Stage 4 chronic kidney disease (HCC), Precordial pain Take 1 tablet (100 mg total) by mouth 3 (three) times daily 90 tablet   2 04/05/2019 07/04/2019   HYDROcodone-acetaminophen (NORCO) 7.5-325 mg per tablet   Take 1 tablet by mouth every 6 (six) hours as needed for Pain.   0       MEN'S MULTI-VITAMIN ORAL   Take by mouth.   0       NIFEdipine (PROCARDIA-XL) 90 MG (OSM) 24 hr tablet   Take 1 tablet (90 mg total) by mouth daily 30 tablet   2 04/05/2019 07/04/2019   OXYGEN-AIR DELIVERY SYSTEMS MISC    Indications: CPAP at night at home by Miscellaneous route. Use as directed   0       promethazine 25 MG tablet   Take 25 mg by mouth.   0 08/16/2017     pyridostigmine (MESTINON) 60 mg tablet   Take 2 tablets 3-4 times daily   0 02/23/2016     tamsulosin (FLOMAX) 0.4 mg Cp24   Take 2 capsules (0.8 mg total) by mouth daily. 60 capsule   0 07/26/2017       Ordered Prescriptions  - documented in this encounter    Prescription Sig Dispensed Refills Start Date End Date   cloNIDine HCl (CATAPRES) 0.2 MG tablet   Take 1 tablet (0.2 mg total) by mouth 3 (three) times daily 90 tablet   0 04/05/2019 05/05/2019   hydrALAZINE (APRESOLINE) 100 MG tablet    Indications: Accelerated hypertension, Essential hypertension, Dyspnea on exertion, Other iron deficiency anemia, Multiple myeloma in remission (HCC), Stage 4 chronic kidney disease (HCC), Precordial pain Take 1 tablet (100 mg total) by mouth 3 (three)  "times daily 90 tablet   2 04/05/2019 07/04/2019   NIFEdipine (PROCARDIA-XL) 90 MG (OSM) 24 hr tablet   Take 1 tablet (90 mg total) by mouth daily 30 tablet   2 04/05/2019 07/04/2019     Discharge Disposition  - documented in this encounter    Disposition Code Departure Means Destination   Home or Self Care     Home       Review of Systems   Constitutional: Positive for activity change and fatigue. Negative for chills and fever.   Respiratory: Positive for shortness of breath. Negative for cough and wheezing.    Cardiovascular: Positive for chest pain. Negative for palpitations and leg swelling.   Gastrointestinal: Positive for abdominal pain.       Objective:   BP (!) 152/80   Pulse (!) 59   Temp 98.4 °F (36.9 °C) (Oral)   Ht 6' 4" (1.93 m)   Wt (!) 225 kg (496 lb)   BMI 60.38 kg/m²     Physical Exam   Constitutional: He appears well-developed and well-nourished.   Cardiovascular: Normal rate, regular rhythm and normal heart sounds. Exam reveals no gallop and no friction rub.   No murmur heard.  Pulmonary/Chest: Effort normal and breath sounds normal. No respiratory distress. He has no wheezes. He has no rales.   Musculoskeletal: He exhibits no edema.       Assessment:     1. Hypertension, unspecified type        Plan:   Leodan was seen today for follow-up.    Diagnoses and all orders for this visit:    Hypertension, unspecified type  -     hydrALAZINE (APRESOLINE) 100 MG tablet; Take 1 tablet (100 mg total) by mouth every 8 (eight) hours.  -     furosemide (LASIX) 40 MG tablet; Take 1 tablet (40 mg total) by mouth 2 (two) times daily.    Other orders  -     pyridostigmine (MESTINON) 60 mg Tab; Take 2 tablets (120 mg total) by mouth 3 (three) times daily.          "

## 2019-04-17 ENCOUNTER — LAB VISIT (OUTPATIENT)
Dept: LAB | Facility: HOSPITAL | Age: 54
End: 2019-04-17
Attending: SPECIALIST
Payer: COMMERCIAL

## 2019-04-17 DIAGNOSIS — N25.81 SECONDARY HYPERPARATHYROIDISM OF RENAL ORIGIN: ICD-10-CM

## 2019-04-17 DIAGNOSIS — N18.4 CHRONIC KIDNEY DISEASE, STAGE IV (SEVERE): Primary | ICD-10-CM

## 2019-04-17 DIAGNOSIS — D63.1 ANEMIA OF CHRONIC RENAL FAILURE: ICD-10-CM

## 2019-04-17 DIAGNOSIS — Z79.899 ENCOUNTER FOR LONG-TERM (CURRENT) USE OF MEDICATIONS: ICD-10-CM

## 2019-04-17 DIAGNOSIS — R80.9 PROTEINURIA: ICD-10-CM

## 2019-04-17 DIAGNOSIS — N18.9 ANEMIA OF CHRONIC RENAL FAILURE: ICD-10-CM

## 2019-04-17 DIAGNOSIS — E61.1 IRON DEFICIENCY: ICD-10-CM

## 2019-04-17 LAB
ALBUMIN SERPL BCP-MCNC: 3.3 G/DL (ref 3.5–5.2)
ANION GAP SERPL CALC-SCNC: 10 MMOL/L (ref 8–16)
BACTERIA #/AREA URNS HPF: NORMAL /HPF
BASOPHILS # BLD AUTO: 0.04 K/UL (ref 0–0.2)
BASOPHILS NFR BLD: 0.5 % (ref 0–1.9)
BILIRUB UR QL STRIP: NEGATIVE
BUN SERPL-MCNC: 59 MG/DL (ref 6–20)
CALCIUM SERPL-MCNC: 9.4 MG/DL (ref 8.7–10.5)
CHLORIDE SERPL-SCNC: 107 MMOL/L (ref 95–110)
CLARITY UR: CLEAR
CO2 SERPL-SCNC: 24 MMOL/L (ref 23–29)
COLOR UR: YELLOW
CREAT SERPL-MCNC: 5.3 MG/DL (ref 0.5–1.4)
CREAT UR-MCNC: 92 MG/DL (ref 23–375)
DIFFERENTIAL METHOD: ABNORMAL
EOSINOPHIL # BLD AUTO: 0.3 K/UL (ref 0–0.5)
EOSINOPHIL NFR BLD: 4.3 % (ref 0–8)
ERYTHROCYTE [DISTWIDTH] IN BLOOD BY AUTOMATED COUNT: 14.1 % (ref 11.5–14.5)
EST. GFR  (AFRICAN AMERICAN): 13 ML/MIN/1.73 M^2
EST. GFR  (NON AFRICAN AMERICAN): 11.2 ML/MIN/1.73 M^2
GLUCOSE SERPL-MCNC: 119 MG/DL (ref 70–110)
GLUCOSE UR QL STRIP: NEGATIVE
HCT VFR BLD AUTO: 27 % (ref 40–54)
HGB BLD-MCNC: 8.5 G/DL (ref 14–18)
HGB UR QL STRIP: NEGATIVE
HYALINE CASTS #/AREA URNS LPF: 0 /LPF
IMM GRANULOCYTES # BLD AUTO: 0.03 K/UL (ref 0–0.04)
IMM GRANULOCYTES NFR BLD AUTO: 0.4 % (ref 0–0.5)
IRON SERPL-MCNC: 42 UG/DL (ref 45–160)
KETONES UR QL STRIP: NEGATIVE
LEUKOCYTE ESTERASE UR QL STRIP: NEGATIVE
LYMPHOCYTES # BLD AUTO: 1.6 K/UL (ref 1–4.8)
LYMPHOCYTES NFR BLD: 21.9 % (ref 18–48)
MCH RBC QN AUTO: 30.7 PG (ref 27–31)
MCHC RBC AUTO-ENTMCNC: 31.5 G/DL (ref 32–36)
MCV RBC AUTO: 98 FL (ref 82–98)
MICROSCOPIC COMMENT: NORMAL
MONOCYTES # BLD AUTO: 0.9 K/UL (ref 0.3–1)
MONOCYTES NFR BLD: 11.8 % (ref 4–15)
NEUTROPHILS # BLD AUTO: 4.5 K/UL (ref 1.8–7.7)
NEUTROPHILS NFR BLD: 61.1 % (ref 38–73)
NITRITE UR QL STRIP: NEGATIVE
NRBC BLD-RTO: 0 /100 WBC
PH UR STRIP: 6 [PH] (ref 5–8)
PHOSPHATE SERPL-MCNC: 4.6 MG/DL (ref 2.7–4.5)
PLATELET # BLD AUTO: 243 K/UL (ref 150–350)
PMV BLD AUTO: 11.1 FL (ref 9.2–12.9)
POTASSIUM SERPL-SCNC: 4.4 MMOL/L (ref 3.5–5.1)
PROT UR QL STRIP: ABNORMAL
PROT UR-MCNC: 316 MG/DL (ref 0–15)
PROT/CREAT UR: 3.43 MG/G{CREAT} (ref 0–0.2)
PTH-INTACT SERPL-MCNC: 505 PG/ML (ref 9–77)
RBC # BLD AUTO: 2.77 M/UL (ref 4.6–6.2)
RBC #/AREA URNS HPF: 0 /HPF (ref 0–4)
SATURATED IRON: 16 % (ref 20–50)
SODIUM SERPL-SCNC: 141 MMOL/L (ref 136–145)
SP GR UR STRIP: 1.02 (ref 1–1.03)
SQUAMOUS #/AREA URNS HPF: NORMAL /HPF
TOTAL IRON BINDING CAPACITY: 266 UG/DL (ref 250–450)
TRANSFERRIN SERPL-MCNC: 180 MG/DL (ref 200–375)
URATE SERPL-MCNC: 8.8 MG/DL (ref 3.4–7)
URN SPEC COLLECT METH UR: ABNORMAL
WBC # BLD AUTO: 7.39 K/UL (ref 3.9–12.7)
WBC #/AREA URNS HPF: 1 /HPF (ref 0–5)

## 2019-04-17 PROCEDURE — 84550 ASSAY OF BLOOD/URIC ACID: CPT

## 2019-04-17 PROCEDURE — 85025 COMPLETE CBC W/AUTO DIFF WBC: CPT

## 2019-04-17 PROCEDURE — 80069 RENAL FUNCTION PANEL: CPT

## 2019-04-17 PROCEDURE — 36415 COLL VENOUS BLD VENIPUNCTURE: CPT | Mod: PO

## 2019-04-17 PROCEDURE — 83970 ASSAY OF PARATHORMONE: CPT

## 2019-04-17 PROCEDURE — 83540 ASSAY OF IRON: CPT

## 2019-04-17 PROCEDURE — 81000 URINALYSIS NONAUTO W/SCOPE: CPT | Mod: PO

## 2019-04-17 PROCEDURE — 84156 ASSAY OF PROTEIN URINE: CPT

## 2019-04-26 ENCOUNTER — PATIENT OUTREACH (OUTPATIENT)
Dept: ADMINISTRATIVE | Facility: HOSPITAL | Age: 54
End: 2019-04-26

## 2019-04-26 NOTE — PROGRESS NOTES
Health Maintenance reviewed. Lipid panel order pended. Colonoscopy Linked to  for media.  updated.

## 2019-04-30 ENCOUNTER — LAB VISIT (OUTPATIENT)
Dept: LAB | Facility: HOSPITAL | Age: 54
End: 2019-04-30
Attending: SPECIALIST
Payer: COMMERCIAL

## 2019-04-30 DIAGNOSIS — N18.9 ANEMIA OF CHRONIC RENAL FAILURE: Primary | ICD-10-CM

## 2019-04-30 DIAGNOSIS — N18.5 CHRONIC KIDNEY DISEASE, STAGE V: ICD-10-CM

## 2019-04-30 DIAGNOSIS — D63.1 ANEMIA OF CHRONIC RENAL FAILURE: Primary | ICD-10-CM

## 2019-04-30 LAB
ALBUMIN SERPL BCP-MCNC: 3.2 G/DL (ref 3.5–5.2)
ANION GAP SERPL CALC-SCNC: 13 MMOL/L (ref 8–16)
BASOPHILS # BLD AUTO: 0.03 K/UL (ref 0–0.2)
BASOPHILS NFR BLD: 0.4 % (ref 0–1.9)
BUN SERPL-MCNC: 51 MG/DL (ref 6–20)
CALCIUM SERPL-MCNC: 9.1 MG/DL (ref 8.7–10.5)
CHLORIDE SERPL-SCNC: 109 MMOL/L (ref 95–110)
CO2 SERPL-SCNC: 19 MMOL/L (ref 23–29)
CREAT SERPL-MCNC: 4.8 MG/DL (ref 0.5–1.4)
DIFFERENTIAL METHOD: ABNORMAL
EOSINOPHIL # BLD AUTO: 0.3 K/UL (ref 0–0.5)
EOSINOPHIL NFR BLD: 4.2 % (ref 0–8)
ERYTHROCYTE [DISTWIDTH] IN BLOOD BY AUTOMATED COUNT: 14 % (ref 11.5–14.5)
EST. GFR  (AFRICAN AMERICAN): 14.6 ML/MIN/1.73 M^2
EST. GFR  (NON AFRICAN AMERICAN): 12.7 ML/MIN/1.73 M^2
GLUCOSE SERPL-MCNC: 175 MG/DL (ref 70–110)
HCT VFR BLD AUTO: 27.3 % (ref 40–54)
HGB BLD-MCNC: 8.6 G/DL (ref 14–18)
IMM GRANULOCYTES # BLD AUTO: 0.02 K/UL (ref 0–0.04)
IMM GRANULOCYTES NFR BLD AUTO: 0.3 % (ref 0–0.5)
LYMPHOCYTES # BLD AUTO: 1.6 K/UL (ref 1–4.8)
LYMPHOCYTES NFR BLD: 22 % (ref 18–48)
MCH RBC QN AUTO: 31.2 PG (ref 27–31)
MCHC RBC AUTO-ENTMCNC: 31.5 G/DL (ref 32–36)
MCV RBC AUTO: 99 FL (ref 82–98)
MONOCYTES # BLD AUTO: 0.8 K/UL (ref 0.3–1)
MONOCYTES NFR BLD: 10.7 % (ref 4–15)
NEUTROPHILS # BLD AUTO: 4.7 K/UL (ref 1.8–7.7)
NEUTROPHILS NFR BLD: 62.4 % (ref 38–73)
NRBC BLD-RTO: 0 /100 WBC
PHOSPHATE SERPL-MCNC: 3.9 MG/DL (ref 2.7–4.5)
PLATELET # BLD AUTO: 215 K/UL (ref 150–350)
PMV BLD AUTO: 10.9 FL (ref 9.2–12.9)
POTASSIUM SERPL-SCNC: 3.9 MMOL/L (ref 3.5–5.1)
RBC # BLD AUTO: 2.76 M/UL (ref 4.6–6.2)
SODIUM SERPL-SCNC: 141 MMOL/L (ref 136–145)
WBC # BLD AUTO: 7.45 K/UL (ref 3.9–12.7)

## 2019-04-30 PROCEDURE — 80069 RENAL FUNCTION PANEL: CPT

## 2019-04-30 PROCEDURE — 85025 COMPLETE CBC W/AUTO DIFF WBC: CPT

## 2019-04-30 PROCEDURE — 36415 COLL VENOUS BLD VENIPUNCTURE: CPT | Mod: PO

## 2019-05-03 ENCOUNTER — TELEPHONE (OUTPATIENT)
Dept: HEMATOLOGY/ONCOLOGY | Facility: CLINIC | Age: 54
End: 2019-05-03

## 2019-05-03 NOTE — TELEPHONE ENCOUNTER
Received fax from Dr. Soha Bruner with labs and office note attached.  Dr. Bruner was requesting advice from Dr. Iqbal, RE: low h/h, already on procrit 20,000 units every 2 weeks.  Dr. Iqbal reviewed records, and recommended IV Fe replacements, and to increase procrit if no response.    Faxed referral back to Dr. Bruner.  Confirmation received.

## 2019-05-14 ENCOUNTER — LAB VISIT (OUTPATIENT)
Dept: LAB | Facility: HOSPITAL | Age: 54
End: 2019-05-14
Attending: SPECIALIST
Payer: COMMERCIAL

## 2019-05-14 DIAGNOSIS — N18.5 CHRONIC KIDNEY DISEASE, STAGE V: ICD-10-CM

## 2019-05-14 DIAGNOSIS — D63.1 ANEMIA OF CHRONIC RENAL FAILURE: Primary | ICD-10-CM

## 2019-05-14 DIAGNOSIS — N18.9 ANEMIA OF CHRONIC RENAL FAILURE: Primary | ICD-10-CM

## 2019-05-14 LAB
ALBUMIN SERPL BCP-MCNC: 3.2 G/DL (ref 3.5–5.2)
ANION GAP SERPL CALC-SCNC: 14 MMOL/L (ref 8–16)
BASOPHILS # BLD AUTO: 0.05 K/UL (ref 0–0.2)
BASOPHILS NFR BLD: 0.6 % (ref 0–1.9)
BUN SERPL-MCNC: 52 MG/DL (ref 6–20)
CALCIUM SERPL-MCNC: 9.2 MG/DL (ref 8.7–10.5)
CHLORIDE SERPL-SCNC: 112 MMOL/L (ref 95–110)
CO2 SERPL-SCNC: 18 MMOL/L (ref 23–29)
CREAT SERPL-MCNC: 5 MG/DL (ref 0.5–1.4)
DIFFERENTIAL METHOD: ABNORMAL
EOSINOPHIL # BLD AUTO: 0.4 K/UL (ref 0–0.5)
EOSINOPHIL NFR BLD: 4.8 % (ref 0–8)
ERYTHROCYTE [DISTWIDTH] IN BLOOD BY AUTOMATED COUNT: 14.3 % (ref 11.5–14.5)
EST. GFR  (AFRICAN AMERICAN): 13.9 ML/MIN/1.73 M^2
EST. GFR  (NON AFRICAN AMERICAN): 12.1 ML/MIN/1.73 M^2
GLUCOSE SERPL-MCNC: 114 MG/DL (ref 70–110)
HCT VFR BLD AUTO: 29.5 % (ref 40–54)
HGB BLD-MCNC: 9.1 G/DL (ref 14–18)
IMM GRANULOCYTES # BLD AUTO: 0.03 K/UL (ref 0–0.04)
IMM GRANULOCYTES NFR BLD AUTO: 0.4 % (ref 0–0.5)
LYMPHOCYTES # BLD AUTO: 1.8 K/UL (ref 1–4.8)
LYMPHOCYTES NFR BLD: 23.1 % (ref 18–48)
MCH RBC QN AUTO: 30.1 PG (ref 27–31)
MCHC RBC AUTO-ENTMCNC: 30.8 G/DL (ref 32–36)
MCV RBC AUTO: 98 FL (ref 82–98)
MONOCYTES # BLD AUTO: 0.8 K/UL (ref 0.3–1)
MONOCYTES NFR BLD: 10 % (ref 4–15)
NEUTROPHILS # BLD AUTO: 4.7 K/UL (ref 1.8–7.7)
NEUTROPHILS NFR BLD: 61.1 % (ref 38–73)
NRBC BLD-RTO: 0 /100 WBC
PHOSPHATE SERPL-MCNC: 4.5 MG/DL (ref 2.7–4.5)
PLATELET # BLD AUTO: 253 K/UL (ref 150–350)
PMV BLD AUTO: 10.7 FL (ref 9.2–12.9)
POTASSIUM SERPL-SCNC: 4.3 MMOL/L (ref 3.5–5.1)
RBC # BLD AUTO: 3.02 M/UL (ref 4.6–6.2)
SODIUM SERPL-SCNC: 144 MMOL/L (ref 136–145)
WBC # BLD AUTO: 7.7 K/UL (ref 3.9–12.7)

## 2019-05-14 PROCEDURE — 80069 RENAL FUNCTION PANEL: CPT

## 2019-05-14 PROCEDURE — 36415 COLL VENOUS BLD VENIPUNCTURE: CPT | Mod: PO

## 2019-05-14 PROCEDURE — 85025 COMPLETE CBC W/AUTO DIFF WBC: CPT

## 2019-05-27 ENCOUNTER — OFFICE VISIT (OUTPATIENT)
Dept: FAMILY MEDICINE | Facility: CLINIC | Age: 54
End: 2019-05-27
Payer: COMMERCIAL

## 2019-05-27 ENCOUNTER — LAB VISIT (OUTPATIENT)
Dept: LAB | Facility: HOSPITAL | Age: 54
End: 2019-05-27
Attending: SPECIALIST
Payer: COMMERCIAL

## 2019-05-27 VITALS
WEIGHT: 315 LBS | SYSTOLIC BLOOD PRESSURE: 155 MMHG | HEART RATE: 65 BPM | DIASTOLIC BLOOD PRESSURE: 80 MMHG | TEMPERATURE: 98 F | HEIGHT: 76 IN | BODY MASS INDEX: 38.36 KG/M2

## 2019-05-27 DIAGNOSIS — N18.9 ANEMIA OF CHRONIC RENAL FAILURE: Primary | ICD-10-CM

## 2019-05-27 DIAGNOSIS — I10 HYPERTENSION, UNSPECIFIED TYPE: Primary | ICD-10-CM

## 2019-05-27 DIAGNOSIS — D63.1 ANEMIA OF CHRONIC RENAL FAILURE: Primary | ICD-10-CM

## 2019-05-27 DIAGNOSIS — N18.5 CHRONIC KIDNEY DISEASE, STAGE V: ICD-10-CM

## 2019-05-27 LAB
ALBUMIN SERPL BCP-MCNC: 3.1 G/DL (ref 3.5–5.2)
ANION GAP SERPL CALC-SCNC: 11 MMOL/L (ref 8–16)
BASOPHILS # BLD AUTO: 0.05 K/UL (ref 0–0.2)
BASOPHILS NFR BLD: 0.6 % (ref 0–1.9)
BUN SERPL-MCNC: 60 MG/DL (ref 6–20)
CALCIUM SERPL-MCNC: 9.5 MG/DL (ref 8.7–10.5)
CHLORIDE SERPL-SCNC: 109 MMOL/L (ref 95–110)
CO2 SERPL-SCNC: 21 MMOL/L (ref 23–29)
CREAT SERPL-MCNC: 5.2 MG/DL (ref 0.5–1.4)
DIFFERENTIAL METHOD: ABNORMAL
EOSINOPHIL # BLD AUTO: 0.4 K/UL (ref 0–0.5)
EOSINOPHIL NFR BLD: 4.6 % (ref 0–8)
ERYTHROCYTE [DISTWIDTH] IN BLOOD BY AUTOMATED COUNT: 14 % (ref 11.5–14.5)
EST. GFR  (AFRICAN AMERICAN): 13.3 ML/MIN/1.73 M^2
EST. GFR  (NON AFRICAN AMERICAN): 11.5 ML/MIN/1.73 M^2
GLUCOSE SERPL-MCNC: 196 MG/DL (ref 70–110)
HCT VFR BLD AUTO: 28.2 % (ref 40–54)
HGB BLD-MCNC: 8.7 G/DL (ref 14–18)
IMM GRANULOCYTES # BLD AUTO: 0.08 K/UL (ref 0–0.04)
IMM GRANULOCYTES NFR BLD AUTO: 0.9 % (ref 0–0.5)
LYMPHOCYTES # BLD AUTO: 1.8 K/UL (ref 1–4.8)
LYMPHOCYTES NFR BLD: 21.4 % (ref 18–48)
MCH RBC QN AUTO: 30.3 PG (ref 27–31)
MCHC RBC AUTO-ENTMCNC: 30.9 G/DL (ref 32–36)
MCV RBC AUTO: 98 FL (ref 82–98)
MONOCYTES # BLD AUTO: 0.7 K/UL (ref 0.3–1)
MONOCYTES NFR BLD: 8.3 % (ref 4–15)
NEUTROPHILS # BLD AUTO: 5.4 K/UL (ref 1.8–7.7)
NEUTROPHILS NFR BLD: 64.2 % (ref 38–73)
NRBC BLD-RTO: 0 /100 WBC
PHOSPHATE SERPL-MCNC: 4.6 MG/DL (ref 2.7–4.5)
PLATELET # BLD AUTO: 232 K/UL (ref 150–350)
PMV BLD AUTO: 10.9 FL (ref 9.2–12.9)
POTASSIUM SERPL-SCNC: 3.9 MMOL/L (ref 3.5–5.1)
RBC # BLD AUTO: 2.87 M/UL (ref 4.6–6.2)
SODIUM SERPL-SCNC: 141 MMOL/L (ref 136–145)
WBC # BLD AUTO: 8.46 K/UL (ref 3.9–12.7)

## 2019-05-27 PROCEDURE — 99213 OFFICE O/P EST LOW 20 MIN: CPT | Mod: S$GLB,,, | Performed by: NURSE PRACTITIONER

## 2019-05-27 PROCEDURE — 80069 RENAL FUNCTION PANEL: CPT

## 2019-05-27 PROCEDURE — 3079F PR MOST RECENT DIASTOLIC BLOOD PRESSURE 80-89 MM HG: ICD-10-PCS | Mod: CPTII,S$GLB,, | Performed by: NURSE PRACTITIONER

## 2019-05-27 PROCEDURE — 99999 PR PBB SHADOW E&M-EST. PATIENT-LVL III: CPT | Mod: PBBFAC,,, | Performed by: NURSE PRACTITIONER

## 2019-05-27 PROCEDURE — 3077F PR MOST RECENT SYSTOLIC BLOOD PRESSURE >= 140 MM HG: ICD-10-PCS | Mod: CPTII,S$GLB,, | Performed by: NURSE PRACTITIONER

## 2019-05-27 PROCEDURE — 85025 COMPLETE CBC W/AUTO DIFF WBC: CPT

## 2019-05-27 PROCEDURE — 99213 PR OFFICE/OUTPT VISIT, EST, LEVL III, 20-29 MIN: ICD-10-PCS | Mod: S$GLB,,, | Performed by: NURSE PRACTITIONER

## 2019-05-27 PROCEDURE — 3077F SYST BP >= 140 MM HG: CPT | Mod: CPTII,S$GLB,, | Performed by: NURSE PRACTITIONER

## 2019-05-27 PROCEDURE — 36415 COLL VENOUS BLD VENIPUNCTURE: CPT | Mod: PO

## 2019-05-27 PROCEDURE — 3008F BODY MASS INDEX DOCD: CPT | Mod: CPTII,S$GLB,, | Performed by: NURSE PRACTITIONER

## 2019-05-27 PROCEDURE — 3008F PR BODY MASS INDEX (BMI) DOCUMENTED: ICD-10-PCS | Mod: CPTII,S$GLB,, | Performed by: NURSE PRACTITIONER

## 2019-05-27 PROCEDURE — 3079F DIAST BP 80-89 MM HG: CPT | Mod: CPTII,S$GLB,, | Performed by: NURSE PRACTITIONER

## 2019-05-27 PROCEDURE — 99999 PR PBB SHADOW E&M-EST. PATIENT-LVL III: ICD-10-PCS | Mod: PBBFAC,,, | Performed by: NURSE PRACTITIONER

## 2019-05-27 RX ORDER — CLONIDINE HYDROCHLORIDE 0.2 MG/1
TABLET ORAL
Refills: 0 | COMMUNITY
Start: 2019-05-24 | End: 2019-05-27 | Stop reason: ALTCHOICE

## 2019-05-27 RX ORDER — VALSARTAN 160 MG/1
160 TABLET ORAL DAILY
Qty: 30 TABLET | Refills: 1 | Status: SHIPPED | OUTPATIENT
Start: 2019-05-27 | End: 2019-06-14

## 2019-05-27 NOTE — PROGRESS NOTES
Subjective:       Patient ID: Leodan Dan is a 53 y.o. male.    Chief Complaint: Medication Refill    Hypertension   This is a chronic problem. The current episode started more than 1 year ago. The problem has been waxing and waning since onset. The problem is uncontrolled (seen in ER at [place]). Pertinent negatives include no chest pain, headaches, palpitations or shortness of breath. Risk factors for coronary artery disease include obesity and male gender. Past treatments include beta blockers, diuretics and direct vasodilators. Compliance problems include diet, medication side effects and exercise (unable to tolerate Clonidine- causes drowsiness, fatigue, dizziness).        Review of Systems   Constitutional: Negative for fatigue, fever and unexpected weight change.   HENT: Negative for ear pain and sore throat.    Eyes: Negative.  Negative for pain and visual disturbance.   Respiratory: Negative for cough and shortness of breath.    Cardiovascular: Negative for chest pain and palpitations.   Gastrointestinal: Negative for abdominal pain, diarrhea, nausea and vomiting.   Genitourinary: Negative for dysuria and frequency.   Musculoskeletal: Negative for arthralgias and myalgias.   Skin: Negative for color change and rash.   Neurological: Negative for dizziness and headaches.   Psychiatric/Behavioral: Negative for sleep disturbance. The patient is not nervous/anxious.        Vitals:    05/27/19 0850   BP: (!) 155/80   Pulse:    Temp:        Objective:     Current Outpatient Medications   Medication Sig Dispense Refill    ascorbic acid (VITAMIN C) 1000 MG tablet Take 1,000 mg by mouth once daily.      aspirin 325 MG tablet Take 325 mg by mouth 2 (two) times daily.       calcitRIOL (ROCALTROL) 0.5 MCG Cap Take 0.5 mcg by mouth.      carvedilol (COREG) 25 MG tablet Take 1 tablet (25 mg total) by mouth 2 (two) times daily. 180 tablet 3    ferrous sulfate 134 mg (27 mg iron) Tab Take 65 mg by mouth once daily.        furosemide (LASIX) 40 MG tablet Take 1 tablet (40 mg total) by mouth 2 (two) times daily. 60 tablet 11    hydrALAZINE (APRESOLINE) 100 MG tablet Take 1 tablet (100 mg total) by mouth every 8 (eight) hours. 90 tablet 11    MEN'S MULTI-VITAMIN ORAL Take by mouth.      nifedipine (PROCARDIA-XL) 90 MG (OSM) 24 hr tablet Take 90 mg by mouth.      pyridostigmine (MESTINON) 60 mg Tab Take 2 tablets (120 mg total) by mouth 3 (three) times daily. 180 tablet 2    valsartan (DIOVAN) 160 MG tablet Take 1 tablet (160 mg total) by mouth once daily. 30 tablet 1     No current facility-administered medications for this visit.        Physical Exam   Constitutional: He is oriented to person, place, and time. He appears well-developed. No distress.   HENT:   Head: Normocephalic and atraumatic.   Eyes: Pupils are equal, round, and reactive to light. EOM are normal.   Neck: Normal range of motion. Neck supple.   Cardiovascular: Normal rate and regular rhythm.   Pulmonary/Chest: Effort normal and breath sounds normal.   Musculoskeletal: Normal range of motion.   Neurological: He is alert and oriented to person, place, and time.   Skin: Skin is warm and dry. No rash noted.   Psychiatric: He has a normal mood and affect. Thought content normal.   Nursing note and vitals reviewed.      Assessment:       1. Hypertension, unspecified type        Plan:   Hypertension, unspecified type    Other orders  -     valsartan (DIOVAN) 160 MG tablet; Take 1 tablet (160 mg total) by mouth once daily.  Dispense: 30 tablet; Refill: 1    discontinue Clonidine due to side effects  Continue     No follow-ups on file.    There are no Patient Instructions on file for this visit.

## 2019-06-12 ENCOUNTER — LAB VISIT (OUTPATIENT)
Dept: LAB | Facility: HOSPITAL | Age: 54
End: 2019-06-12
Attending: SPECIALIST
Payer: COMMERCIAL

## 2019-06-12 DIAGNOSIS — D63.1 ANEMIA OF CHRONIC RENAL FAILURE: Primary | ICD-10-CM

## 2019-06-12 DIAGNOSIS — N18.5 CHRONIC KIDNEY DISEASE, STAGE V: ICD-10-CM

## 2019-06-12 DIAGNOSIS — N18.9 ANEMIA OF CHRONIC RENAL FAILURE: Primary | ICD-10-CM

## 2019-06-12 LAB
BASOPHILS # BLD AUTO: 0.05 K/UL (ref 0–0.2)
BASOPHILS NFR BLD: 0.5 % (ref 0–1.9)
DIFFERENTIAL METHOD: ABNORMAL
EOSINOPHIL # BLD AUTO: 0.4 K/UL (ref 0–0.5)
EOSINOPHIL NFR BLD: 4.1 % (ref 0–8)
ERYTHROCYTE [DISTWIDTH] IN BLOOD BY AUTOMATED COUNT: 14.4 % (ref 11.5–14.5)
HCT VFR BLD AUTO: 30.5 % (ref 40–54)
HGB BLD-MCNC: 9.4 G/DL (ref 14–18)
IMM GRANULOCYTES # BLD AUTO: 0.03 K/UL (ref 0–0.04)
IMM GRANULOCYTES NFR BLD AUTO: 0.3 % (ref 0–0.5)
LYMPHOCYTES # BLD AUTO: 2.1 K/UL (ref 1–4.8)
LYMPHOCYTES NFR BLD: 22.9 % (ref 18–48)
MCH RBC QN AUTO: 31.5 PG (ref 27–31)
MCHC RBC AUTO-ENTMCNC: 30.8 G/DL (ref 32–36)
MCV RBC AUTO: 102 FL (ref 82–98)
MONOCYTES # BLD AUTO: 1.1 K/UL (ref 0.3–1)
MONOCYTES NFR BLD: 11.5 % (ref 4–15)
NEUTROPHILS # BLD AUTO: 5.5 K/UL (ref 1.8–7.7)
NEUTROPHILS NFR BLD: 60.7 % (ref 38–73)
NRBC BLD-RTO: 0 /100 WBC
PLATELET # BLD AUTO: 328 K/UL (ref 150–350)
PMV BLD AUTO: 10.5 FL (ref 9.2–12.9)
RBC # BLD AUTO: 2.98 M/UL (ref 4.6–6.2)
WBC # BLD AUTO: 9.11 K/UL (ref 3.9–12.7)

## 2019-06-12 PROCEDURE — 85025 COMPLETE CBC W/AUTO DIFF WBC: CPT

## 2019-06-12 PROCEDURE — 36415 COLL VENOUS BLD VENIPUNCTURE: CPT | Mod: PO

## 2019-06-12 PROCEDURE — 80069 RENAL FUNCTION PANEL: CPT

## 2019-06-13 LAB
ALBUMIN SERPL BCP-MCNC: 3.2 G/DL (ref 3.5–5.2)
ANION GAP SERPL CALC-SCNC: 14 MMOL/L (ref 8–16)
BUN SERPL-MCNC: 71 MG/DL (ref 6–20)
CALCIUM SERPL-MCNC: 9 MG/DL (ref 8.7–10.5)
CHLORIDE SERPL-SCNC: 106 MMOL/L (ref 95–110)
CO2 SERPL-SCNC: 20 MMOL/L (ref 23–29)
CREAT SERPL-MCNC: 6.3 MG/DL (ref 0.5–1.4)
EST. GFR  (AFRICAN AMERICAN): 10.5 ML/MIN/1.73 M^2
EST. GFR  (NON AFRICAN AMERICAN): 9.1 ML/MIN/1.73 M^2
GLUCOSE SERPL-MCNC: 133 MG/DL (ref 70–110)
PHOSPHATE SERPL-MCNC: 5.7 MG/DL (ref 2.7–4.5)
POTASSIUM SERPL-SCNC: 4.4 MMOL/L (ref 3.5–5.1)
SODIUM SERPL-SCNC: 140 MMOL/L (ref 136–145)

## 2019-06-14 ENCOUNTER — OFFICE VISIT (OUTPATIENT)
Dept: FAMILY MEDICINE | Facility: CLINIC | Age: 54
End: 2019-06-14
Payer: COMMERCIAL

## 2019-06-14 VITALS
WEIGHT: 315 LBS | HEART RATE: 69 BPM | TEMPERATURE: 98 F | SYSTOLIC BLOOD PRESSURE: 152 MMHG | BODY MASS INDEX: 38.36 KG/M2 | DIASTOLIC BLOOD PRESSURE: 72 MMHG | HEIGHT: 76 IN

## 2019-06-14 DIAGNOSIS — I10 ESSENTIAL HYPERTENSION: ICD-10-CM

## 2019-06-14 DIAGNOSIS — L03.115 CELLULITIS OF RIGHT LEG: ICD-10-CM

## 2019-06-14 DIAGNOSIS — Z23 IMMUNIZATION DUE: ICD-10-CM

## 2019-06-14 DIAGNOSIS — Z09 HOSPITAL DISCHARGE FOLLOW-UP: Primary | ICD-10-CM

## 2019-06-14 PROCEDURE — 3077F SYST BP >= 140 MM HG: CPT | Mod: CPTII,S$GLB,, | Performed by: NURSE PRACTITIONER

## 2019-06-14 PROCEDURE — 3077F PR MOST RECENT SYSTOLIC BLOOD PRESSURE >= 140 MM HG: ICD-10-PCS | Mod: CPTII,S$GLB,, | Performed by: NURSE PRACTITIONER

## 2019-06-14 PROCEDURE — 3008F BODY MASS INDEX DOCD: CPT | Mod: CPTII,S$GLB,, | Performed by: NURSE PRACTITIONER

## 2019-06-14 PROCEDURE — 3078F PR MOST RECENT DIASTOLIC BLOOD PRESSURE < 80 MM HG: ICD-10-PCS | Mod: CPTII,S$GLB,, | Performed by: NURSE PRACTITIONER

## 2019-06-14 PROCEDURE — 90471 IMMUNIZATION ADMIN: CPT | Mod: S$GLB,,, | Performed by: NURSE PRACTITIONER

## 2019-06-14 PROCEDURE — 90670 PNEUMOCOCCAL CONJUGATE VACCINE 13-VALENT LESS THAN 5YO & GREATER THAN: ICD-10-PCS | Mod: S$GLB,,, | Performed by: NURSE PRACTITIONER

## 2019-06-14 PROCEDURE — 99999 PR PBB SHADOW E&M-EST. PATIENT-LVL IV: CPT | Mod: PBBFAC,,, | Performed by: NURSE PRACTITIONER

## 2019-06-14 PROCEDURE — 3078F DIAST BP <80 MM HG: CPT | Mod: CPTII,S$GLB,, | Performed by: NURSE PRACTITIONER

## 2019-06-14 PROCEDURE — 99214 OFFICE O/P EST MOD 30 MIN: CPT | Mod: 25,S$GLB,, | Performed by: NURSE PRACTITIONER

## 2019-06-14 PROCEDURE — 3008F PR BODY MASS INDEX (BMI) DOCUMENTED: ICD-10-PCS | Mod: CPTII,S$GLB,, | Performed by: NURSE PRACTITIONER

## 2019-06-14 PROCEDURE — 90670 PCV13 VACCINE IM: CPT | Mod: S$GLB,,, | Performed by: NURSE PRACTITIONER

## 2019-06-14 PROCEDURE — 99214 PR OFFICE/OUTPT VISIT, EST, LEVL IV, 30-39 MIN: ICD-10-PCS | Mod: 25,S$GLB,, | Performed by: NURSE PRACTITIONER

## 2019-06-14 PROCEDURE — 90471 PNEUMOCOCCAL CONJUGATE VACCINE 13-VALENT LESS THAN 5YO & GREATER THAN: ICD-10-PCS | Mod: S$GLB,,, | Performed by: NURSE PRACTITIONER

## 2019-06-14 PROCEDURE — 99999 PR PBB SHADOW E&M-EST. PATIENT-LVL IV: ICD-10-PCS | Mod: PBBFAC,,, | Performed by: NURSE PRACTITIONER

## 2019-06-14 RX ORDER — MINOXIDIL 2.5 MG/1
5 TABLET ORAL NIGHTLY
Refills: 0 | COMMUNITY
Start: 2019-06-08 | End: 2019-07-10 | Stop reason: ALTCHOICE

## 2019-06-14 RX ORDER — SODIUM BICARBONATE 650 MG/1
1300 TABLET ORAL 2 TIMES DAILY
Refills: 0 | COMMUNITY
Start: 2019-06-08

## 2019-06-14 RX ORDER — CINACALCET 30 MG/1
TABLET, FILM COATED ORAL
Refills: 0 | COMMUNITY
Start: 2019-06-10

## 2019-06-14 RX ORDER — CEPHALEXIN 500 MG/1
CAPSULE ORAL
Refills: 0 | COMMUNITY
Start: 2019-06-08 | End: 2019-07-10 | Stop reason: ALTCHOICE

## 2019-07-10 ENCOUNTER — PATIENT OUTREACH (OUTPATIENT)
Dept: ADMINISTRATIVE | Facility: CLINIC | Age: 54
End: 2019-07-10

## 2019-07-10 RX ORDER — SEVELAMER CARBONATE 800 MG/1
2400 TABLET, FILM COATED ORAL
COMMUNITY

## 2019-07-10 RX ORDER — SUCRALFATE 1 G/10ML
1 SUSPENSION ORAL EVERY 6 HOURS
COMMUNITY

## 2019-07-10 RX ORDER — TELMISARTAN 80 MG/1
80 TABLET ORAL DAILY
COMMUNITY
End: 2020-05-13

## 2019-07-10 NOTE — PATIENT INSTRUCTIONS
Caring for Your Central Vein Access    Its important to care for your central venous access device properly. If you dont, it may become infected. Then it cant be used. The tube (catheter) will have to be removed and a new one placed in another vein. Your nurse will show you how to care for your access to help it last.  Follow these tips  · Wash your hands often.  · Try not to touch the catheter.  · Don't let anything (such as clothing) rub or pull on the catheter.  · Don't get your catheter wet.  Watching for problems  Call your healthcare provider right away if you have any of these problems:  · You see a break in the tubing.   · The skin around your access bleeds, oozes, or becomes red or sore.  · You have a fever of 100.4°F (38.0°C) or higher.  · The stitches (sutures) become loose or the catheter falls out.  · An arm becomes swollen.  Remember. . .  A central vein access is often used only for a short time. Take good care of it.      Important numbers  Healthcare provider:  Name _______________________________ Phone _______________________________  Nurse:  Name _______________________________ Phone _______________________________   Date Last Reviewed: 7/1/2016  © 8423-5040 [employer]. [address] [place] [address]. All rights reserved. This information is not intended as a substitute for professional medical care. Always follow your healthcare professional's instructions.

## 2019-07-24 PROCEDURE — G0180 MD CERTIFICATION HHA PATIENT: HCPCS | Mod: ,,, | Performed by: FAMILY MEDICINE

## 2019-07-24 PROCEDURE — G0180 PR HOME HEALTH MD CERTIFICATION: ICD-10-PCS | Mod: ,,, | Performed by: FAMILY MEDICINE

## 2019-07-25 ENCOUNTER — TELEPHONE (OUTPATIENT)
Dept: HEMATOLOGY/ONCOLOGY | Facility: CLINIC | Age: 54
End: 2019-07-25

## 2019-07-25 NOTE — TELEPHONE ENCOUNTER
Called patient and left a message to reschedule appointment due to labs not being done. No answer

## 2019-07-26 ENCOUNTER — PATIENT OUTREACH (OUTPATIENT)
Dept: ADMINISTRATIVE | Facility: CLINIC | Age: 54
End: 2019-07-26

## 2019-07-26 ENCOUNTER — LAB VISIT (OUTPATIENT)
Dept: LAB | Facility: HOSPITAL | Age: 54
End: 2019-07-26
Attending: NURSE PRACTITIONER
Payer: COMMERCIAL

## 2019-07-26 DIAGNOSIS — C90.01 MULTIPLE MYELOMA IN REMISSION: ICD-10-CM

## 2019-07-26 LAB
ALBUMIN SERPL BCP-MCNC: 3.9 G/DL (ref 3.5–5.2)
ALP SERPL-CCNC: 97 U/L (ref 55–135)
ALT SERPL W/O P-5'-P-CCNC: 32 U/L (ref 10–44)
ANION GAP SERPL CALC-SCNC: 16 MMOL/L (ref 8–16)
AST SERPL-CCNC: 55 U/L (ref 10–40)
BASOPHILS # BLD AUTO: 0.04 K/UL (ref 0–0.2)
BASOPHILS NFR BLD: 0.4 % (ref 0–1.9)
BILIRUB SERPL-MCNC: 0.5 MG/DL (ref 0.1–1)
BUN SERPL-MCNC: 36 MG/DL (ref 6–20)
CALCIUM SERPL-MCNC: 11.2 MG/DL (ref 8.7–10.5)
CHLORIDE SERPL-SCNC: 94 MMOL/L (ref 95–110)
CO2 SERPL-SCNC: 28 MMOL/L (ref 23–29)
CREAT SERPL-MCNC: 12.5 MG/DL (ref 0.5–1.4)
DIFFERENTIAL METHOD: ABNORMAL
EOSINOPHIL # BLD AUTO: 0.3 K/UL (ref 0–0.5)
EOSINOPHIL NFR BLD: 2.7 % (ref 0–8)
ERYTHROCYTE [DISTWIDTH] IN BLOOD BY AUTOMATED COUNT: 14.4 % (ref 11.5–14.5)
EST. GFR  (AFRICAN AMERICAN): 4.6 ML/MIN/1.73 M^2
EST. GFR  (NON AFRICAN AMERICAN): 4 ML/MIN/1.73 M^2
GLUCOSE SERPL-MCNC: 104 MG/DL (ref 70–110)
HCT VFR BLD AUTO: 28.7 % (ref 40–54)
HGB BLD-MCNC: 9.4 G/DL (ref 14–18)
LDH SERPL L TO P-CCNC: 201 U/L (ref 110–260)
LYMPHOCYTES # BLD AUTO: 3.3 K/UL (ref 1–4.8)
LYMPHOCYTES NFR BLD: 30.3 % (ref 18–48)
MCH RBC QN AUTO: 31.8 PG (ref 27–31)
MCHC RBC AUTO-ENTMCNC: 32.8 G/DL (ref 32–36)
MCV RBC AUTO: 97 FL (ref 82–98)
MONOCYTES # BLD AUTO: 1.5 K/UL (ref 0.3–1)
MONOCYTES NFR BLD: 13.9 % (ref 4–15)
NEUTROPHILS # BLD AUTO: 5.7 K/UL (ref 1.8–7.7)
NEUTROPHILS NFR BLD: 52.7 % (ref 38–73)
PLATELET # BLD AUTO: 316 K/UL (ref 150–350)
PMV BLD AUTO: 8.9 FL (ref 9.2–12.9)
POTASSIUM SERPL-SCNC: 4.2 MMOL/L (ref 3.5–5.1)
PROT SERPL-MCNC: 8.4 G/DL (ref 6–8.4)
RBC # BLD AUTO: 2.96 M/UL (ref 4.6–6.2)
SODIUM SERPL-SCNC: 138 MMOL/L (ref 136–145)
WBC # BLD AUTO: 10.83 K/UL (ref 3.9–12.7)

## 2019-07-26 PROCEDURE — 86334 IMMUNOFIX E-PHORESIS SERUM: CPT

## 2019-07-26 PROCEDURE — 84165 PROTEIN E-PHORESIS SERUM: CPT | Mod: 26,,, | Performed by: PATHOLOGY

## 2019-07-26 PROCEDURE — 84165 PATHOLOGIST INTERPRETATION SPE: ICD-10-PCS | Mod: 26,,, | Performed by: PATHOLOGY

## 2019-07-26 PROCEDURE — 36415 COLL VENOUS BLD VENIPUNCTURE: CPT | Mod: PO

## 2019-07-26 PROCEDURE — 85025 COMPLETE CBC W/AUTO DIFF WBC: CPT | Mod: PO

## 2019-07-26 PROCEDURE — 83615 LACTATE (LD) (LDH) ENZYME: CPT

## 2019-07-26 PROCEDURE — 82232 ASSAY OF BETA-2 PROTEIN: CPT

## 2019-07-26 PROCEDURE — 80053 COMPREHEN METABOLIC PANEL: CPT

## 2019-07-26 PROCEDURE — 83520 IMMUNOASSAY QUANT NOS NONAB: CPT | Mod: 59

## 2019-07-26 PROCEDURE — 86334 PATHOLOGIST INTERPRETATION IFE: ICD-10-PCS | Mod: 26,,, | Performed by: PATHOLOGY

## 2019-07-26 PROCEDURE — 86334 IMMUNOFIX E-PHORESIS SERUM: CPT | Mod: 26,,, | Performed by: PATHOLOGY

## 2019-07-26 PROCEDURE — 84165 PROTEIN E-PHORESIS SERUM: CPT

## 2019-07-26 NOTE — PATIENT INSTRUCTIONS
Possible Causes of Dizziness or Fainting    Dizziness and fainting can have many causes. Below are some examples of possible causes your healthcare provider will look to rule out.  Benign paroxysmal positional vertigo (BPPV)  BPPV results when calcium crystals inside the inner ear shift into the wrong position. BPPV causes episodes of vertigo (a spinning sensation). Episodes most often happen when the head is moved in a certain way. This is more common in people 65 and older.   Infection or inflammation  The semicircular canals of the ear may become infected or inflamed. In this case, they can send the wrong balance signals. This can cause vertigo.  Meniere disease  Meniere disease happens when there is too much fluid in the semicircular canals. This can cause vertigo. It also can cause hearing problems and buzzing or ringing in the ears (called tinnitus). You may also have a feeling of pressure or fullness in the ear.  Syncope  Syncope is fainting that happens when the brain doesnt get enough oxygen-rich blood. It can be caused by low heart rate or low blood pressure. This is called vasovagal syncope. It can also be caused by sitting or standing up too quickly. This is called orthostatic hypotension. Syncope may also be due to a heart valve problem, an abnormal heart rhythm, or other heart problems. Dizziness can also happen from stroke, hemorrhage in the brain, or other problems in the brain. Your healthcare provider may do certain tests to rule out these conditions.  Other causes  Other causes include:  · Medicines. Certain medicines can cause dizziness and even fainting. In some cases, stopping a medicine too quickly can lead to withdrawal symptoms, including dizziness and fainting.  · Anxiety. Being anxious can lead to breathing changes, such as hyperventilation. These can lead to dizziness and fainting.  Additional causes for dizziness and fainting also exist. Talk to your healthcare provider for more  information.     Date Last Reviewed: 10/6/2015  © 2692-1361 [employer]. [address] [place] [address], [place] [address]. All rights reserved. This information is not intended as a substitute for professional medical care. Always follow your healthcare professional's instructions.

## 2019-07-29 LAB
ALBUMIN SERPL ELPH-MCNC: 4.13 G/DL (ref 3.35–5.55)
ALPHA1 GLOB SERPL ELPH-MCNC: 0.45 G/DL (ref 0.17–0.41)
ALPHA2 GLOB SERPL ELPH-MCNC: 1.12 G/DL (ref 0.43–0.99)
B-GLOBULIN SERPL ELPH-MCNC: 0.94 G/DL (ref 0.5–1.1)
B2 MICROGLOB SERPL-MCNC: 27.5 UG/ML (ref 0–2.5)
GAMMA GLOB SERPL ELPH-MCNC: 1.15 G/DL (ref 0.67–1.58)
INTERPRETATION SERPL IFE-IMP: NORMAL
KAPPA LC SER QL IA: 12.89 MG/DL (ref 0.33–1.94)
KAPPA LC/LAMBDA SER IA: 1.03 (ref 0.26–1.65)
LAMBDA LC SER QL IA: 12.48 MG/DL (ref 0.57–2.63)
PROT SERPL-MCNC: 7.8 G/DL (ref 6–8.4)

## 2019-07-30 DIAGNOSIS — E83.52 HYPERCALCEMIA: Primary | ICD-10-CM

## 2019-07-30 LAB
PATHOLOGIST INTERPRETATION IFE: NORMAL
PATHOLOGIST INTERPRETATION SPE: NORMAL

## 2019-07-31 ENCOUNTER — OFFICE VISIT (OUTPATIENT)
Dept: HEMATOLOGY/ONCOLOGY | Facility: CLINIC | Age: 54
End: 2019-07-31
Payer: COMMERCIAL

## 2019-07-31 ENCOUNTER — TELEPHONE (OUTPATIENT)
Dept: FAMILY MEDICINE | Facility: CLINIC | Age: 54
End: 2019-07-31

## 2019-07-31 ENCOUNTER — NURSE TRIAGE (OUTPATIENT)
Dept: ADMINISTRATIVE | Facility: CLINIC | Age: 54
End: 2019-07-31

## 2019-07-31 VITALS
SYSTOLIC BLOOD PRESSURE: 142 MMHG | HEIGHT: 76 IN | DIASTOLIC BLOOD PRESSURE: 82 MMHG | BODY MASS INDEX: 55.87 KG/M2 | TEMPERATURE: 99 F | HEART RATE: 77 BPM | RESPIRATION RATE: 20 BRPM | OXYGEN SATURATION: 96 %

## 2019-07-31 DIAGNOSIS — T81.89XS NONHEALING SURGICAL WOUND, SEQUELA: ICD-10-CM

## 2019-07-31 DIAGNOSIS — Z99.2 ESRD (END STAGE RENAL DISEASE) ON DIALYSIS: ICD-10-CM

## 2019-07-31 DIAGNOSIS — C90.01 MULTIPLE MYELOMA IN REMISSION: Primary | ICD-10-CM

## 2019-07-31 DIAGNOSIS — N18.6 ESRD (END STAGE RENAL DISEASE) ON DIALYSIS: ICD-10-CM

## 2019-07-31 DIAGNOSIS — E66.01 MORBID OBESITY: ICD-10-CM

## 2019-07-31 PROCEDURE — 99999 PR PBB SHADOW E&M-EST. PATIENT-LVL IV: CPT | Mod: PBBFAC,,, | Performed by: INTERNAL MEDICINE

## 2019-07-31 PROCEDURE — 3008F BODY MASS INDEX DOCD: CPT | Mod: CPTII,S$GLB,, | Performed by: INTERNAL MEDICINE

## 2019-07-31 PROCEDURE — 99214 OFFICE O/P EST MOD 30 MIN: CPT | Mod: S$GLB,,, | Performed by: INTERNAL MEDICINE

## 2019-07-31 PROCEDURE — 99999 PR PBB SHADOW E&M-EST. PATIENT-LVL IV: ICD-10-PCS | Mod: PBBFAC,,, | Performed by: INTERNAL MEDICINE

## 2019-07-31 PROCEDURE — 99214 PR OFFICE/OUTPT VISIT, EST, LEVL IV, 30-39 MIN: ICD-10-PCS | Mod: S$GLB,,, | Performed by: INTERNAL MEDICINE

## 2019-07-31 PROCEDURE — 3008F PR BODY MASS INDEX (BMI) DOCUMENTED: ICD-10-PCS | Mod: CPTII,S$GLB,, | Performed by: INTERNAL MEDICINE

## 2019-07-31 RX ORDER — SERTRALINE HYDROCHLORIDE 50 MG/1
50 TABLET, FILM COATED ORAL DAILY
Refills: 0 | COMMUNITY
Start: 2019-07-15 | End: 2020-06-08

## 2019-07-31 RX ORDER — ATORVASTATIN CALCIUM 20 MG/1
20 TABLET, FILM COATED ORAL NIGHTLY
Refills: 0 | COMMUNITY
Start: 2019-07-15 | End: 2019-08-02

## 2019-07-31 RX ORDER — MIDODRINE HYDROCHLORIDE 10 MG/1
5 TABLET ORAL 2 TIMES DAILY
COMMUNITY
Start: 2019-07-15 | End: 2019-08-19 | Stop reason: SDUPTHER

## 2019-07-31 RX ORDER — PANTOPRAZOLE SODIUM 40 MG/1
40 TABLET, DELAYED RELEASE ORAL DAILY
Refills: 1 | COMMUNITY
Start: 2019-07-23

## 2019-07-31 RX ORDER — CHOLECALCIFEROL (VITAMIN D3) 25 MCG
1000 TABLET ORAL DAILY
COMMUNITY

## 2019-07-31 NOTE — TELEPHONE ENCOUNTER
----- Message from Kim Connelly sent at 7/31/2019  3:33 PM CDT -----  Contact: [facility]  Type:  Needs Medical Advice    Who Called: esa  Symptoms (please be specific): n/a   How long has patient had these symptoms: n/a  Pharmacy name and phone #: n/a  Would the patient rather a call back or a response via [facility]? Call back  Best Call Back Number: [phone]  Additional Information: pt is requesting to start physical therapy on next week.    Thanks,  Kim Connelly

## 2019-07-31 NOTE — TELEPHONE ENCOUNTER
Spoke with Arias at [facility] and he states that the patient is wanting to wait until next week to start his therapy due to B/P. This is just an FYI. He will evaluate patient next week when patient is ready.

## 2019-07-31 NOTE — PROGRESS NOTES
HISTORY OF PRESENT ILLNESS:  A 52-year-old white gentleman well known to me for IgG lambda  multiple myeloma, which went into remission following induction therapy with ZRD   and a protracted course of maintenance Revlimid as the patient's paraprotein   levels were undetectable for long period of time.  He was taken off all therapy   and observed.  He has remained without evidence of relapse.  His course has been   complicated by nonhealing lower extremity wounds and underlying CKD.  Since last office evaluation, the patient developed severe volume overload and the development of end-stage renal disease which required institution of emergent hemodialysis.  He has been receiving this for approximately 1 month and tolerates it poorly.  He frequently has syncopal episodes during treatments.  He remains with nonhealing lower extremity wounds.  Patient now has difficulty ambulating and intermittently uses a wheelchair.  No other pertinent findings on a 14 point review of systems.    PHYSICAL EXAMINATION:  GENERAL:  Well-developed, morbidly obese, white gentleman in no acute distress, who is ambulating with the assistance of a wheelchair.  VITAL SIGNS:  Documented in EMR and reviewed.  HEENT:  Normocephalic, atraumatic.  Oral mucosa pink and moist.  Lips without   lesions.  Tongue midline.  Oropharynx clear.  Nonicteric sclerae.   NECK:  Supple.  No adenopathy.  HEART:  Regular rate and rhythm without murmur, gallop or rub.               LUNGS:  Clear to auscultation bilaterally.                                  ABDOMEN:  Soft, nontender and nondistended with positive normoactive bowel   sounds.  No hepatosplenomegaly.  EXTREMITIES:  Bilateral lower extremity edema.  Wounds dressed/dry/intact.    LABORATORY:   White count 10.8, hemoglobin 9.4, hematocrit 28.7, platelets 316, absolute neutrophil count is 5700.  Sodium 138, potassium 4.2, chloride 94, CO2 28, BUN 36, creatinine 12.5, glucose 104, calcium 11.2, liver  function tests are within acceptable limits, , GFR 4.6.  Beta 2 microglobulin is elevated at 27.5.  Serum protein electrophoresis is negative for monoclonal paraprotein spike.  Free light chain analysis reveals increase in the free lambda light chain fraction from 4.48 to 12.48.  The kappa/lambda ratio is normal at 1.03.    IMPRESSION:  1.  IgG lambda Multiple myeloma -- r with slight increase in free light chain fraction uncertain significance.  2.  Morbid obesity.  3.  Chronic nonhealing lower extremity wounds.  4.  End-stage renal disease requiring hemodialysis.    PLAN:  1.  In light of the patient's sudden onset of renal failure, feel it important to obtain repeat bone marrow aspiration and biopsy in order to rule out progressive myeloma as the etiology of the patient's renal compromise.  Informed consent for this procedure was obtained during the course of today's office evaluation.  2.  Patient is to return to my clinic to review results of bone marrow biopsy at earliest convenience.    This note was created using voice recognition software and may contain grammatical errors.

## 2019-07-31 NOTE — TELEPHONE ENCOUNTER
Reason for Disposition   [1] Follow-up call to recent contact AND [2] information only call, no triage required     Patient states he was checking on a hemorrhoid rx. He will hortencia to his HH in the AM    Protocols used: INFORMATION ONLY CALL-A-

## 2019-07-31 NOTE — TELEPHONE ENCOUNTER
Is he having rectal bleeding that he is asking for these medications?  His last colonoscopy was in 2012.

## 2019-07-31 NOTE — TELEPHONE ENCOUNTER
----- Message from Kim Connelly sent at 7/31/2019  4:01 PM CDT -----  Contact: maria isabel [facility]-nirav  Type:  Needs Medical Advice    Who Called: nirav  Symptoms (please be specific): n/a   How long has patient had these symptoms: n/a  Pharmacy name and phone #:   [facility] [address] [place] [address] [address] [facility]  [address] [facility] [address]  Phone: [phone] Fax: [phone]  Would the patient rather a call back or a response via [facility]? Call back  Best Call Back Number: [phone]  Additional Information: requesting getting prescription for Hemroid.     Thanks,  Kim Connelly

## 2019-08-01 NOTE — TELEPHONE ENCOUNTER
Pt states that he is having rectal bleeding with his bowel movements and is requesting medication for his hemorrhoids. He states that he had a colonoscopy done at [facility] 2 weeks ago by Dr Murdock.

## 2019-08-01 NOTE — TELEPHONE ENCOUNTER
I am sorry but I would respectfully like to ask him to call Dr. Murdock.  If he did a procedure on him, he could have a post procedure bleed from something that Dr. Murdock did and Dr. Murdock would like to know that.  He should be the one that is contacted about this at this time.  Please do this today and it is ok to ask one of his partners if someone is taking call for him.

## 2019-08-02 ENCOUNTER — TELEPHONE (OUTPATIENT)
Dept: HEMATOLOGY/ONCOLOGY | Facility: CLINIC | Age: 54
End: 2019-08-02

## 2019-08-02 ENCOUNTER — OFFICE VISIT (OUTPATIENT)
Dept: FAMILY MEDICINE | Facility: CLINIC | Age: 54
End: 2019-08-02
Payer: COMMERCIAL

## 2019-08-02 VITALS
BODY MASS INDEX: 38.36 KG/M2 | HEART RATE: 81 BPM | HEIGHT: 76 IN | WEIGHT: 315 LBS | SYSTOLIC BLOOD PRESSURE: 128 MMHG | DIASTOLIC BLOOD PRESSURE: 71 MMHG | TEMPERATURE: 99 F

## 2019-08-02 DIAGNOSIS — Z09 HOSPITAL DISCHARGE FOLLOW-UP: Primary | ICD-10-CM

## 2019-08-02 DIAGNOSIS — R55 SYNCOPE, UNSPECIFIED SYNCOPE TYPE: ICD-10-CM

## 2019-08-02 PROCEDURE — 99999 PR PBB SHADOW E&M-EST. PATIENT-LVL III: ICD-10-PCS | Mod: PBBFAC,,, | Performed by: NURSE PRACTITIONER

## 2019-08-02 PROCEDURE — 99999 PR PBB SHADOW E&M-EST. PATIENT-LVL III: CPT | Mod: PBBFAC,,, | Performed by: NURSE PRACTITIONER

## 2019-08-02 PROCEDURE — 99496 TRANSJ CARE MGMT HIGH F2F 7D: CPT | Mod: S$GLB,,, | Performed by: NURSE PRACTITIONER

## 2019-08-02 PROCEDURE — 99496 TRANSITIONAL CARE MANAGE SERVICE 7 DAY DISCHARGE: ICD-10-PCS | Mod: S$GLB,,, | Performed by: NURSE PRACTITIONER

## 2019-08-02 RX ORDER — ASPIRIN 81 MG/1
81 TABLET ORAL DAILY
COMMUNITY

## 2019-08-02 NOTE — PROGRESS NOTES
Subjective:       Patient ID: Leodan Dan is a 53 y.o. male.    Chief Complaint: Follow-up    HPI     He comes into the office for hospital discharge follow-up.  He was admitted to [facility] after a syncopal episode that occurred at dialysis.  He reports that since discharge on July 27th he has not had any symptoms of weakness, dizziness, or impending syncope.  He denies shortness of breath or chest pain, no lower extremity edema.    Transitional Care Note    Family and/or Caretaker present at visit?  Yes.  Diagnostic tests reviewed/disposition: I have reviewed all completed as well as pending diagnostic tests at the time of discharge.  Disease/illness education: syncope  Home health/community services discussion/referrals: Patient does not have home health established from hospital visit.  They do not need home health.  If needed, we will set up home health for the patient.   Establishment or re-establishment of referral orders for community resources: No other necessary community resources.   Discussion with other health care providers: No discussion with other health care providers necessary.       Review of Systems   Constitutional: Negative for fatigue, fever and unexpected weight change.   HENT: Negative for ear pain and sore throat.    Eyes: Negative.  Negative for pain and visual disturbance.   Respiratory: Negative for cough and shortness of breath.    Cardiovascular: Negative for chest pain and palpitations.   Gastrointestinal: Negative for abdominal pain, diarrhea, nausea and vomiting.   Genitourinary: Negative for dysuria and frequency.   Musculoskeletal: Negative for arthralgias and myalgias.   Skin: Negative for color change and rash.   Neurological: Negative for dizziness and headaches.   Psychiatric/Behavioral: Negative for sleep disturbance. The patient is not nervous/anxious.        Vitals:    08/02/19 1042   BP: 128/71   Pulse: 81   Temp: 99 °F (37.2 °C)       Objective:     Current Outpatient  Medications   Medication Sig Dispense Refill    ascorbic acid (VITAMIN C) 1000 MG tablet Take 1,000 mg by mouth once daily.      aspirin (ECOTRIN) 81 MG EC tablet Take 81 mg by mouth once daily.      carvedilol (COREG) 25 MG tablet Take 1 tablet (25 mg total) by mouth 2 (two) times daily. 180 tablet 3    cinacalcet (SENSIPAR) 30 MG Tab TAKE 1 TABLET BY MOUTH THREE TIMES A WEEK  0    ferrous sulfate 134 mg (27 mg iron) Tab Take 65 mg by mouth once daily.       MEN'S MULTI-VITAMIN ORAL Take by mouth.      midodrine (PROAMATINE) 10 MG tablet Take 5 mg by mouth 2 (two) times daily.      pantoprazole (PROTONIX) 40 MG tablet Take 40 mg by mouth once daily.  1    pyridostigmine (MESTINON) 60 mg Tab Take 2 tablets (120 mg total) by mouth 3 (three) times daily. 180 tablet 2    sertraline (ZOLOFT) 50 MG tablet Take 50 mg by mouth once daily.  0    sevelamer carbonate (RENVELA) 800 mg Tab Take 2,400 mg by mouth 3 (three) times daily with meals.      sodium bicarbonate 650 MG tablet Take 1,300 mg by mouth 2 (two) times daily.  0    sucralfate (CARAFATE) 100 mg/mL suspension Take 1 g by mouth every 6 (six) hours.      telmisartan (MICARDIS) 80 MG Tab Take 80 mg by mouth once daily.      vitamin D (VITAMIN D3) 1000 units Tab Take 1,000 Units by mouth once daily.       No current facility-administered medications for this visit.        Physical Exam   Constitutional: He is oriented to person, place, and time. He appears well-developed. No distress.   Morbidly obese   HENT:   Head: Normocephalic and atraumatic.   Eyes: Pupils are equal, round, and reactive to light. EOM are normal.   Neck: Normal range of motion. Neck supple.   Cardiovascular: Normal rate and regular rhythm.   Pulmonary/Chest: Effort normal and breath sounds normal.   Musculoskeletal: Normal range of motion.   Neurological: He is alert and oriented to person, place, and time.   Skin: Skin is warm and dry. No rash noted.   Psychiatric: He has a normal  mood and affect. Thought content normal.   Nursing note and vitals reviewed.      Assessment:       1. Hospital discharge follow-up    2. Syncope, unspecified syncope type        Plan:   Hospital discharge follow-up    Syncope, unspecified syncope type    follow up with Dr Wilson, continue dialysis as scheduled    Follow up with Dr Murdock as scheduled    No follow-ups on file.    There are no Patient Instructions on file for this visit.

## 2019-08-02 NOTE — TELEPHONE ENCOUNTER
Attempted to contact pt regarding his BMBX procedure. Procedure is tentatively booked in the Endo dept for 8/19/19 @ 0700. Left message for a return call.

## 2019-08-05 ENCOUNTER — TELEPHONE (OUTPATIENT)
Dept: HEMATOLOGY/ONCOLOGY | Facility: CLINIC | Age: 54
End: 2019-08-05

## 2019-08-05 NOTE — TELEPHONE ENCOUNTER
----- Message from Rachana Hardin sent at 8/5/2019 12:16 PM CDT -----  Contact: self  Type:  Patient Returning Call    Who Called:  Patient   Who Left Message for Patient: Jarrod  Does the patient know what this is regarding?:    Best Call Back Number:  [phone] (home)     Additional Information:  Returning call from 08/02

## 2019-08-06 ENCOUNTER — TELEPHONE (OUTPATIENT)
Dept: HEMATOLOGY/ONCOLOGY | Facility: CLINIC | Age: 54
End: 2019-08-06

## 2019-08-06 NOTE — TELEPHONE ENCOUNTER
----- Message from Vera Long sent at 8/6/2019  1:37 PM CDT -----  Type:  Patient Returning Call    Who Called:  Patient   Who Left Message for Patient:  Jarrod  Does the patient know what this is regarding?:  Scheduling the BMBX 8/19  Best Call Back Number: [phone] no need for return call 8/19 is fine   Additional Information:  He said 8/19 is fine

## 2019-08-06 NOTE — TELEPHONE ENCOUNTER
Attempted to contact Pt regarding his BMBX. Calling to confirm date with pt on 8/19/19. Left message with contact number.

## 2019-08-07 ENCOUNTER — TELEPHONE (OUTPATIENT)
Dept: FAMILY MEDICINE | Facility: CLINIC | Age: 54
End: 2019-08-07

## 2019-08-07 NOTE — TELEPHONE ENCOUNTER
----- Message from Cheikh Richter sent at 8/7/2019  9:05 AM CDT -----  Contact: Pt  Type:  Needs Medical Advice    Who Called: Pt   Symptoms (please be specific): n/a   How long has patient had these symptoms:  n/a  Pharmacy name and phone #:  n/a  Would the patient rather a call back or a response via [facility]? Call back   Best Call Back Number: [phone] (home)   Additional Information: The patient is calling in regards to confirming that his paperwork is completed and would like to come the paperwork up today if possible,please advise.

## 2019-08-12 ENCOUNTER — EXTERNAL HOME HEALTH (OUTPATIENT)
Dept: HOME HEALTH SERVICES | Facility: HOSPITAL | Age: 54
End: 2019-08-12
Payer: COMMERCIAL

## 2019-08-14 ENCOUNTER — TELEPHONE (OUTPATIENT)
Dept: FAMILY MEDICINE | Facility: CLINIC | Age: 54
End: 2019-08-14

## 2019-08-14 NOTE — TELEPHONE ENCOUNTER
I filled out paperwork except for the day that he is returning to work.  Please place that on the form and we the  and let them know.

## 2019-08-14 NOTE — TELEPHONE ENCOUNTER
----- Message from Gissel Nam sent at 8/14/2019  9:19 AM CDT -----  Type:  Needs Medical Advice    Who Called:  Pt  Sister  (Yohana)  Symptoms (please be specific):       How long has patient had these symptoms:     Pharmacy name and phone #:     Would the patient rather a call back or a response via [facility]?  Call back  Best Call Back Number:    [phone]  Additional Information:   States she is calling to check on the status of pt's FMLA paperwork//please call to discuss//fredrick/rudy

## 2019-08-14 NOTE — TELEPHONE ENCOUNTER
Informed pt's sister that paperwork is on Dr Stevenson's desk awaiting completion. States she needs the paperwork ASAP.

## 2019-08-19 RX ORDER — MIDODRINE HYDROCHLORIDE 10 MG/1
5 TABLET ORAL 2 TIMES DAILY
Qty: 60 TABLET | Refills: 0 | Status: SHIPPED | OUTPATIENT
Start: 2019-08-19

## 2019-08-20 ENCOUNTER — PATIENT OUTREACH (OUTPATIENT)
Dept: ADMINISTRATIVE | Facility: CLINIC | Age: 54
End: 2019-08-20

## 2019-08-20 NOTE — PROGRESS NOTES
Josefina Hooks RN attempted to contact patient. No answer. The following message was left for the patient to return the call:  Good afternoon I am a nurse calling on behalf of [facility] from the [facility].  This is a Transitional Care Call for Leodan Dan. When you have a moment please contact us at [phone] or [phone] Monday through Friday, between the hours of 8 am to 4 pm. We look forward to speaking with you. On behalf of [facility] have a nice day.    The patient has a scheduled HOSFU appointment with Abhijit Cox on 9/5/19 @ [id number]. Message sent to Physician staff.

## 2019-08-22 ENCOUNTER — PATIENT OUTREACH (OUTPATIENT)
Dept: ADMINISTRATIVE | Facility: HOSPITAL | Age: 54
End: 2019-08-22

## 2019-08-22 NOTE — PROGRESS NOTES
Health Maintenance reviewed. Colonoscopy dated 7/22/19 performed by Dr. Murdock at [facility] sent to MA for media upload. Lipid panel order pended.

## 2019-08-26 RX ORDER — CARVEDILOL 25 MG/1
25 TABLET ORAL 2 TIMES DAILY
Qty: 180 TABLET | Refills: 3 | Status: SHIPPED | OUTPATIENT
Start: 2019-08-26 | End: 2020-01-22 | Stop reason: SDUPTHER

## 2019-08-26 NOTE — TELEPHONE ENCOUNTER
----- Message from Kim Connelly sent at 8/26/2019  4:09 PM CDT -----  Contact: self  Type:  RX Refill Request    Who Called: pt  Refill or New Rx:refill  RX Name and Strength:blood pressure/mestion  How is the patient currently taking it? (ex. 1XDay):2xday/3xday  Is this a 30 day or 90 day RX:30/30  Preferred Pharmacy with phone number:  [id number] - [place], [place] - [address] [facility]  [address] [facility] [address]  Phone: [phone] Fax: [phone]  Local or Mail Order:local  Ordering Provider:bo  Would the patient rather a call back or a response via [facility]? Call back  Best Call Back Number:[phone]  Additional Information: pt is currently out of the blood pressure medication.    Thanks,  Kim Connelly

## 2019-08-28 ENCOUNTER — PATIENT OUTREACH (OUTPATIENT)
Dept: ADMINISTRATIVE | Facility: HOSPITAL | Age: 54
End: 2019-08-28

## 2019-09-04 ENCOUNTER — OFFICE VISIT (OUTPATIENT)
Dept: HEMATOLOGY/ONCOLOGY | Facility: CLINIC | Age: 54
End: 2019-09-04
Payer: COMMERCIAL

## 2019-09-04 VITALS
WEIGHT: 315 LBS | HEART RATE: 84 BPM | OXYGEN SATURATION: 95 % | BODY MASS INDEX: 38.36 KG/M2 | DIASTOLIC BLOOD PRESSURE: 74 MMHG | RESPIRATION RATE: 20 BRPM | SYSTOLIC BLOOD PRESSURE: 163 MMHG | HEIGHT: 76 IN | TEMPERATURE: 99 F

## 2019-09-04 DIAGNOSIS — Z99.2 ESRD (END STAGE RENAL DISEASE) ON DIALYSIS: ICD-10-CM

## 2019-09-04 DIAGNOSIS — N18.6 ESRD (END STAGE RENAL DISEASE) ON DIALYSIS: ICD-10-CM

## 2019-09-04 DIAGNOSIS — T81.89XS NONHEALING SURGICAL WOUND, SEQUELA: ICD-10-CM

## 2019-09-04 DIAGNOSIS — E66.01 MORBID OBESITY: ICD-10-CM

## 2019-09-04 DIAGNOSIS — C90.01 MULTIPLE MYELOMA IN REMISSION: Primary | ICD-10-CM

## 2019-09-04 PROCEDURE — 99999 PR PBB SHADOW E&M-EST. PATIENT-LVL V: CPT | Mod: PBBFAC,,, | Performed by: INTERNAL MEDICINE

## 2019-09-04 PROCEDURE — 99999 PR PBB SHADOW E&M-EST. PATIENT-LVL V: ICD-10-PCS | Mod: PBBFAC,,, | Performed by: INTERNAL MEDICINE

## 2019-09-04 PROCEDURE — 99214 OFFICE O/P EST MOD 30 MIN: CPT | Mod: S$GLB,,, | Performed by: INTERNAL MEDICINE

## 2019-09-04 PROCEDURE — 99214 PR OFFICE/OUTPT VISIT, EST, LEVL IV, 30-39 MIN: ICD-10-PCS | Mod: S$GLB,,, | Performed by: INTERNAL MEDICINE

## 2019-09-04 PROCEDURE — 3008F BODY MASS INDEX DOCD: CPT | Mod: CPTII,S$GLB,, | Performed by: INTERNAL MEDICINE

## 2019-09-04 PROCEDURE — 3008F PR BODY MASS INDEX (BMI) DOCUMENTED: ICD-10-PCS | Mod: CPTII,S$GLB,, | Performed by: INTERNAL MEDICINE

## 2019-09-04 RX ORDER — ONDANSETRON HYDROCHLORIDE 8 MG/1
8 TABLET, FILM COATED ORAL EVERY 8 HOURS PRN
Refills: 0 | COMMUNITY
Start: 2019-08-15

## 2019-09-04 RX ORDER — HYDRALAZINE HYDROCHLORIDE 50 MG/1
50 TABLET, FILM COATED ORAL 3 TIMES DAILY
Refills: 4 | COMMUNITY
Start: 2019-08-13

## 2019-09-04 RX ORDER — PYRIDOSTIGMINE BROMIDE 60 MG/1
120 TABLET ORAL 3 TIMES DAILY
Qty: 180 TABLET | Refills: 2 | Status: SHIPPED | OUTPATIENT
Start: 2019-09-04 | End: 2020-05-04 | Stop reason: SDUPTHER

## 2019-09-04 NOTE — PROGRESS NOTES
HISTORY OF PRESENT ILLNESS:  A 52-year-old white gentleman well known to me for IgG lambda  multiple myeloma, which went into remission following induction therapy with ZRD   and a protracted course of maintenance Revlimid as the patient's paraprotein   levels were undetectable for long period of time.  He was taken off all therapy   and observed.  He has remained without evidence of relapse.  His course has been   complicated by nonhealing lower extremity wounds and underlying CKD.  Since last office evaluation, the patient developed severe volume overload and the development of end-stage renal disease which required institution of emergent hemodialysis.  He has been receiving this for approximately 1 month and has tolerated it poorly.  In light of the patient's new onset of end-stage renal disease, we opted to pursue bone marrow aspiration and biopsy to reassess the status of his myeloma.  He returns to clinic to review results.  His tolerance to hemodialysis appears to be improving.  Overall, his stamina has increased, and he is having less difficulty with syncope.  No other new complaints pertinent findings on a 14 point review of systems.    PHYSICAL EXAMINATION:  GENERAL:  Well-developed, morbidly obese, black gentleman in no acute distress, who is ambulating with the assistance of a cane.  VITAL SIGNS:  Documented in EMR and reviewed.  HEENT:  Normocephalic, atraumatic.  Oral mucosa pink and moist.  Lips without   lesions.  Tongue midline.  Oropharynx clear.  Nonicteric sclerae.   NECK:  Supple.  No adenopathy.  HEART:  Regular rate and rhythm without murmur, gallop or rub.               LUNGS:  Clear to auscultation bilaterally.                                  ABDOMEN:  Soft, nontender and nondistended with positive normoactive bowel   sounds.  No hepatosplenomegaly.  EXTREMITIES:  Bilateral lower extremity edema.  Wounds dressed/dry/intact.    Bone marrow aspiration and biopsy:  No evidence of residual  myeloma.    IMPRESSION:  1.  IgG lambda Multiple myeloma -remains in remission.  2.  Morbid obesity.  3.  Chronic nonhealing lower extremity wounds.  4.  End-stage renal disease requiring hemodialysis.    PLAN:  1.  Continued observation with the patient to return to clinic every 4 months to review labs including CBC, CMP, LDH, serum protein electrophoresis with immunofixation, beta 2 microglobulin, and free light chain analysis.    This note was created using voice recognition software and may contain grammatical errors.

## 2019-09-22 PROCEDURE — G0179 MD RECERTIFICATION HHA PT: HCPCS | Mod: ,,, | Performed by: FAMILY MEDICINE

## 2019-09-22 PROCEDURE — G0179 PR HOME HEALTH MD RECERTIFICATION: ICD-10-PCS | Mod: ,,, | Performed by: FAMILY MEDICINE

## 2019-09-24 ENCOUNTER — TELEPHONE (OUTPATIENT)
Dept: HOME HEALTH SERVICES | Facility: HOSPITAL | Age: 54
End: 2019-09-24

## 2019-09-30 ENCOUNTER — EXTERNAL HOME HEALTH (OUTPATIENT)
Dept: HOME HEALTH SERVICES | Facility: HOSPITAL | Age: 54
End: 2019-09-30
Payer: COMMERCIAL

## 2019-10-11 ENCOUNTER — OFFICE VISIT (OUTPATIENT)
Dept: FAMILY MEDICINE | Facility: CLINIC | Age: 54
End: 2019-10-11
Payer: COMMERCIAL

## 2019-10-11 ENCOUNTER — TELEPHONE (OUTPATIENT)
Dept: FAMILY MEDICINE | Facility: CLINIC | Age: 54
End: 2019-10-11

## 2019-10-11 VITALS
HEIGHT: 76 IN | TEMPERATURE: 98 F | DIASTOLIC BLOOD PRESSURE: 68 MMHG | HEART RATE: 82 BPM | SYSTOLIC BLOOD PRESSURE: 101 MMHG | BODY MASS INDEX: 38.36 KG/M2 | WEIGHT: 315 LBS

## 2019-10-11 DIAGNOSIS — C90.01 MULTIPLE MYELOMA IN REMISSION: ICD-10-CM

## 2019-10-11 DIAGNOSIS — M54.50 ACUTE RIGHT-SIDED LOW BACK PAIN WITHOUT SCIATICA: Primary | ICD-10-CM

## 2019-10-11 DIAGNOSIS — Z99.2 ESRD (END STAGE RENAL DISEASE) ON DIALYSIS: ICD-10-CM

## 2019-10-11 DIAGNOSIS — N39.0 URINARY TRACT INFECTION WITH HEMATURIA, SITE UNSPECIFIED: Primary | ICD-10-CM

## 2019-10-11 DIAGNOSIS — R31.9 URINARY TRACT INFECTION WITH HEMATURIA, SITE UNSPECIFIED: Primary | ICD-10-CM

## 2019-10-11 DIAGNOSIS — N18.6 ESRD (END STAGE RENAL DISEASE) ON DIALYSIS: ICD-10-CM

## 2019-10-11 LAB
BACTERIA #/AREA URNS HPF: ABNORMAL /HPF
BILIRUB UR QL STRIP: NEGATIVE
CLARITY UR: CLEAR
COLOR UR: YELLOW
GLUCOSE UR QL STRIP: NEGATIVE
HGB UR QL STRIP: ABNORMAL
HYALINE CASTS #/AREA URNS LPF: 2 /LPF
KETONES UR QL STRIP: NEGATIVE
LEUKOCYTE ESTERASE UR QL STRIP: ABNORMAL
MICROSCOPIC COMMENT: ABNORMAL
NITRITE UR QL STRIP: NEGATIVE
PH UR STRIP: 6 [PH] (ref 5–8)
PROT UR QL STRIP: ABNORMAL
RBC #/AREA URNS HPF: 3 /HPF (ref 0–4)
SP GR UR STRIP: 1.03 (ref 1–1.03)
SQUAMOUS #/AREA URNS HPF: 8 /HPF
URN SPEC COLLECT METH UR: ABNORMAL
WBC #/AREA URNS HPF: 25 /HPF (ref 0–5)

## 2019-10-11 PROCEDURE — 99999 PR PBB SHADOW E&M-EST. PATIENT-LVL IV: ICD-10-PCS | Mod: PBBFAC,,, | Performed by: FAMILY MEDICINE

## 2019-10-11 PROCEDURE — 99999 PR PBB SHADOW E&M-EST. PATIENT-LVL IV: CPT | Mod: PBBFAC,,, | Performed by: FAMILY MEDICINE

## 2019-10-11 PROCEDURE — 81000 URINALYSIS NONAUTO W/SCOPE: CPT | Mod: PO

## 2019-10-11 PROCEDURE — 3008F BODY MASS INDEX DOCD: CPT | Mod: CPTII,S$GLB,, | Performed by: FAMILY MEDICINE

## 2019-10-11 PROCEDURE — 3008F PR BODY MASS INDEX (BMI) DOCUMENTED: ICD-10-PCS | Mod: CPTII,S$GLB,, | Performed by: FAMILY MEDICINE

## 2019-10-11 PROCEDURE — 99213 OFFICE O/P EST LOW 20 MIN: CPT | Mod: S$GLB,,, | Performed by: FAMILY MEDICINE

## 2019-10-11 PROCEDURE — 99213 PR OFFICE/OUTPT VISIT, EST, LEVL III, 20-29 MIN: ICD-10-PCS | Mod: S$GLB,,, | Performed by: FAMILY MEDICINE

## 2019-10-11 RX ORDER — AMOXICILLIN AND CLAVULANATE POTASSIUM 500; 125 MG/1; MG/1
1 TABLET, FILM COATED ORAL DAILY
Qty: 7 TABLET | Refills: 0 | Status: SHIPPED | OUTPATIENT
Start: 2019-10-11 | End: 2019-10-18

## 2019-10-11 RX ORDER — CIPROFLOXACIN 250 MG/1
250 TABLET, FILM COATED ORAL DAILY
Qty: 7 TABLET | Refills: 0 | Status: CANCELLED | OUTPATIENT
Start: 2019-10-11 | End: 2019-10-18

## 2019-10-11 RX ORDER — HYDROCODONE BITARTRATE AND ACETAMINOPHEN 7.5; 325 MG/1; MG/1
1 TABLET ORAL EVERY 8 HOURS PRN
Qty: 21 TABLET | Refills: 0 | Status: SHIPPED | OUTPATIENT
Start: 2019-10-11 | End: 2019-10-18

## 2019-10-11 NOTE — TELEPHONE ENCOUNTER
I had to change the prescription to Augmentin as there is a contraindication due to his myasthenia gravis with the Cipro.  Prescription sent for Augmentin 500 mg once a day for a week.

## 2019-10-11 NOTE — TELEPHONE ENCOUNTER
Urinalysis with possible UTI versus contaminant.  Please get urine culture.  In the meantime please send prescription Cipro 250 mg once daily x7 days.  If the culture comes back negative then we could stop it. My nurse will contact you to arrange.   Thanks,   Dr. Crawford

## 2019-10-11 NOTE — TELEPHONE ENCOUNTER
Patient informed, can't come back today, advised first thing tomorrow, if possible, but he did not sound like he would.

## 2019-10-11 NOTE — TELEPHONE ENCOUNTER
See if we can get more urine collected prior to him starting the antibiotic today as I would really like to get a culture on this.

## 2019-10-11 NOTE — PROGRESS NOTES
Patient complains of lower back pain. Located primarily at the right lower back though some mild symptoms at the left lower back.. Symptoms started _ 2 nights ago . No injury. no bowel or bladder complaints.  He is on dialysis with end-stage renal disease and myeloma in remission.  He does state previous kidney stones.  Symptoms do not _ radiate.  Current treatment Tylenol with partial improvement.  Symptoms increased with bending and twisting motions.      Past Medical History:  Past Medical History:   Diagnosis Date    Acquired diverticulosis of colon     Allergy     Anemia     iron deficient,chronic, with paraprotenemia    Anticoagulant long-term use     Dyspnea on exertion     ED (erectile dysfunction) 05/15/12    Elevated liver enzymes     GERD (gastroesophageal reflux disease)     Hepatitis B core antibody positive 2012    Hyperglycemia     Hypertension     Hypogonadism male     Iron deficiency     Kidney stone     Left ventricular diastolic dysfunction with preserved systolic function     Multiple myeloma, stage 1 2012    Myasthenia gravis associated with thymoma     last exacerbation around early 2011, experienced weakness    Obesity     Prostatism 05/15/12    Renal insufficiency     sees Dr. Fernandez for this.     Sciatic nerve injury     Sleep apnea     He uses a CPAP for this. Room air    Spermatocele of epididymis, single 05/15/12    left sided cyst, had surgery    Ulcer 2011    peptic ulcer    Varicose veins     sometimes wears compression stockings     Past Surgical History:   Procedure Laterality Date    BONE MARROW BIOPSY N/A 8/19/2019    Procedure: BIOPSY, BONE MARROW;  Surgeon: Arias Iqbal MD;  Location: [facility];  Service: Oncology;  Laterality: N/A;    COLONOSCOPY  9/17/2012    was normal except for diverticulosis by Dr. Murdock     CYSTOSCOPY W/ DECANNULATION      DIALYSIS FISTULA CREATION      ESOPHAGOGASTRODUODENOSCOPY  2011    He had ulcers.    JOINT  REPLACEMENT      knee    KNEE SURGERY      left    SPERMATOCELECTOMY  05/15/12    Left side    THYMECTOMY  2003    for myasthenia gravis     Review of patient's allergies indicates:   Allergen Reactions    Hydrocodone-acetaminophen Other (See Comments)     Weak  & dizziness  Weak  & dizziness  Nausea, dizzy  Takes apap      Ibuprofen      Motrin problems    Lisinopril      Other reaction(s): chronic cough  cough    Nebivolol Other (See Comments)     Severe hypotension  Heart rate dropped to 39       Current Outpatient Medications on File Prior to Visit   Medication Sig Dispense Refill    ascorbic acid (VITAMIN C) 1000 MG tablet Take 1,000 mg by mouth once daily.      aspirin (ECOTRIN) 81 MG EC tablet Take 81 mg by mouth once daily.      carvedilol (COREG) 25 MG tablet Take 1 tablet (25 mg total) by mouth 2 (two) times daily. 180 tablet 3    cinacalcet (SENSIPAR) 30 MG Tab TAKE 1 TABLET BY MOUTH THREE TIMES A WEEK  0    ferrous sulfate 134 mg (27 mg iron) Tab Take 65 mg by mouth once daily.       hydrALAZINE (APRESOLINE) 50 MG tablet Take 50 mg by mouth 3 (three) times daily.  4    MEN'S MULTI-VITAMIN ORAL Take by mouth.      midodrine (PROAMATINE) 10 MG tablet Take 0.5 tablets (5 mg total) by mouth 2 (two) times daily. 60 tablet 0    ondansetron (ZOFRAN) 8 MG tablet Take 8 mg by mouth every 8 (eight) hours as needed.  0    pantoprazole (PROTONIX) 40 MG tablet Take 40 mg by mouth once daily.  1    pyridostigmine (MESTINON) 60 mg Tab Take 2 tablets (120 mg total) by mouth 3 (three) times daily. 180 tablet 2    sertraline (ZOLOFT) 50 MG tablet Take 50 mg by mouth once daily.  0    sevelamer carbonate (RENVELA) 800 mg Tab Take 2,400 mg by mouth 3 (three) times daily with meals.      sodium bicarbonate 650 MG tablet Take 1,300 mg by mouth 2 (two) times daily.  0    sucralfate (CARAFATE) 100 mg/mL suspension Take 1 g by mouth every 6 (six) hours.      telmisartan (MICARDIS) 80 MG Tab Take 80 mg by  mouth once daily.      vitamin D (VITAMIN D3) 1000 units Tab Take 1,000 Units by mouth once daily.       No current facility-administered medications on file prior to visit.      Social History     Socioeconomic History    Marital status:      Spouse name: Not on file    Number of children: Not on file    Years of education: Not on file    Highest education level: Not on file   Occupational History    Not on file   Social Needs    Financial resource strain: Not on file    Food insecurity:     Worry: Not on file     Inability: Not on file    Transportation needs:     Medical: Not on file     Non-medical: Not on file   Tobacco Use    Smoking status: Never Smoker    Smokeless tobacco: Never Used   Substance and Sexual Activity    Alcohol use: No    Drug use: No    Sexual activity: Not on file   Lifestyle    Physical activity:     Days per week: Not on file     Minutes per session: Not on file    Stress: Not on file   Relationships    Social connections:     Talks on phone: Not on file     Gets together: Not on file     Attends Latter-day service: Not on file     Active member of club or organization: Not on file     Attends meetings of clubs or organizations: Not on file     Relationship status: Not on file   Other Topics Concern    Not on file   Social History Narrative    Not on file     Family History   Problem Relation Age of Onset    Diabetes Mother     Hypertension Mother     Heart failure Mother     Diabetes Mellitus Mother     Prostate cancer Father     Hypertension Father     Lung cancer Father     Diabetes Sister     Hypertension Sister     Diabetes Mellitus Sister     Prostate cancer Paternal Uncle     Hypertension Brother     Stroke Neg Hx     Heart attack Neg Hx        Vitals:    10/11/19 1129   BP: 101/68   BP Location: Left arm   Patient Position: Sitting   BP Method: Large (Automatic)   Pulse: 82   Temp: 98.2 °F (36.8 °C)   Weight: (!) 185.1 kg (408 lb)   Height:  "6' 4" (1.93 m)           Physical Exam: See vital signs note   general: No acute distress   Chest clear to auscultation. Normal respiratory effort   Heart: Regular rate and rhythm .   Abdomen: Positive bowel sounds soft nontender no hepato- splenomegaly.   Back: Decreased range of motion. Tender to palpation over the _ right lower paraspinous muscles. No spinous tenderness. Negative straight leg raise. Deep tendon reflexes intact. Using cane.  Motor strength intact.    Leodan was seen today for flank pain.    Diagnoses and all orders for this visit:    Acute right-sided low back pain without sciatica  -     Urinalysis    Multiple myeloma in remission  -     Urinalysis    ESRD (end stage renal disease) on dialysis  -     Urinalysis    Other orders  -     HYDROcodone-acetaminophen (NORCO) 7.5-325 mg per tablet; Take 1 tablet by mouth every 8 (eight) hours as needed for Pain.     not candidate for NSAIDs.  We gave him a few hydrocodone.  He took this a few weeks ago after a procedure on his arm and tolerated well without allergy reaction.    Follow-up if symptoms worsen or not improving with above.   "

## 2019-10-11 NOTE — TELEPHONE ENCOUNTER
Have him go ahead and start the antibiotics today whether he is able to get the urine for the culture or not.

## 2019-10-25 DIAGNOSIS — M25.562 LEFT KNEE PAIN, UNSPECIFIED CHRONICITY: Primary | ICD-10-CM

## 2019-10-25 DIAGNOSIS — Z96.651 STATUS POST TOTAL RIGHT KNEE REPLACEMENT: ICD-10-CM

## 2019-10-29 ENCOUNTER — OFFICE VISIT (OUTPATIENT)
Dept: ORTHOPEDICS | Facility: CLINIC | Age: 54
End: 2019-10-29
Payer: COMMERCIAL

## 2019-10-29 ENCOUNTER — HOSPITAL ENCOUNTER (OUTPATIENT)
Dept: RADIOLOGY | Facility: HOSPITAL | Age: 54
Discharge: HOME | End: 2019-10-29
Attending: ORTHOPAEDIC SURGERY
Payer: COMMERCIAL

## 2019-10-29 VITALS
SYSTOLIC BLOOD PRESSURE: 123 MMHG | HEIGHT: 76 IN | HEART RATE: 83 BPM | DIASTOLIC BLOOD PRESSURE: 55 MMHG | BODY MASS INDEX: 38.36 KG/M2 | WEIGHT: 315 LBS

## 2019-10-29 DIAGNOSIS — M17.11 PRIMARY OSTEOARTHRITIS OF RIGHT KNEE: Primary | ICD-10-CM

## 2019-10-29 DIAGNOSIS — M25.562 LEFT KNEE PAIN, UNSPECIFIED CHRONICITY: ICD-10-CM

## 2019-10-29 DIAGNOSIS — Z96.651 STATUS POST TOTAL RIGHT KNEE REPLACEMENT: ICD-10-CM

## 2019-10-29 PROCEDURE — 3008F PR BODY MASS INDEX (BMI) DOCUMENTED: ICD-10-PCS | Mod: CPTII,S$GLB,, | Performed by: ORTHOPAEDIC SURGERY

## 2019-10-29 PROCEDURE — 3008F BODY MASS INDEX DOCD: CPT | Mod: CPTII,S$GLB,, | Performed by: ORTHOPAEDIC SURGERY

## 2019-10-29 PROCEDURE — 20610 DRAIN/INJ JOINT/BURSA W/O US: CPT | Mod: RT,S$GLB,, | Performed by: ORTHOPAEDIC SURGERY

## 2019-10-29 PROCEDURE — 73562 X-RAY EXAM OF KNEE 3: CPT | Mod: TC,50,PO

## 2019-10-29 PROCEDURE — 99999 PR PBB SHADOW E&M-EST. PATIENT-LVL III: ICD-10-PCS | Mod: PBBFAC,,, | Performed by: ORTHOPAEDIC SURGERY

## 2019-10-29 PROCEDURE — 20610 LARGE JOINT ASPIRATION/INJECTION: R KNEE: ICD-10-PCS | Mod: RT,S$GLB,, | Performed by: ORTHOPAEDIC SURGERY

## 2019-10-29 PROCEDURE — 99999 PR PBB SHADOW E&M-EST. PATIENT-LVL III: CPT | Mod: PBBFAC,,, | Performed by: ORTHOPAEDIC SURGERY

## 2019-10-29 PROCEDURE — 99214 OFFICE O/P EST MOD 30 MIN: CPT | Mod: 25,S$GLB,, | Performed by: ORTHOPAEDIC SURGERY

## 2019-10-29 PROCEDURE — 73562 X-RAY EXAM OF KNEE 3: CPT | Mod: 26,,, | Performed by: RADIOLOGY

## 2019-10-29 PROCEDURE — 73562 XR KNEE ORTHO BILAT: ICD-10-PCS | Mod: 26,,, | Performed by: RADIOLOGY

## 2019-10-29 PROCEDURE — 99214 PR OFFICE/OUTPT VISIT, EST, LEVL IV, 30-39 MIN: ICD-10-PCS | Mod: 25,S$GLB,, | Performed by: ORTHOPAEDIC SURGERY

## 2019-10-29 RX ORDER — NIFEDIPINE 90 MG/1
90 TABLET, EXTENDED RELEASE ORAL DAILY
Refills: 3 | COMMUNITY
Start: 2019-10-03

## 2019-10-29 RX ORDER — LOSARTAN POTASSIUM 50 MG/1
50 TABLET ORAL DAILY
Refills: 5 | COMMUNITY
Start: 2019-10-06 | End: 2020-01-22 | Stop reason: SDUPTHER

## 2019-10-29 RX ORDER — DICLOFENAC SODIUM 10 MG/G
2 GEL TOPICAL 3 TIMES DAILY
Qty: 100 G | Refills: 0 | Status: SHIPPED | OUTPATIENT
Start: 2019-10-29 | End: 2020-01-30 | Stop reason: SDUPTHER

## 2019-10-29 RX ORDER — AMLODIPINE AND BENAZEPRIL HYDROCHLORIDE 5; 20 MG/1; MG/1
1 CAPSULE ORAL DAILY
Refills: 1 | COMMUNITY
Start: 2019-09-24 | End: 2020-05-13 | Stop reason: SINTOL

## 2019-10-29 RX ADMIN — TRIAMCINOLONE ACETONIDE 40 MG: 40 INJECTION, SUSPENSION INTRA-ARTICULAR; INTRAMUSCULAR at 08:10

## 2019-10-29 NOTE — PROCEDURES
Large Joint Aspiration/Injection: R knee  Date/Time: 10/29/2019 8:30 AM  Performed by: North Mccollum MD  Authorized by: North Mccollum MD     Consent Done?:  Yes (Verbal)  Indications:  Pain  Procedure site marked: Yes    Timeout: Prior to procedure the correct patient, procedure, and site was verified      Location:  Knee  Site:  R knee  Prep: Patient was prepped and draped in usual sterile fashion    Needle size:  21 G  Approach:  Anterolateral  Medications:  40 mg triamcinolone acetonide 40 mg/mL; 40 mg triamcinolone acetonide 40 mg/mL  Patient tolerance:  Patient tolerated the procedure well with no immediate complications

## 2019-10-29 NOTE — PROGRESS NOTES
Chief Complaint   Patient presents with    Right Knee - Pain         HPI:   This is a 53 y.o. who presents to clinic today complaining of right knee pain for years after no known trauma. Pain is progressively worsening. No numbness or tingling. No associated signs or symptoms.    Past Medical History:   Diagnosis Date    Acquired diverticulosis of colon     Allergy     Anemia     iron deficient,chronic, with paraprotenemia    Anticoagulant long-term use     Dyspnea on exertion     ED (erectile dysfunction) 05/15/12    Elevated liver enzymes     GERD (gastroesophageal reflux disease)     Hepatitis B core antibody positive 2012    Hyperglycemia     Hypertension     Hypogonadism male     Iron deficiency     Kidney stone     Left ventricular diastolic dysfunction with preserved systolic function     Multiple myeloma, stage 1 2012    Myasthenia gravis associated with thymoma     last exacerbation around early 2011, experienced weakness    Obesity     Prostatism 05/15/12    Renal insufficiency     sees Dr. Fernandez for this.     Sciatic nerve injury     Sleep apnea     He uses a CPAP for this. Room air    Spermatocele of epididymis, single 05/15/12    left sided cyst, had surgery    Ulcer 2011    peptic ulcer    Varicose veins     sometimes wears compression stockings     Past Surgical History:   Procedure Laterality Date    BONE MARROW BIOPSY N/A 8/19/2019    Procedure: BIOPSY, BONE MARROW;  Surgeon: Arias Iqbal MD;  Location: [facility];  Service: Oncology;  Laterality: N/A;    COLONOSCOPY  9/17/2012    was normal except for diverticulosis by Dr. Murdock     CYSTOSCOPY W/ DECANNULATION      DIALYSIS FISTULA CREATION      ESOPHAGOGASTRODUODENOSCOPY  2011    He had ulcers.    JOINT REPLACEMENT      knee    KNEE SURGERY      left    SPERMATOCELECTOMY  05/15/12    Left side    THYMECTOMY  2003    for myasthenia gravis     Current Outpatient Medications on File Prior to Visit   Medication  Sig Dispense Refill    amlodipine-benazepril 5-20 mg (LOTREL) 5-20 mg per capsule Take 1 capsule by mouth once daily.  1    ascorbic acid (VITAMIN C) 1000 MG tablet Take 1,000 mg by mouth once daily.      carvedilol (COREG) 25 MG tablet Take 1 tablet (25 mg total) by mouth 2 (two) times daily. 180 tablet 3    cinacalcet (SENSIPAR) 30 MG Tab TAKE 1 TABLET BY MOUTH THREE TIMES A WEEK  0    ferrous sulfate 134 mg (27 mg iron) Tab Take 65 mg by mouth once daily.       hydrALAZINE (APRESOLINE) 50 MG tablet Take 50 mg by mouth 3 (three) times daily.  4    losartan (COZAAR) 50 MG tablet Take 50 mg by mouth once daily.  5    midodrine (PROAMATINE) 10 MG tablet Take 0.5 tablets (5 mg total) by mouth 2 (two) times daily. (Patient taking differently: Take 5 mg by mouth 2 (two) times daily as needed. ) 60 tablet 0    NIFEdipine (PROCARDIA-XL) 90 MG (OSM) 24 hr tablet Take 90 mg by mouth once daily.  3    ondansetron (ZOFRAN) 8 MG tablet Take 8 mg by mouth every 8 (eight) hours as needed.  0    pantoprazole (PROTONIX) 40 MG tablet Take 40 mg by mouth once daily.  1    pyridostigmine (MESTINON) 60 mg Tab Take 2 tablets (120 mg total) by mouth 3 (three) times daily. 180 tablet 2    sertraline (ZOLOFT) 50 MG tablet Take 50 mg by mouth once daily.  0    sevelamer carbonate (RENVELA) 800 mg Tab Take 2,400 mg by mouth 3 (three) times daily with meals.      sodium bicarbonate 650 MG tablet Take 1,300 mg by mouth 2 (two) times daily.  0    sucralfate (CARAFATE) 100 mg/mL suspension Take 1 g by mouth every 6 (six) hours.      telmisartan (MICARDIS) 80 MG Tab Take 80 mg by mouth once daily.      vitamin D (VITAMIN D3) 1000 units Tab Take 1,000 Units by mouth once daily.      aspirin (ECOTRIN) 81 MG EC tablet Take 81 mg by mouth once daily.      MEN'S MULTI-VITAMIN ORAL Take by mouth.       No current facility-administered medications on file prior to visit.      Review of patient's allergies indicates:   Allergen  Reactions    Hydrocodone-acetaminophen Other (See Comments)     Weak  & dizziness  Weak  & dizziness  Nausea, dizzy  Takes apap      Ibuprofen      Motrin problems    Lisinopril      Other reaction(s): chronic cough  cough    Nebivolol Other (See Comments)     Severe hypotension  Heart rate dropped to 39       Family History   Problem Relation Age of Onset    Diabetes Mother     Hypertension Mother     Heart failure Mother     Diabetes Mellitus Mother     Prostate cancer Father     Hypertension Father     Lung cancer Father     Diabetes Sister     Hypertension Sister     Diabetes Mellitus Sister     Prostate cancer Paternal Uncle     Hypertension Brother     Stroke Neg Hx     Heart attack Neg Hx      Social History     Socioeconomic History    Marital status:      Spouse name: Not on file    Number of children: Not on file    Years of education: Not on file    Highest education level: Not on file   Occupational History    Not on file   Social Needs    Financial resource strain: Not on file    Food insecurity:     Worry: Not on file     Inability: Not on file    Transportation needs:     Medical: Not on file     Non-medical: Not on file   Tobacco Use    Smoking status: Never Smoker    Smokeless tobacco: Never Used   Substance and Sexual Activity    Alcohol use: No    Drug use: No    Sexual activity: Not on file   Lifestyle    Physical activity:     Days per week: Not on file     Minutes per session: Not on file    Stress: Not on file   Relationships    Social connections:     Talks on phone: Not on file     Gets together: Not on file     Attends Taoism service: Not on file     Active member of club or organization: Not on file     Attends meetings of clubs or organizations: Not on file     Relationship status: Not on file   Other Topics Concern    Not on file   Social History Narrative    Not on file       Review of Systems:  Constitutional:  Denies fever or chills    Eyes:  Denies change in visual acuity   HENT:  Denies nasal congestion or sore throat   Respiratory:  Denies cough or shortness of breath   Cardiovascular:  Denies chest pain or edema   GI:  Denies abdominal pain, nausea, vomiting, bloody stools or diarrhea   :  Denies dysuria   Integument:  Denies rash   Neurologic:  Denies headache, focal weakness or sensory changes   Endocrine:  Denies polyuria or polydipsia   Lymphatic:  Denies swollen glands   Psychiatric:  Denies depression or anxiety     Physical Exam:   Constitutional:  Well developed, well nourished, no acute distress, non-toxic appearance   Integument:  Well hydrated, no rash   Lymphatic:  No lymphadenopathy noted   Neurologic:  Alert & oriented x 3  Psychiatric:  Speech and behavior appropriate   Eyes: EOMI  Gi: abdomen soft    Bilateral Knee Exam    right Knee Exam     Tenderness   The patient is experiencing tenderness in the medial joint line.    Range of Motion   Extension: abnormal   Flexion: abnormal     Muscle Strength     The patient has normal knee strength.    Tests   Enma:  Medial - positive   Lachman:  Anterior - negative      Varus: negative  Valgus: negative  Patellar Apprehension: negative    Other   Erythema: absent  Sensation: normal  Pulse: present  Swelling: mild        left knee: ROM 0-120. Overall normal alignment. Incision healed. No erythema or fluctuance. Stable to stress. Skin intact. Compartments soft. NVI distally.         X-rays were performed, personally reviewed by me and findings discussed with the patient.  4 views of the bilateral knee show tricompartmental degenerative change most pronounced in the medial compartment with implants intact in good position    Primary osteoarthritis of right knee  -     Large Joint Aspiration/Injection: R knee    Other orders  -     diclofenac sodium (VOLTAREN) 1 % Gel; Apply 2 g topically 3 (three) times daily.  Dispense: 100 g; Refill: 0          Treatment options discussed,  including surgery. Discussed weight reduction prior to surgery.  He is pending consultation for gastric sleeve.  Using an aseptic technique, I injected 5 cc of lidocaine 1% without and 1 cc of kenalog 40mg into the right knee. The patient tolerated this well. I will have them return to clinic in 3 months.

## 2019-11-04 RX ORDER — TRIAMCINOLONE ACETONIDE 40 MG/ML
40 INJECTION, SUSPENSION INTRA-ARTICULAR; INTRAMUSCULAR
Status: DISCONTINUED | OUTPATIENT
Start: 2019-10-29 | End: 2019-11-04 | Stop reason: HOSPADM

## 2019-11-21 PROCEDURE — G0179 PR HOME HEALTH MD RECERTIFICATION: ICD-10-PCS | Mod: ,,, | Performed by: FAMILY MEDICINE

## 2019-11-21 PROCEDURE — G0179 MD RECERTIFICATION HHA PT: HCPCS | Mod: ,,, | Performed by: FAMILY MEDICINE

## 2019-11-22 ENCOUNTER — EXTERNAL HOME HEALTH (OUTPATIENT)
Dept: HOME HEALTH SERVICES | Facility: HOSPITAL | Age: 54
End: 2019-11-22
Payer: COMMERCIAL

## 2020-01-06 ENCOUNTER — LAB VISIT (OUTPATIENT)
Dept: LAB | Facility: HOSPITAL | Age: 55
End: 2020-01-06
Attending: INTERNAL MEDICINE
Payer: COMMERCIAL

## 2020-01-06 DIAGNOSIS — T81.89XS NONHEALING SURGICAL WOUND, SEQUELA: ICD-10-CM

## 2020-01-06 DIAGNOSIS — C90.01 MULTIPLE MYELOMA IN REMISSION: ICD-10-CM

## 2020-01-06 DIAGNOSIS — N18.6 ESRD (END STAGE RENAL DISEASE) ON DIALYSIS: ICD-10-CM

## 2020-01-06 DIAGNOSIS — Z99.2 ESRD (END STAGE RENAL DISEASE) ON DIALYSIS: ICD-10-CM

## 2020-01-06 DIAGNOSIS — E66.01 MORBID OBESITY: ICD-10-CM

## 2020-01-06 LAB
BASOPHILS # BLD AUTO: 0.06 K/UL (ref 0–0.2)
BASOPHILS NFR BLD: 1.2 % (ref 0–1.9)
DIFFERENTIAL METHOD: ABNORMAL
EOSINOPHIL # BLD AUTO: 0.2 K/UL (ref 0–0.5)
EOSINOPHIL NFR BLD: 3.3 % (ref 0–8)
ERYTHROCYTE [DISTWIDTH] IN BLOOD BY AUTOMATED COUNT: 16.4 % (ref 11.5–14.5)
HCT VFR BLD AUTO: 34.5 % (ref 40–54)
HGB BLD-MCNC: 10.7 G/DL (ref 14–18)
LYMPHOCYTES # BLD AUTO: 1.6 K/UL (ref 1–4.8)
LYMPHOCYTES NFR BLD: 32.3 % (ref 18–48)
MCH RBC QN AUTO: 29.2 PG (ref 27–31)
MCHC RBC AUTO-ENTMCNC: 31 G/DL (ref 32–36)
MCV RBC AUTO: 94 FL (ref 82–98)
MONOCYTES # BLD AUTO: 0.5 K/UL (ref 0.3–1)
MONOCYTES NFR BLD: 9.2 % (ref 4–15)
NEUTROPHILS # BLD AUTO: 2.6 K/UL (ref 1.8–7.7)
NEUTROPHILS NFR BLD: 54 % (ref 38–73)
PLATELET # BLD AUTO: 252 K/UL (ref 150–350)
PMV BLD AUTO: 9.7 FL (ref 9.2–12.9)
RBC # BLD AUTO: 3.67 M/UL (ref 4.6–6.2)
WBC # BLD AUTO: 4.89 K/UL (ref 3.9–12.7)

## 2020-01-06 PROCEDURE — 84165 PATHOLOGIST INTERPRETATION SPE: ICD-10-PCS | Mod: 26,,, | Performed by: PATHOLOGY

## 2020-01-06 PROCEDURE — 86334 IMMUNOFIX E-PHORESIS SERUM: CPT

## 2020-01-06 PROCEDURE — 86334 PATHOLOGIST INTERPRETATION IFE: ICD-10-PCS | Mod: 26,,, | Performed by: PATHOLOGY

## 2020-01-06 PROCEDURE — 85025 COMPLETE CBC W/AUTO DIFF WBC: CPT | Mod: PO

## 2020-01-06 PROCEDURE — 83520 IMMUNOASSAY QUANT NOS NONAB: CPT | Mod: 59

## 2020-01-06 PROCEDURE — 82232 ASSAY OF BETA-2 PROTEIN: CPT

## 2020-01-06 PROCEDURE — 83615 LACTATE (LD) (LDH) ENZYME: CPT

## 2020-01-06 PROCEDURE — 36415 COLL VENOUS BLD VENIPUNCTURE: CPT | Mod: PO

## 2020-01-06 PROCEDURE — 86334 IMMUNOFIX E-PHORESIS SERUM: CPT | Mod: 26,,, | Performed by: PATHOLOGY

## 2020-01-06 PROCEDURE — 80053 COMPREHEN METABOLIC PANEL: CPT

## 2020-01-06 PROCEDURE — 84165 PROTEIN E-PHORESIS SERUM: CPT

## 2020-01-06 PROCEDURE — 84165 PROTEIN E-PHORESIS SERUM: CPT | Mod: 26,,, | Performed by: PATHOLOGY

## 2020-01-07 LAB
ALBUMIN SERPL BCP-MCNC: 3.8 G/DL (ref 3.5–5.2)
ALBUMIN SERPL ELPH-MCNC: 4.18 G/DL (ref 3.35–5.55)
ALP SERPL-CCNC: 79 U/L (ref 55–135)
ALPHA1 GLOB SERPL ELPH-MCNC: 0.35 G/DL (ref 0.17–0.41)
ALPHA2 GLOB SERPL ELPH-MCNC: 0.91 G/DL (ref 0.43–0.99)
ALT SERPL W/O P-5'-P-CCNC: 18 U/L (ref 10–44)
ANION GAP SERPL CALC-SCNC: 16 MMOL/L (ref 8–16)
AST SERPL-CCNC: 37 U/L (ref 10–40)
B-GLOBULIN SERPL ELPH-MCNC: 0.77 G/DL (ref 0.5–1.1)
BILIRUB SERPL-MCNC: 0.6 MG/DL (ref 0.1–1)
BUN SERPL-MCNC: 51 MG/DL (ref 6–20)
CALCIUM SERPL-MCNC: 10.1 MG/DL (ref 8.7–10.5)
CHLORIDE SERPL-SCNC: 97 MMOL/L (ref 95–110)
CO2 SERPL-SCNC: 29 MMOL/L (ref 23–29)
CREAT SERPL-MCNC: 12 MG/DL (ref 0.5–1.4)
EST. GFR  (AFRICAN AMERICAN): 4.8 ML/MIN/1.73 M^2
EST. GFR  (NON AFRICAN AMERICAN): 4.2 ML/MIN/1.73 M^2
GAMMA GLOB SERPL ELPH-MCNC: 1.31 G/DL (ref 0.67–1.58)
GLUCOSE SERPL-MCNC: 102 MG/DL (ref 70–110)
INTERPRETATION SERPL IFE-IMP: NORMAL
KAPPA LC SER QL IA: 24.81 MG/DL (ref 0.33–1.94)
KAPPA LC/LAMBDA SER IA: 2.5 (ref 0.26–1.65)
LAMBDA LC SER QL IA: 9.93 MG/DL (ref 0.57–2.63)
LDH SERPL L TO P-CCNC: 157 U/L (ref 110–260)
PATHOLOGIST INTERPRETATION IFE: NORMAL
PATHOLOGIST INTERPRETATION SPE: NORMAL
POTASSIUM SERPL-SCNC: 4.6 MMOL/L (ref 3.5–5.1)
PROT SERPL-MCNC: 7.5 G/DL (ref 6–8.4)
PROT SERPL-MCNC: 7.8 G/DL (ref 6–8.4)
SODIUM SERPL-SCNC: 142 MMOL/L (ref 136–145)

## 2020-01-08 LAB — B2 MICROGLOB SERPL-MCNC: 17.8 UG/ML (ref 0–2.5)

## 2020-01-10 ENCOUNTER — OFFICE VISIT (OUTPATIENT)
Dept: HEMATOLOGY/ONCOLOGY | Facility: CLINIC | Age: 55
End: 2020-01-10
Payer: COMMERCIAL

## 2020-01-10 ENCOUNTER — TELEPHONE (OUTPATIENT)
Dept: FAMILY MEDICINE | Facility: CLINIC | Age: 55
End: 2020-01-10

## 2020-01-10 VITALS
HEART RATE: 79 BPM | SYSTOLIC BLOOD PRESSURE: 152 MMHG | RESPIRATION RATE: 24 BRPM | BODY MASS INDEX: 38.36 KG/M2 | WEIGHT: 315 LBS | TEMPERATURE: 99 F | DIASTOLIC BLOOD PRESSURE: 74 MMHG | HEIGHT: 76 IN | OXYGEN SATURATION: 95 %

## 2020-01-10 DIAGNOSIS — E66.01 MORBID OBESITY: ICD-10-CM

## 2020-01-10 DIAGNOSIS — N18.6 ESRD (END STAGE RENAL DISEASE) ON DIALYSIS: ICD-10-CM

## 2020-01-10 DIAGNOSIS — Z99.2 ESRD (END STAGE RENAL DISEASE) ON DIALYSIS: ICD-10-CM

## 2020-01-10 DIAGNOSIS — C90.01 MULTIPLE MYELOMA IN REMISSION: Primary | ICD-10-CM

## 2020-01-10 PROCEDURE — 99999 PR PBB SHADOW E&M-EST. PATIENT-LVL III: ICD-10-PCS | Mod: PBBFAC,,, | Performed by: INTERNAL MEDICINE

## 2020-01-10 PROCEDURE — 3008F BODY MASS INDEX DOCD: CPT | Mod: CPTII,S$GLB,, | Performed by: INTERNAL MEDICINE

## 2020-01-10 PROCEDURE — 99213 PR OFFICE/OUTPT VISIT, EST, LEVL III, 20-29 MIN: ICD-10-PCS | Mod: S$GLB,,, | Performed by: INTERNAL MEDICINE

## 2020-01-10 PROCEDURE — 99213 OFFICE O/P EST LOW 20 MIN: CPT | Mod: S$GLB,,, | Performed by: INTERNAL MEDICINE

## 2020-01-10 PROCEDURE — 99999 PR PBB SHADOW E&M-EST. PATIENT-LVL III: CPT | Mod: PBBFAC,,, | Performed by: INTERNAL MEDICINE

## 2020-01-10 PROCEDURE — 3008F PR BODY MASS INDEX (BMI) DOCUMENTED: ICD-10-PCS | Mod: CPTII,S$GLB,, | Performed by: INTERNAL MEDICINE

## 2020-01-10 NOTE — PROGRESS NOTES
HISTORY OF PRESENT ILLNESS:  A 52-year-old white gentleman well known to me for IgG lambda  multiple myeloma, which went into remission following induction therapy with ZRD   and a protracted course of maintenance Revlimid as the patient's paraprotein   levels were undetectable for long period of time.  He was taken off all therapy   and observed.  He has remained without evidence of relapse.  His course has been   complicated by nonhealing lower extremity wounds and underlying CKD.  Since last office evaluation, the patient developed severe volume overload and the development of end-stage renal disease which required institution of emergent hemodialysis.  Patient has occasional episodes of hypotension when fluid is being withdrawn during dialysis.  He continues to run for 5.5 hr 3 times per week.  Patient's weight has diminished.  Edema has improved.  Lower extremity wounds are healed.  Patient is not experiencing difficulties with fevers, chills, painful lymphadenopathy, night sweats, weight loss.  Av fistula has been placed in the right upper extremity.  No other pertinent findings on a 14 point review systems.    PHYSICAL EXAMINATION:  GENERAL:  Well-developed, morbidly obese, black gentleman in no acute distress, who is ambulating with the assistance of a cane.  VITAL SIGNS:  Documented in EMR and reviewed.  HEENT:  Normocephalic, atraumatic.  Oral mucosa pink and moist.  Lips without   lesions.  Tongue midline.  Oropharynx clear.  Nonicteric sclerae.   NECK:  Supple.  No adenopathy.  HEART:  Regular rate and rhythm without murmur, gallop or rub.               LUNGS:  Clear to auscultation bilaterally.                                  ABDOMEN:  Soft, nontender and nondistended with positive normoactive bowel   sounds.  No hepatosplenomegaly.  EXTREMITIES:  No cyanosis clubbing or edema.  Distal pulses intact but diminished.  Wounds healed.    Laboratory:  White count 4.9, hemoglobin 10.7, hematocrit 34.5,  platelets 252, absolute neutrophil count is 2600.  Sodium 142, potassium 4.6, chloride 97, CO2 29, BUN 51, creatinine 12, glucose 102, calcium 10.1, liver function test within normal limits .  Beta 2 microglobulin 17.8.  Serum protein electrophoresis is negative for monoclonal paraprotein spike.  Free light chain analysis is remarkable for  A kappa fraction of 24.81, a lambda fraction of 9.93, and a kappa/lambda ratio of 2.5.    IMPRESSION:  1.  IgG lambda Multiple myeloma -remains in remission.  2.  Morbid obesity.  3.  Lower extremity wounds resolved.  4.  End-stage renal disease requiring hemodialysis.    PLAN:  1.  Continued observation with the patient to return to clinic every 4 months to review labs including CBC, CMP, LDH, serum protein electrophoresis with immunofixation, beta 2 microglobulin, and free light chain analysis.    This note was created using voice recognition software and may contain grammatical errors.

## 2020-01-11 NOTE — TELEPHONE ENCOUNTER
BP Readings from Last 3 Encounters:   01/10/20 (!) 152/74   10/29/19 (!) 123/55   10/11/19 101/68     This patient's blood pressure was high in the specialist's clinic today. I am unsure if they rechecked the blood pressure according to standard protocol or not as they did not state this in the notes.  It also is not documented that they informed the patient about this or ask the patient to follow up with someone due to the high blood pressure.  Please schedule the patient for another appointment with us in our clinic to determine if this was accurate or not and to add medication to the patient's regimen if it is not in control.   This can be done with alberto.

## 2020-01-22 ENCOUNTER — OFFICE VISIT (OUTPATIENT)
Dept: FAMILY MEDICINE | Facility: CLINIC | Age: 55
End: 2020-01-22
Payer: COMMERCIAL

## 2020-01-22 VITALS
WEIGHT: 315 LBS | BODY MASS INDEX: 38.36 KG/M2 | HEIGHT: 76 IN | SYSTOLIC BLOOD PRESSURE: 116 MMHG | TEMPERATURE: 99 F | HEART RATE: 81 BPM | DIASTOLIC BLOOD PRESSURE: 62 MMHG

## 2020-01-22 DIAGNOSIS — I10 ESSENTIAL HYPERTENSION: Primary | ICD-10-CM

## 2020-01-22 PROCEDURE — 3008F PR BODY MASS INDEX (BMI) DOCUMENTED: ICD-10-PCS | Mod: CPTII,S$GLB,, | Performed by: NURSE PRACTITIONER

## 2020-01-22 PROCEDURE — 99214 PR OFFICE/OUTPT VISIT, EST, LEVL IV, 30-39 MIN: ICD-10-PCS | Mod: S$GLB,,, | Performed by: NURSE PRACTITIONER

## 2020-01-22 PROCEDURE — 99999 PR PBB SHADOW E&M-EST. PATIENT-LVL III: CPT | Mod: PBBFAC,,, | Performed by: NURSE PRACTITIONER

## 2020-01-22 PROCEDURE — 3008F BODY MASS INDEX DOCD: CPT | Mod: CPTII,S$GLB,, | Performed by: NURSE PRACTITIONER

## 2020-01-22 PROCEDURE — 99214 OFFICE O/P EST MOD 30 MIN: CPT | Mod: S$GLB,,, | Performed by: NURSE PRACTITIONER

## 2020-01-22 PROCEDURE — 99999 PR PBB SHADOW E&M-EST. PATIENT-LVL III: ICD-10-PCS | Mod: PBBFAC,,, | Performed by: NURSE PRACTITIONER

## 2020-01-22 RX ORDER — LOSARTAN POTASSIUM 50 MG/1
50 TABLET ORAL DAILY
Qty: 90 TABLET | Refills: 3 | Status: SHIPPED | OUTPATIENT
Start: 2020-01-22

## 2020-01-22 RX ORDER — CARVEDILOL 25 MG/1
25 TABLET ORAL 2 TIMES DAILY
Qty: 180 TABLET | Refills: 3 | Status: SHIPPED | OUTPATIENT
Start: 2020-01-22

## 2020-01-22 NOTE — PROGRESS NOTES
Subjective:       Patient ID: Leodan Dan is a 54 y.o. male.    Chief Complaint: Follow-up (bp)    Hypertension   This is a chronic problem. The current episode started more than 1 year ago. The problem is unchanged. The problem is controlled. Pertinent negatives include no chest pain, headaches, palpitations or shortness of breath. Past treatments include beta blockers, angiotensin blockers and calcium channel blockers. The current treatment provides significant improvement.       Review of Systems   Constitutional: Negative for fatigue, fever and unexpected weight change.   HENT: Negative for ear pain and sore throat.    Eyes: Negative.  Negative for pain and visual disturbance.   Respiratory: Negative for cough and shortness of breath.    Cardiovascular: Negative for chest pain and palpitations.   Gastrointestinal: Negative for abdominal pain, diarrhea, nausea and vomiting.   Genitourinary: Negative for dysuria and frequency.   Musculoskeletal: Negative for arthralgias and myalgias.   Skin: Negative for color change and rash.   Neurological: Negative for dizziness and headaches.   Psychiatric/Behavioral: Negative for sleep disturbance. The patient is not nervous/anxious.        Vitals:    01/22/20 1115   BP: 116/62   Pulse: 81   Temp: 98.5 °F (36.9 °C)       Objective:     Current Outpatient Medications   Medication Sig Dispense Refill    ascorbic acid (VITAMIN C) 1000 MG tablet Take 1,000 mg by mouth once daily.      aspirin (ECOTRIN) 81 MG EC tablet Take 81 mg by mouth once daily.      carvedilol (COREG) 25 MG tablet Take 1 tablet (25 mg total) by mouth 2 (two) times daily. 180 tablet 3    cinacalcet (SENSIPAR) 30 MG Tab TAKE 1 TABLET BY MOUTH THREE TIMES A WEEK  0    ferrous sulfate 134 mg (27 mg iron) Tab Take 65 mg by mouth once daily.       hydrALAZINE (APRESOLINE) 50 MG tablet Take 50 mg by mouth 3 (three) times daily.  4    losartan (COZAAR) 50 MG tablet Take 1 tablet (50 mg total) by mouth once  daily. 90 tablet 3    MEN'S MULTI-VITAMIN ORAL Take by mouth.      midodrine (PROAMATINE) 10 MG tablet Take 0.5 tablets (5 mg total) by mouth 2 (two) times daily. (Patient taking differently: Take 5 mg by mouth 2 (two) times daily as needed. ) 60 tablet 0    NIFEdipine (PROCARDIA-XL) 90 MG (OSM) 24 hr tablet Take 90 mg by mouth once daily.  3    pyridostigmine (MESTINON) 60 mg Tab Take 2 tablets (120 mg total) by mouth 3 (three) times daily. 180 tablet 2    telmisartan (MICARDIS) 80 MG Tab Take 80 mg by mouth once daily.      vitamin D (VITAMIN D3) 1000 units Tab Take 1,000 Units by mouth once daily.      amlodipine-benazepril 5-20 mg (LOTREL) 5-20 mg per capsule Take 1 capsule by mouth once daily.  1    diclofenac sodium (VOLTAREN) 1 % Gel Apply 2 g topically 3 (three) times daily. (Patient not taking: Reported on 1/22/2020) 100 g 0    ondansetron (ZOFRAN) 8 MG tablet Take 8 mg by mouth every 8 (eight) hours as needed.  0    pantoprazole (PROTONIX) 40 MG tablet Take 40 mg by mouth once daily.  1    sertraline (ZOLOFT) 50 MG tablet Take 50 mg by mouth once daily.  0    sevelamer carbonate (RENVELA) 800 mg Tab Take 2,400 mg by mouth 3 (three) times daily with meals.      sodium bicarbonate 650 MG tablet Take 1,300 mg by mouth 2 (two) times daily.  0    sucralfate (CARAFATE) 100 mg/mL suspension Take 1 g by mouth every 6 (six) hours.       No current facility-administered medications for this visit.        Physical Exam   Constitutional: He is oriented to person, place, and time. He appears well-developed. No distress.   obese   HENT:   Head: Normocephalic and atraumatic.   Eyes: Pupils are equal, round, and reactive to light. EOM are normal.   Neck: Normal range of motion. Neck supple.   Cardiovascular: Normal rate and regular rhythm.   BLE 1+ edema   Pulmonary/Chest: Effort normal and breath sounds normal.   Musculoskeletal: Normal range of motion.   Neurological: He is alert and oriented to person,  place, and time.   Skin: Skin is warm and dry. No rash noted.   Psychiatric: He has a normal mood and affect. Thought content normal.   Nursing note and vitals reviewed.      Assessment:       1. Essential hypertension        Plan:   Essential hypertension  -     Lipid panel; Future; Expected date: 01/22/2020    Other orders  -     losartan (COZAAR) 50 MG tablet; Take 1 tablet (50 mg total) by mouth once daily.  Dispense: 90 tablet; Refill: 3  -     carvedilol (COREG) 25 MG tablet; Take 1 tablet (25 mg total) by mouth 2 (two) times daily.  Dispense: 180 tablet; Refill: 3        No follow-ups on file.    There are no Patient Instructions on file for this visit.

## 2020-01-30 ENCOUNTER — OFFICE VISIT (OUTPATIENT)
Dept: ORTHOPEDICS | Facility: CLINIC | Age: 55
End: 2020-01-30
Payer: COMMERCIAL

## 2020-01-30 VITALS
SYSTOLIC BLOOD PRESSURE: 135 MMHG | DIASTOLIC BLOOD PRESSURE: 73 MMHG | BODY MASS INDEX: 38.36 KG/M2 | WEIGHT: 315 LBS | HEART RATE: 80 BPM | HEIGHT: 76 IN

## 2020-01-30 DIAGNOSIS — M17.11 PRIMARY OSTEOARTHRITIS OF RIGHT KNEE: Primary | ICD-10-CM

## 2020-01-30 PROCEDURE — 20610 DRAIN/INJ JOINT/BURSA W/O US: CPT | Mod: RT,S$GLB,, | Performed by: ORTHOPAEDIC SURGERY

## 2020-01-30 PROCEDURE — 99999 PR PBB SHADOW E&M-EST. PATIENT-LVL III: ICD-10-PCS | Mod: PBBFAC,,, | Performed by: ORTHOPAEDIC SURGERY

## 2020-01-30 PROCEDURE — 99214 PR OFFICE/OUTPT VISIT, EST, LEVL IV, 30-39 MIN: ICD-10-PCS | Mod: 25,S$GLB,, | Performed by: ORTHOPAEDIC SURGERY

## 2020-01-30 PROCEDURE — 20610 LARGE JOINT ASPIRATION/INJECTION: R KNEE: ICD-10-PCS | Mod: RT,S$GLB,, | Performed by: ORTHOPAEDIC SURGERY

## 2020-01-30 PROCEDURE — 99214 OFFICE O/P EST MOD 30 MIN: CPT | Mod: 25,S$GLB,, | Performed by: ORTHOPAEDIC SURGERY

## 2020-01-30 PROCEDURE — 99999 PR PBB SHADOW E&M-EST. PATIENT-LVL III: CPT | Mod: PBBFAC,,, | Performed by: ORTHOPAEDIC SURGERY

## 2020-01-30 PROCEDURE — 3008F BODY MASS INDEX DOCD: CPT | Mod: CPTII,S$GLB,, | Performed by: ORTHOPAEDIC SURGERY

## 2020-01-30 PROCEDURE — 3008F PR BODY MASS INDEX (BMI) DOCUMENTED: ICD-10-PCS | Mod: CPTII,S$GLB,, | Performed by: ORTHOPAEDIC SURGERY

## 2020-01-30 RX ORDER — DICLOFENAC SODIUM 10 MG/G
2 GEL TOPICAL 3 TIMES DAILY
Qty: 100 G | Refills: 3 | Status: SHIPPED | OUTPATIENT
Start: 2020-01-30

## 2020-01-30 RX ORDER — METHOCARBAMOL 500 MG/1
500 TABLET, FILM COATED ORAL 3 TIMES DAILY PRN
Qty: 44 TABLET | Refills: 0 | Status: SHIPPED | OUTPATIENT
Start: 2020-01-30

## 2020-01-30 RX ADMIN — TRIAMCINOLONE ACETONIDE 40 MG: 40 INJECTION, SUSPENSION INTRA-ARTICULAR; INTRAMUSCULAR at 08:01

## 2020-01-30 NOTE — PROCEDURES
Large Joint Aspiration/Injection: R knee  Date/Time: 1/30/2020 8:45 AM  Performed by: North Mccollum MD  Authorized by: North Mccollum MD     Consent Done?:  Yes (Verbal)  Indications:  Pain  Procedure site marked: Yes    Timeout: Prior to procedure the correct patient, procedure, and site was verified      Location:  Knee  Site:  R knee  Prep: Patient was prepped and draped in usual sterile fashion    Needle size:  21 G  Approach:  Anterolateral  Medications:  40 mg triamcinolone acetonide 40 mg/mL; 40 mg triamcinolone acetonide 40 mg/mL  Patient tolerance:  Patient tolerated the procedure well with no immediate complications

## 2020-01-30 NOTE — PROGRESS NOTES
Chief Complaint   Patient presents with    Right Knee - Pain         HPI:   This is a 54 y.o. who presents to clinic today complaining of right knee pain for years after no known trauma. Pain is progressively worsening. No numbness or tingling. No associated signs or symptoms.    Past Medical History:   Diagnosis Date    Acquired diverticulosis of colon     Allergy     Anemia     iron deficient,chronic, with paraprotenemia    Anticoagulant long-term use     Dyspnea on exertion     ED (erectile dysfunction) 05/15/12    Elevated liver enzymes     GERD (gastroesophageal reflux disease)     Hepatitis B core antibody positive 2012    Hyperglycemia     Hypertension     Hypogonadism male     Iron deficiency     Kidney stone     Left ventricular diastolic dysfunction with preserved systolic function     Multiple myeloma, stage 1 2012    Myasthenia gravis associated with thymoma     last exacerbation around early 2011, experienced weakness    Obesity     Prostatism 05/15/12    Renal insufficiency     sees Dr. Fernandez for this.     Sciatic nerve injury     Sleep apnea     He uses a CPAP for this. Room air    Spermatocele of epididymis, single 05/15/12    left sided cyst, had surgery    Ulcer 2011    peptic ulcer    Varicose veins     sometimes wears compression stockings     Past Surgical History:   Procedure Laterality Date    BONE MARROW BIOPSY N/A 8/19/2019    Procedure: BIOPSY, BONE MARROW;  Surgeon: Arias Iqbal MD;  Location: [facility];  Service: Oncology;  Laterality: N/A;    COLONOSCOPY  9/17/2012    was normal except for diverticulosis by Dr. Murdock     CYSTOSCOPY W/ DECANNULATION      DIALYSIS FISTULA CREATION      ESOPHAGOGASTRODUODENOSCOPY  2011    He had ulcers.    JOINT REPLACEMENT      knee    KNEE SURGERY      left    SPERMATOCELECTOMY  05/15/12    Left side    THYMECTOMY  2003    for myasthenia gravis     Current Outpatient Medications on File Prior to Visit   Medication  Sig Dispense Refill    amlodipine-benazepril 5-20 mg (LOTREL) 5-20 mg per capsule Take 1 capsule by mouth once daily.  1    ascorbic acid (VITAMIN C) 1000 MG tablet Take 1,000 mg by mouth once daily.      aspirin (ECOTRIN) 81 MG EC tablet Take 81 mg by mouth once daily.      carvedilol (COREG) 25 MG tablet Take 1 tablet (25 mg total) by mouth 2 (two) times daily. 180 tablet 3    cinacalcet (SENSIPAR) 30 MG Tab TAKE 1 TABLET BY MOUTH THREE TIMES A WEEK  0    ferrous sulfate 134 mg (27 mg iron) Tab Take 65 mg by mouth once daily.       hydrALAZINE (APRESOLINE) 50 MG tablet Take 50 mg by mouth 3 (three) times daily.  4    losartan (COZAAR) 50 MG tablet Take 1 tablet (50 mg total) by mouth once daily. 90 tablet 3    MEN'S MULTI-VITAMIN ORAL Take by mouth.      midodrine (PROAMATINE) 10 MG tablet Take 0.5 tablets (5 mg total) by mouth 2 (two) times daily. (Patient taking differently: Take 5 mg by mouth 2 (two) times daily as needed. ) 60 tablet 0    NIFEdipine (PROCARDIA-XL) 90 MG (OSM) 24 hr tablet Take 90 mg by mouth once daily.  3    ondansetron (ZOFRAN) 8 MG tablet Take 8 mg by mouth every 8 (eight) hours as needed.  0    pantoprazole (PROTONIX) 40 MG tablet Take 40 mg by mouth once daily.  1    pyridostigmine (MESTINON) 60 mg Tab Take 2 tablets (120 mg total) by mouth 3 (three) times daily. 180 tablet 2    sertraline (ZOLOFT) 50 MG tablet Take 50 mg by mouth once daily.  0    sevelamer carbonate (RENVELA) 800 mg Tab Take 2,400 mg by mouth 3 (three) times daily with meals.      sodium bicarbonate 650 MG tablet Take 1,300 mg by mouth 2 (two) times daily.  0    sucralfate (CARAFATE) 100 mg/mL suspension Take 1 g by mouth every 6 (six) hours.      telmisartan (MICARDIS) 80 MG Tab Take 80 mg by mouth once daily.      vitamin D (VITAMIN D3) 1000 units Tab Take 1,000 Units by mouth once daily.      [DISCONTINUED] diclofenac sodium (VOLTAREN) 1 % Gel Apply 2 g topically 3 (three) times daily. (Patient  not taking: Reported on 1/22/2020) 100 g 0     No current facility-administered medications on file prior to visit.      Review of patient's allergies indicates:   Allergen Reactions    Hydrocodone-acetaminophen Other (See Comments)     Weak  & dizziness  Weak  & dizziness  Nausea, dizzy  Takes apap      Ibuprofen      Motrin problems    Lisinopril      Other reaction(s): chronic cough  cough    Nebivolol Other (See Comments)     Severe hypotension  Heart rate dropped to 39       Family History   Problem Relation Age of Onset    Diabetes Mother     Hypertension Mother     Heart failure Mother     Diabetes Mellitus Mother     Prostate cancer Father     Hypertension Father     Lung cancer Father     Diabetes Sister     Hypertension Sister     Diabetes Mellitus Sister     Prostate cancer Paternal Uncle     Hypertension Brother     Stroke Neg Hx     Heart attack Neg Hx      Social History     Socioeconomic History    Marital status:      Spouse name: Not on file    Number of children: Not on file    Years of education: Not on file    Highest education level: Not on file   Occupational History    Not on file   Social Needs    Financial resource strain: Not on file    Food insecurity:     Worry: Not on file     Inability: Not on file    Transportation needs:     Medical: Not on file     Non-medical: Not on file   Tobacco Use    Smoking status: Never Smoker    Smokeless tobacco: Never Used   Substance and Sexual Activity    Alcohol use: No    Drug use: No    Sexual activity: Not on file   Lifestyle    Physical activity:     Days per week: Not on file     Minutes per session: Not on file    Stress: Not on file   Relationships    Social connections:     Talks on phone: Not on file     Gets together: Not on file     Attends Gnosticist service: Not on file     Active member of club or organization: Not on file     Attends meetings of clubs or organizations: Not on file     Relationship  status: Not on file   Other Topics Concern    Not on file   Social History Narrative    Not on file       Review of Systems:  Constitutional:  Denies fever or chills   Eyes:  Denies change in visual acuity   HENT:  Denies nasal congestion or sore throat   Respiratory:  Denies cough or shortness of breath   Cardiovascular:  Denies chest pain or edema   GI:  Denies abdominal pain, nausea, vomiting, bloody stools or diarrhea   :  Denies dysuria   Integument:  Denies rash   Neurologic:  Denies headache, focal weakness or sensory changes   Endocrine:  Denies polyuria or polydipsia   Lymphatic:  Denies swollen glands   Psychiatric:  Denies depression or anxiety     Physical Exam:   Constitutional:  Well developed, well nourished, no acute distress, non-toxic appearance   Integument:  Well hydrated, no rash   Lymphatic:  No lymphadenopathy noted   Neurologic:  Alert & oriented x 3, CN 2-12 normal, normal motor function, normal sensory function, no focal deficits noted   Psychiatric:  Speech and behavior appropriate   Eyes: EOMI  Gi: abdomen soft    Bilateral Knee Exam    right Knee Exam     Tenderness   The patient is experiencing tenderness in the medial joint line.    Range of Motion   Extension: abnormal   Flexion: abnormal     Muscle Strength     The patient has normal knee strength.    Tests   Enma:  Medial - positive   Lachman:  Anterior - negative      Varus: negative  Valgus: negative  Patellar Apprehension: negative    Other   Erythema: absent  Sensation: normal  Pulse: present  Swelling: mild      left Knee Exam   left knee exam performed same as contralateral side and is normal.          Primary osteoarthritis of right knee  -     Large Joint Aspiration/Injection: R knee    Other orders  -     diclofenac sodium (VOLTAREN) 1 % Gel; Apply 2 g topically 3 (three) times daily.  Dispense: 100 g; Refill: 3  -     methocarbamol (ROBAXIN) 500 MG Tab; Take 1 tablet (500 mg total) by mouth 3 (three) times daily as  needed.  Dispense: 44 tablet; Refill: 0            Using an aseptic technique, I injected 5 cc of lidocaine 1% without and 1 cc of kenalog 40mg into the right knee. The patient tolerated this well. I will have them return to clinic in 3 months.

## 2020-02-05 RX ORDER — TRIAMCINOLONE ACETONIDE 40 MG/ML
40 INJECTION, SUSPENSION INTRA-ARTICULAR; INTRAMUSCULAR
Status: DISCONTINUED | OUTPATIENT
Start: 2020-01-30 | End: 2020-02-05 | Stop reason: HOSPADM

## 2020-02-25 NOTE — PROGRESS NOTES
Subjective:       Patient ID: Leodan Dan is a 54 y.o. male.    Chief Complaint: Pre-op Exam    HPI     he comes in for preop clearance. he is scheduled to have surgical correction of phimosis done by Dr. Conrado Lackey, surgery will be scheduled in the next month after clearance is sent.  Medical history includes hypertension, ESRD with dialysis, myasthenia gravis, anemia, iron deficiency, multiple myeloma in remission, GERD, and morbid obesity. Preop testing includes CBC, CMP, CXR, EKG . he denies shortness of breath or chest pain, no dizziness or syncope, no headaches or blurry vision. Denies fever. heis not currently on blood thinners. He has already been given cardiac clearance from Dr Taylor.        Review of Systems   Constitutional: Negative for fatigue, fever and unexpected weight change.   HENT: Negative for ear pain and sore throat.    Eyes: Negative.  Negative for pain and visual disturbance.   Respiratory: Negative for cough and shortness of breath.    Cardiovascular: Negative for chest pain and palpitations.   Gastrointestinal: Negative for abdominal pain, diarrhea, nausea and vomiting.   Genitourinary: Negative for dysuria and frequency.   Musculoskeletal: Negative for arthralgias and myalgias.   Skin: Negative for color change and rash.   Neurological: Negative for dizziness and headaches.   Psychiatric/Behavioral: Negative for sleep disturbance. The patient is not nervous/anxious.        Vitals:    02/26/20 1057   BP: 112/80   Pulse: 64   Temp: 98.8 °F (37.1 °C)       Objective:     Current Outpatient Medications   Medication Sig Dispense Refill    amlodipine-benazepril 5-20 mg (LOTREL) 5-20 mg per capsule Take 1 capsule by mouth once daily.  1    ascorbic acid (VITAMIN C) 1000 MG tablet Take 1,000 mg by mouth once daily.      aspirin (ECOTRIN) 81 MG EC tablet Take 81 mg by mouth once daily.      carvedilol (COREG) 25 MG tablet Take 1 tablet (25 mg total) by mouth 2 (two) times daily. 180 tablet  3    cinacalcet (SENSIPAR) 30 MG Tab TAKE 1 TABLET BY MOUTH THREE TIMES A WEEK  0    ferrous sulfate 134 mg (27 mg iron) Tab Take 65 mg by mouth once daily.       hydrALAZINE (APRESOLINE) 50 MG tablet Take 50 mg by mouth 3 (three) times daily.  4    losartan (COZAAR) 50 MG tablet Take 1 tablet (50 mg total) by mouth once daily. 90 tablet 3    midodrine (PROAMATINE) 10 MG tablet Take 0.5 tablets (5 mg total) by mouth 2 (two) times daily. (Patient taking differently: Take 5 mg by mouth 2 (two) times daily as needed. ) 60 tablet 0    NIFEdipine (PROCARDIA-XL) 90 MG (OSM) 24 hr tablet Take 90 mg by mouth once daily.  3    pyridostigmine (MESTINON) 60 mg Tab Take 2 tablets (120 mg total) by mouth 3 (three) times daily. 180 tablet 2    sertraline (ZOLOFT) 50 MG tablet Take 50 mg by mouth once daily.  0    sevelamer carbonate (RENVELA) 800 mg Tab Take 2,400 mg by mouth 3 (three) times daily with meals.      telmisartan (MICARDIS) 80 MG Tab Take 80 mg by mouth once daily.      vitamin D (VITAMIN D3) 1000 units Tab Take 1,000 Units by mouth once daily.      diclofenac sodium (VOLTAREN) 1 % Gel Apply 2 g topically 3 (three) times daily. (Patient not taking: Reported on 2/26/2020) 100 g 3    MEN'S MULTI-VITAMIN ORAL Take by mouth.      methocarbamol (ROBAXIN) 500 MG Tab Take 1 tablet (500 mg total) by mouth 3 (three) times daily as needed. (Patient not taking: Reported on 2/26/2020) 44 tablet 0    ondansetron (ZOFRAN) 8 MG tablet Take 8 mg by mouth every 8 (eight) hours as needed.  0    pantoprazole (PROTONIX) 40 MG tablet Take 40 mg by mouth once daily.  1    sodium bicarbonate 650 MG tablet Take 1,300 mg by mouth 2 (two) times daily.  0    sucralfate (CARAFATE) 100 mg/mL suspension Take 1 g by mouth every 6 (six) hours.       No current facility-administered medications for this visit.        Physical Exam   Constitutional: He is oriented to person, place, and time. He appears well-developed. No distress.    Morbidly obese   HENT:   Head: Normocephalic and atraumatic.   Eyes: Pupils are equal, round, and reactive to light. EOM are normal.   Neck: Normal range of motion. Neck supple.   Cardiovascular: Normal rate and regular rhythm.   Pulmonary/Chest: Effort normal and breath sounds normal.   Musculoskeletal: Normal range of motion.   Neurological: He is alert and oriented to person, place, and time.   Skin: Skin is warm and dry. No rash noted.   Psychiatric: He has a normal mood and affect. Thought content normal.   Nursing note and vitals reviewed.      Assessment:       1. Preoperative clearance        Plan:   Preoperative clearance  -     X-Ray Chest PA And Lateral; Future; Expected date: 02/26/2020    I have reviewed CBC, CMP, and CXR. He is cleared for surgery pending cardiac clearance from Dr Taylor.     No follow-ups on file.    There are no Patient Instructions on file for this visit.

## 2020-02-26 ENCOUNTER — HOSPITAL ENCOUNTER (OUTPATIENT)
Dept: RADIOLOGY | Facility: HOSPITAL | Age: 55
Discharge: HOME | End: 2020-02-26
Attending: NURSE PRACTITIONER
Payer: COMMERCIAL

## 2020-02-26 ENCOUNTER — OFFICE VISIT (OUTPATIENT)
Dept: FAMILY MEDICINE | Facility: CLINIC | Age: 55
End: 2020-02-26
Payer: COMMERCIAL

## 2020-02-26 VITALS
TEMPERATURE: 99 F | HEART RATE: 64 BPM | WEIGHT: 315 LBS | BODY MASS INDEX: 38.36 KG/M2 | DIASTOLIC BLOOD PRESSURE: 80 MMHG | HEIGHT: 76 IN | SYSTOLIC BLOOD PRESSURE: 112 MMHG

## 2020-02-26 DIAGNOSIS — Z01.818 PREOPERATIVE CLEARANCE: Primary | ICD-10-CM

## 2020-02-26 DIAGNOSIS — Z01.818 PREOPERATIVE CLEARANCE: ICD-10-CM

## 2020-02-26 PROCEDURE — 71046 XR CHEST PA AND LATERAL: ICD-10-PCS | Mod: 26,,, | Performed by: RADIOLOGY

## 2020-02-26 PROCEDURE — 3008F PR BODY MASS INDEX (BMI) DOCUMENTED: ICD-10-PCS | Mod: CPTII,S$GLB,, | Performed by: NURSE PRACTITIONER

## 2020-02-26 PROCEDURE — 99999 PR PBB SHADOW E&M-EST. PATIENT-LVL III: CPT | Mod: PBBFAC,,, | Performed by: NURSE PRACTITIONER

## 2020-02-26 PROCEDURE — 99214 OFFICE O/P EST MOD 30 MIN: CPT | Mod: S$GLB,,, | Performed by: NURSE PRACTITIONER

## 2020-02-26 PROCEDURE — 99214 PR OFFICE/OUTPT VISIT, EST, LEVL IV, 30-39 MIN: ICD-10-PCS | Mod: S$GLB,,, | Performed by: NURSE PRACTITIONER

## 2020-02-26 PROCEDURE — 71046 X-RAY EXAM CHEST 2 VIEWS: CPT | Mod: TC,PO

## 2020-02-26 PROCEDURE — 99999 PR PBB SHADOW E&M-EST. PATIENT-LVL III: ICD-10-PCS | Mod: PBBFAC,,, | Performed by: NURSE PRACTITIONER

## 2020-02-26 PROCEDURE — 3008F BODY MASS INDEX DOCD: CPT | Mod: CPTII,S$GLB,, | Performed by: NURSE PRACTITIONER

## 2020-02-26 PROCEDURE — 71046 X-RAY EXAM CHEST 2 VIEWS: CPT | Mod: 26,,, | Performed by: RADIOLOGY

## 2020-04-27 ENCOUNTER — TELEPHONE (OUTPATIENT)
Dept: ORTHOPEDICS | Facility: CLINIC | Age: 55
End: 2020-04-27

## 2020-04-27 NOTE — TELEPHONE ENCOUNTER
Called pt. Due to COVID-19 restrictions and for patient safety, appt on 4/30/2020 needs to be rescheduled. Rescheduled to 6/4/2020 with Dr. Mccollum. ThanksStefanie

## 2020-05-04 ENCOUNTER — TELEPHONE (OUTPATIENT)
Dept: FAMILY MEDICINE | Facility: CLINIC | Age: 55
End: 2020-05-04

## 2020-05-04 RX ORDER — PYRIDOSTIGMINE BROMIDE 60 MG/1
TABLET ORAL
Qty: 180 TABLET | Refills: 0 | Status: SHIPPED | OUTPATIENT
Start: 2020-05-04

## 2020-05-05 ENCOUNTER — NURSE TRIAGE (OUTPATIENT)
Dept: ADMINISTRATIVE | Facility: CLINIC | Age: 55
End: 2020-05-05

## 2020-05-05 ENCOUNTER — OFFICE VISIT (OUTPATIENT)
Dept: FAMILY MEDICINE | Facility: CLINIC | Age: 55
End: 2020-05-05
Payer: COMMERCIAL

## 2020-05-05 DIAGNOSIS — R50.9 FEVER, UNSPECIFIED FEVER CAUSE: Primary | ICD-10-CM

## 2020-05-05 PROCEDURE — 99499 NO LOS: ICD-10-PCS | Mod: ,,, | Performed by: FAMILY MEDICINE

## 2020-05-05 PROCEDURE — 99499 UNLISTED E&M SERVICE: CPT | Mod: ,,, | Performed by: FAMILY MEDICINE

## 2020-05-05 NOTE — TELEPHONE ENCOUNTER
Completed all but 20 min of dialysis this morning, toward end of treatment started feeling weak and dizzy. Pt reports body aches, chills, sore throat, nausea and nonproductive cough worsening since leaving dialysis. Pt states no fever at noon when he left dialysis. Denies chest pain or SOB. Pt requesting to be seen today. Pt appears in NAD, pt mostly concerned regarding body aches and chills. Offered pt virtual visit. Advised pt if symptoms worsen, he starts having chest pain or SOB, or he think he needs to be seen urgently to go to ED or call 911. Pt verbalized understanding.     Reason for Disposition   SEVERE coughing spells (e.g., whooping sound after coughing, vomiting after coughing)    Additional Information   Negative: Severe difficulty breathing (e.g., struggling for each breath, speaks in single words)   Negative: Bluish (or gray) lips or face now   Negative: [1] Rapid onset of cough AND [2] has hives   Negative: Coughing started suddenly after medicine, an allergic food or bee sting   Negative: [1] Difficulty breathing AND [2] exposure to flames, smoke, or fumes   Negative: [1] Stridor AND [2] difficulty breathing   Negative: Sounds like a life-threatening emergency to the triager   Negative: Choked on object of food that could be caught in the throat   Negative: Chest pain is main symptom   Negative: [1] Previous asthma attacks AND [2] this feels like asthma attack   Negative: Cough lasts > 3 weeks   Negative: Wet (productive) cough (i.e., white-yellow, yellow, green, or crystal colored sputum)   Negative: Chest pain  (Exception: MILD central chest pain, present only when coughing)   Negative: Difficulty breathing   Negative: Patient sounds very sick or weak to the triager   Negative: [1] Coughed up blood AND [2] > 1 tablespoon (15 ml) (Exception: blood-tinged sputum)   Negative: Fever > 103 F (39.4 C)   Negative: [1] Fever > 101 F (38.3 C) AND [2] age > 60   Negative: [1] Fever > 100.0  F (37.8 C) AND [2] bedridden (e.g., nursing home patient, CVA, chronic illness, recovering from surgery)   Negative: [1] Fever > 100.0 F (37.8 C) AND [2] diabetes mellitus or weak immune system (e.g., HIV positive, cancer chemo, splenectomy, chronic steroids)   Negative: Wheezing is present   Negative: [1] Ankle swelling AND [2] swelling is increasing    Protocols used: COUGH - ACUTE NON-PRODUCTIVE-A-AH

## 2020-05-05 NOTE — PROGRESS NOTES
Established Patient - Audio Only Telehealth Visit     The patient location is: Home  The chief complaint leading to consultation is:Fever   Visit type: Virtual visit with audio only (telephone)  Total time spent with patient:10 min       The reason for the audio only service rather than synchronous audio and video virtual visit was related to technical difficulties or patient preference/necessity.     Each patient to whom I provide medical services by telemedicine is:  (1) informed of the relationship between the physician and patient and the respective role of any other health care provider with respect to management of the patient; and (2) notified that they may decline to receive medical services by telemedicine and may withdraw from such care at any time. Patient verbally consented to receive this service via voice-only telephone call.       HPI: Sudden onset this am body aches chills fever cough and sl dyspnea.     Assessment and plan:  Febrile illness  Rec ER eval for Covid                        This service was not originating from a related E/M service provided within the previous 7 days nor will  to an E/M service or procedure within the next 24 hours or my soonest available appointment.  Prevailing standard of care was able to be met in this audio-only visit.

## 2020-05-13 ENCOUNTER — OFFICE VISIT (OUTPATIENT)
Dept: FAMILY MEDICINE | Facility: CLINIC | Age: 55
End: 2020-05-13
Payer: COMMERCIAL

## 2020-05-13 VITALS
HEIGHT: 76 IN | HEART RATE: 79 BPM | TEMPERATURE: 99 F | WEIGHT: 315 LBS | DIASTOLIC BLOOD PRESSURE: 51 MMHG | SYSTOLIC BLOOD PRESSURE: 93 MMHG | BODY MASS INDEX: 38.36 KG/M2

## 2020-05-13 DIAGNOSIS — I95.9 HYPOTENSION, UNSPECIFIED HYPOTENSION TYPE: ICD-10-CM

## 2020-05-13 DIAGNOSIS — M79.2 NEURALGIA: Primary | ICD-10-CM

## 2020-05-13 PROCEDURE — 99999 PR PBB SHADOW E&M-EST. PATIENT-LVL III: CPT | Mod: PBBFAC,,, | Performed by: NURSE PRACTITIONER

## 2020-05-13 PROCEDURE — 99214 OFFICE O/P EST MOD 30 MIN: CPT | Mod: S$GLB,,, | Performed by: NURSE PRACTITIONER

## 2020-05-13 PROCEDURE — 99999 PR PBB SHADOW E&M-EST. PATIENT-LVL III: ICD-10-PCS | Mod: PBBFAC,,, | Performed by: NURSE PRACTITIONER

## 2020-05-13 PROCEDURE — 3008F BODY MASS INDEX DOCD: CPT | Mod: CPTII,S$GLB,, | Performed by: NURSE PRACTITIONER

## 2020-05-13 PROCEDURE — 3008F PR BODY MASS INDEX (BMI) DOCUMENTED: ICD-10-PCS | Mod: CPTII,S$GLB,, | Performed by: NURSE PRACTITIONER

## 2020-05-13 PROCEDURE — 99214 PR OFFICE/OUTPT VISIT, EST, LEVL IV, 30-39 MIN: ICD-10-PCS | Mod: S$GLB,,, | Performed by: NURSE PRACTITIONER

## 2020-05-13 RX ORDER — GABAPENTIN 300 MG/1
300 CAPSULE ORAL EVERY MORNING
Qty: 30 CAPSULE | Refills: 2 | Status: SHIPPED | OUTPATIENT
Start: 2020-05-13 | End: 2021-05-13

## 2020-05-23 NOTE — PROGRESS NOTES
Subjective:       Patient ID: Leodan Dan is a 54 y.o. male.    Chief Complaint: Leg Pain (from skin brandi )    Leg Pain    The incident occurred more than 1 week ago. Injury mechanism: Previous skin graft. The quality of the pain is described as burning and shooting. The pain is at a severity of 6/10. The pain has been constant since onset. Associated symptoms include numbness and tingling. Nothing aggravates the symptoms. He has tried acetaminophen, ice and heat (voltaren gel, robaxin) for the symptoms. The treatment provided no relief.     Blood pressure is low in the office today, he reports that his blood pressure has been dropping frequently.  During dialysis he has episodes where he passes out.  This is been ongoing for the last 1-2 weeks since he was discharged from rehab.  When questioned he admits that he has been going to a new dialysis unit and has a new dialysis technician.  His dialysis facility will be opening next week.  He normally dialyzes for 5-6 hours, they have been running him over 3-4 hours.  They have been pulling the same amount of fluid in a shorter period of time.  On non dialysis days his blood pressure does return to normal.    Review of Systems   Constitutional: Negative for fatigue, fever and unexpected weight change.   HENT: Negative for ear pain and sore throat.    Eyes: Negative.  Negative for pain and visual disturbance.   Respiratory: Negative for cough and shortness of breath.    Cardiovascular: Negative for chest pain and palpitations.   Gastrointestinal: Negative for abdominal pain, diarrhea, nausea and vomiting.   Genitourinary: Negative for dysuria and frequency.   Musculoskeletal: Negative for arthralgias and myalgias.   Skin: Negative for color change and rash.   Neurological: Positive for tingling and numbness. Negative for dizziness and headaches.   Psychiatric/Behavioral: Negative for sleep disturbance. The patient is not nervous/anxious.        Vitals:    05/13/20 1041    BP: (!) 93/51   Pulse: 79   Temp: 99.2 °F (37.3 °C)       Objective:     Current Outpatient Medications   Medication Sig Dispense Refill    ascorbic acid (VITAMIN C) 1000 MG tablet Take 1,000 mg by mouth once daily.      aspirin (ECOTRIN) 81 MG EC tablet Take 81 mg by mouth once daily.      carvedilol (COREG) 25 MG tablet Take 1 tablet (25 mg total) by mouth 2 (two) times daily. 180 tablet 3    cinacalcet (SENSIPAR) 30 MG Tab TAKE 1 TABLET BY MOUTH THREE TIMES A WEEK  0    ferrous sulfate 134 mg (27 mg iron) Tab Take 65 mg by mouth once daily.       hydrALAZINE (APRESOLINE) 50 MG tablet Take 50 mg by mouth 3 (three) times daily.  4    losartan (COZAAR) 50 MG tablet Take 1 tablet (50 mg total) by mouth once daily. 90 tablet 3    MEN'S MULTI-VITAMIN ORAL Take by mouth.      midodrine (PROAMATINE) 10 MG tablet Take 0.5 tablets (5 mg total) by mouth 2 (two) times daily. (Patient taking differently: Take 5 mg by mouth 2 (two) times daily as needed. ) 60 tablet 0    NIFEdipine (PROCARDIA-XL) 90 MG (OSM) 24 hr tablet Take 90 mg by mouth once daily.  3    pyridostigmine (MESTINON) 60 mg Tab TAKE 2 TABLETS BY MOUTH THREE TIMES DAILY 180 tablet 0    sevelamer carbonate (RENVELA) 800 mg Tab Take 2,400 mg by mouth 3 (three) times daily with meals.      vitamin D (VITAMIN D3) 1000 units Tab Take 1,000 Units by mouth once daily.      diclofenac sodium (VOLTAREN) 1 % Gel Apply 2 g topically 3 (three) times daily. (Patient not taking: Reported on 2/26/2020) 100 g 3    gabapentin (NEURONTIN) 300 MG capsule Take 1 capsule (300 mg total) by mouth every morning. 30 capsule 2    methocarbamol (ROBAXIN) 500 MG Tab Take 1 tablet (500 mg total) by mouth 3 (three) times daily as needed. (Patient not taking: Reported on 2/26/2020) 44 tablet 0    ondansetron (ZOFRAN) 8 MG tablet Take 8 mg by mouth every 8 (eight) hours as needed.  0    pantoprazole (PROTONIX) 40 MG tablet Take 40 mg by mouth once daily.  1    sertraline  (ZOLOFT) 50 MG tablet Take 50 mg by mouth once daily.  0    sodium bicarbonate 650 MG tablet Take 1,300 mg by mouth 2 (two) times daily.  0    sucralfate (CARAFATE) 100 mg/mL suspension Take 1 g by mouth every 6 (six) hours.       No current facility-administered medications for this visit.        Physical Exam   Constitutional: He is oriented to person, place, and time. He appears well-developed. No distress.   obese   HENT:   Head: Normocephalic and atraumatic.   Eyes: Pupils are equal, round, and reactive to light. EOM are normal.   Neck: Normal range of motion. Neck supple.   Cardiovascular: Normal rate and regular rhythm.   BLE 2+ edema, skin is warm and dry   Pulmonary/Chest: Effort normal and breath sounds normal.   Musculoskeletal: Normal range of motion.   Neurological: He is alert and oriented to person, place, and time.   Skin: Skin is warm and dry. No rash noted.   Psychiatric: He has a normal mood and affect. Thought content normal.   Nursing note and vitals reviewed.      Assessment:       1. Neuralgia    2. Hypotension, unspecified hypotension type        Plan:   Neuralgia    Hypotension, unspecified hypotension type    Other orders  -     gabapentin (NEURONTIN) 300 MG capsule; Take 1 capsule (300 mg total) by mouth every morning.  Dispense: 30 capsule; Refill: 2     He is going to continue monitoring his blood pressure, I believe these hypotensive episodes and episodes of near-syncope that occurring during dialysis are related to a change in facilities and technicians.  If he continues to have symptoms after returning to his usual facility he will follow-up.  I feel like once his dialysis is extended over a longer period of time he will be able to tolerated again and these hypotensive episodes will not occur.  In the interim he is going to hold his losartan prior to dialysis.    No follow-ups on file.    There are no Patient Instructions on file for this visit.

## 2020-06-08 ENCOUNTER — OFFICE VISIT (OUTPATIENT)
Dept: FAMILY MEDICINE | Facility: CLINIC | Age: 55
End: 2020-06-08
Payer: COMMERCIAL

## 2020-06-08 VITALS
WEIGHT: 315 LBS | DIASTOLIC BLOOD PRESSURE: 88 MMHG | SYSTOLIC BLOOD PRESSURE: 160 MMHG | TEMPERATURE: 99 F | HEART RATE: 71 BPM | HEIGHT: 76 IN | BODY MASS INDEX: 38.36 KG/M2

## 2020-06-08 DIAGNOSIS — K58.0 IRRITABLE BOWEL SYNDROME WITH DIARRHEA: Primary | ICD-10-CM

## 2020-06-08 PROCEDURE — 99213 PR OFFICE/OUTPT VISIT, EST, LEVL III, 20-29 MIN: ICD-10-PCS | Mod: S$GLB,,, | Performed by: NURSE PRACTITIONER

## 2020-06-08 PROCEDURE — 99999 PR PBB SHADOW E&M-EST. PATIENT-LVL V: ICD-10-PCS | Mod: PBBFAC,,, | Performed by: NURSE PRACTITIONER

## 2020-06-08 PROCEDURE — 99213 OFFICE O/P EST LOW 20 MIN: CPT | Mod: S$GLB,,, | Performed by: NURSE PRACTITIONER

## 2020-06-08 PROCEDURE — 99999 PR PBB SHADOW E&M-EST. PATIENT-LVL V: CPT | Mod: PBBFAC,,, | Performed by: NURSE PRACTITIONER

## 2020-06-08 RX ORDER — SERTRALINE HYDROCHLORIDE 25 MG/1
25 TABLET, FILM COATED ORAL DAILY
Qty: 30 TABLET | Refills: 11 | Status: SHIPPED | OUTPATIENT
Start: 2020-06-08 | End: 2021-06-08

## 2020-06-08 NOTE — PATIENT INSTRUCTIONS
Over the counter IB guard and probiotics for chronic diarrhea related to IBS          Irritable Bowel Syndrome    Irritable bowel syndrome (IBS) is a disorder of the intestines. It is not a disease, but a group of symptoms caused by changes in the way the intestines work. It is fairly common, but the cause is not well understood.  Symptoms of IBS include:  · Abdominal pain, discomfort, and cramping  · Diarrhea  · Constipation or dry, hard stools  · Mucous stool  · Bloating  · Feeling of incomplete bowel movements  It usually results in one of 3 patterns of symptoms:  · Chronic abdominal pain and constipation  · Recurring episodes of diarrhea, with or without pain  · Alternating diarrhea and constipation  Home care  The goal of treatment is to control and relieve your symptoms, so you can lead a full and active life. There is no cure for IBS. But it can be managed.  Diet  Your diet did not cause your IBS, but it can affect it. No one diet works for everyone. Finding the best foods for you may take trial and error. Keep a food log to help find what foods made your symptoms worse. Below are some tips that may help you.  · Eat more slowly. Eat smaller amounts at a time, but more often. Remember, you can always eat more, but cannot eat less once youve eaten too much.  · High-fiber foods are complicated. While they may help relieve constipation, they can make your bloating, cramping, gas, and diarrhea worse.  · Eat less sugar.  · Try cutting out dairy products. Sometimes this helps.  · Try cutting out foods that are high in fat and fatty meats.  · You can control bloating or passing excess gas. Be careful with gassy vegetables and fruits like beans, cabbage, broccoli, and cauliflower.  · Be careful with carbonated beverages and fruit juices. They can make your bloating and diarrhea worse.  · Caffeine, alcohol, and stimulants can make symptoms worse. These include coffee, tea, sodas, energy drinks, and  chocolate.  Lifestyle  · Look for factors that seem to worsen your symptoms. These include stress and emotions.   · Although stress does not cause IBS, it may trigger flare-ups. Counseling can help you learn to handle stress. So can self-help measures like exercise, yoga, and meditation.  · Depression can occur along with IBS. Your healthcare provider may prescribe antidepressant medicine. This may help with diarrhea, constipation, and cramping, as well as with symptoms of depression.  · Smoking doesn't cause IBS, but can make the symptoms worse.  Medicines  Your healthcare provider may prescribe medicines. Take them as directed. For acute flare-ups of your illness, your provider may give you prescription medicines.  · Check with your healthcare provider before taking any medicines for diarrhea.  · Avoid anti-inflammatory medicines like ibuprofen or naproxen.  · Consider nutritional supplements. This is especially true if your diarrhea is prolonged, or you aren't eating or are losing weight  Follow-up care  Follow up with your healthcare provider, or as advised. If a stool sample was taken or cultures were done, you will be told if your treatment needs to change. You can call as directed for the results.  When to seek medical advice  Call your healthcare provider right away if any of these occur:  · Abdominal pain gets worse  · Constant abdominal pain moves to the right-lower abdomen  · You can't keep liquids down because of vomiting  · You have severe diarrhea  · You have blood (red or black color) or mucus in your stool  · You feel very weak or dizzy, faint, or have extreme thirst  · You have a fever of 100.4ºF (38.0ºC) or higher, or as directed by your healthcare provider  Date Last Reviewed: 8/31/2015  © 0248-6945 [employer]. [address] [place] [address], [place] [address]. All rights reserved. This information is not intended as a substitute for professional medical care. Always follow your healthcare  professional's instructions.

## 2020-06-08 NOTE — PROGRESS NOTES
Subjective:       Patient ID: Leodan Dan is a 54 y.o. male.    Chief Complaint: Follow-up (hospital)    He comes into the office for hospital follow-up.  He was admitted and treated at [facility] for sepsis related to infected dialysis shunt.  He has had a temporary catheter placed to the left chest wall.  He denies fever.  No shortness of breath or chest pain.  He is scheduled to follow-up with Dr. Wilson.    Transitional Care Note    Family and/or Caretaker present at visit?  No.  Diagnostic tests reviewed/disposition: I have reviewed all completed as well as pending diagnostic tests at the time of discharge.  Disease/illness education: Sepsis  Home health/community services discussion/referrals: Patient does not have home health established from hospital visit.  They do not need home health.  If needed, we will set up home health for the patient.   Establishment or re-establishment of referral orders for community resources: No other necessary community resources.   Discussion with other health care providers: No discussion with other health care providers necessary.       Abdominal Pain  This is a new problem. The current episode started more than 1 month ago. The problem occurs intermittently. The problem has been waxing and waning. The pain is located in the generalized abdominal region. The quality of the pain is cramping. Associated symptoms include diarrhea. Pertinent negatives include no arthralgias, dysuria, fever, frequency, headaches, myalgias, nausea or vomiting. The pain is aggravated by eating. He has tried antacids, acetaminophen, H2 blockers and proton pump inhibitors for the symptoms. The treatment provided no relief.        Review of Systems   Constitutional: Negative for fatigue, fever and unexpected weight change.   HENT: Negative for ear pain and sore throat.    Eyes: Negative.  Negative for pain and visual disturbance.   Respiratory: Negative for cough and shortness of breath.     Cardiovascular: Negative for chest pain and palpitations.   Gastrointestinal: Positive for abdominal pain and diarrhea. Negative for nausea and vomiting.   Genitourinary: Negative for dysuria and frequency.   Musculoskeletal: Negative for arthralgias and myalgias.   Skin: Negative for color change and rash.   Neurological: Negative for dizziness and headaches.   Psychiatric/Behavioral: Negative for sleep disturbance. The patient is not nervous/anxious.        Vitals:    06/08/20 0932   BP: (!) 160/88   Pulse: 71   Temp: 98.7 °F (37.1 °C)       Objective:     Current Outpatient Medications   Medication Sig Dispense Refill    ascorbic acid (VITAMIN C) 1000 MG tablet Take 1,000 mg by mouth once daily.      aspirin (ECOTRIN) 81 MG EC tablet Take 81 mg by mouth once daily.      cinacalcet (SENSIPAR) 30 MG Tab TAKE 1 TABLET BY MOUTH THREE TIMES A WEEK  0    ferrous sulfate 134 mg (27 mg iron) Tab Take 65 mg by mouth once daily.       gabapentin (NEURONTIN) 300 MG capsule Take 1 capsule (300 mg total) by mouth every morning. 30 capsule 2    MEN'S MULTI-VITAMIN ORAL Take by mouth.      ondansetron (ZOFRAN) 8 MG tablet Take 8 mg by mouth every 8 (eight) hours as needed.  0    pantoprazole (PROTONIX) 40 MG tablet Take 40 mg by mouth once daily.  1    pyridostigmine (MESTINON) 60 mg Tab TAKE 2 TABLETS BY MOUTH THREE TIMES DAILY 180 tablet 0    sevelamer carbonate (RENVELA) 800 mg Tab Take 2,400 mg by mouth 3 (three) times daily with meals.      sodium bicarbonate 650 MG tablet Take 1,300 mg by mouth 2 (two) times daily.  0    sucralfate (CARAFATE) 100 mg/mL suspension Take 1 g by mouth every 6 (six) hours.      vitamin D (VITAMIN D3) 1000 units Tab Take 1,000 Units by mouth once daily.      carvedilol (COREG) 25 MG tablet Take 1 tablet (25 mg total) by mouth 2 (two) times daily. (Patient not taking: Reported on 6/8/2020) 180 tablet 3    diclofenac sodium (VOLTAREN) 1 % Gel Apply 2 g topically 3 (three) times  daily. (Patient not taking: Reported on 2/26/2020) 100 g 3    hydrALAZINE (APRESOLINE) 50 MG tablet Take 50 mg by mouth 3 (three) times daily.  4    losartan (COZAAR) 50 MG tablet Take 1 tablet (50 mg total) by mouth once daily. (Patient not taking: Reported on 6/8/2020) 90 tablet 3    methocarbamol (ROBAXIN) 500 MG Tab Take 1 tablet (500 mg total) by mouth 3 (three) times daily as needed. (Patient not taking: Reported on 2/26/2020) 44 tablet 0    midodrine (PROAMATINE) 10 MG tablet Take 0.5 tablets (5 mg total) by mouth 2 (two) times daily. (Patient not taking: Reported on 6/8/2020) 60 tablet 0    NIFEdipine (PROCARDIA-XL) 90 MG (OSM) 24 hr tablet Take 90 mg by mouth once daily.  3    sertraline (ZOLOFT) 25 MG tablet Take 1 tablet (25 mg total) by mouth once daily. 30 tablet 11     No current facility-administered medications for this visit.        Physical Exam   Constitutional: He is oriented to person, place, and time. He appears well-developed. No distress.   HENT:   Head: Normocephalic and atraumatic.   Eyes: Pupils are equal, round, and reactive to light. EOM are normal.   Neck: Normal range of motion. Neck supple.   Cardiovascular: Normal rate and regular rhythm.   Pulmonary/Chest: Effort normal and breath sounds normal.   Musculoskeletal: Normal range of motion.   Neurological: He is alert and oriented to person, place, and time.   Skin: Skin is warm and dry. No rash noted.   Psychiatric: He has a normal mood and affect. Thought content normal.   Nursing note and vitals reviewed.      Assessment:       1. Irritable bowel syndrome with diarrhea        Plan:   Irritable bowel syndrome with diarrhea    Other orders  -     sertraline (ZOLOFT) 25 MG tablet; Take 1 tablet (25 mg total) by mouth once daily.  Dispense: 30 tablet; Refill: 11        No follow-ups on file.    Patient Instructions   Over the counter IB guard and probiotics for chronic diarrhea related to IBS          Irritable Bowel  Syndrome    Irritable bowel syndrome (IBS) is a disorder of the intestines. It is not a disease, but a group of symptoms caused by changes in the way the intestines work. It is fairly common, but the cause is not well understood.  Symptoms of IBS include:  · Abdominal pain, discomfort, and cramping  · Diarrhea  · Constipation or dry, hard stools  · Mucous stool  · Bloating  · Feeling of incomplete bowel movements  It usually results in one of 3 patterns of symptoms:  · Chronic abdominal pain and constipation  · Recurring episodes of diarrhea, with or without pain  · Alternating diarrhea and constipation  Home care  The goal of treatment is to control and relieve your symptoms, so you can lead a full and active life. There is no cure for IBS. But it can be managed.  Diet  Your diet did not cause your IBS, but it can affect it. No one diet works for everyone. Finding the best foods for you may take trial and error. Keep a food log to help find what foods made your symptoms worse. Below are some tips that may help you.  · Eat more slowly. Eat smaller amounts at a time, but more often. Remember, you can always eat more, but cannot eat less once youve eaten too much.  · High-fiber foods are complicated. While they may help relieve constipation, they can make your bloating, cramping, gas, and diarrhea worse.  · Eat less sugar.  · Try cutting out dairy products. Sometimes this helps.  · Try cutting out foods that are high in fat and fatty meats.  · You can control bloating or passing excess gas. Be careful with gassy vegetables and fruits like beans, cabbage, broccoli, and cauliflower.  · Be careful with carbonated beverages and fruit juices. They can make your bloating and diarrhea worse.  · Caffeine, alcohol, and stimulants can make symptoms worse. These include coffee, tea, sodas, energy drinks, and chocolate.  Lifestyle  · Look for factors that seem to worsen your symptoms. These include stress and  emotions.   · Although stress does not cause IBS, it may trigger flare-ups. Counseling can help you learn to handle stress. So can self-help measures like exercise, yoga, and meditation.  · Depression can occur along with IBS. Your healthcare provider may prescribe antidepressant medicine. This may help with diarrhea, constipation, and cramping, as well as with symptoms of depression.  · Smoking doesn't cause IBS, but can make the symptoms worse.  Medicines  Your healthcare provider may prescribe medicines. Take them as directed. For acute flare-ups of your illness, your provider may give you prescription medicines.  · Check with your healthcare provider before taking any medicines for diarrhea.  · Avoid anti-inflammatory medicines like ibuprofen or naproxen.  · Consider nutritional supplements. This is especially true if your diarrhea is prolonged, or you aren't eating or are losing weight  Follow-up care  Follow up with your healthcare provider, or as advised. If a stool sample was taken or cultures were done, you will be told if your treatment needs to change. You can call as directed for the results.  When to seek medical advice  Call your healthcare provider right away if any of these occur:  · Abdominal pain gets worse  · Constant abdominal pain moves to the right-lower abdomen  · You can't keep liquids down because of vomiting  · You have severe diarrhea  · You have blood (red or black color) or mucus in your stool  · You feel very weak or dizzy, faint, or have extreme thirst  · You have a fever of 100.4ºF (38.0ºC) or higher, or as directed by your healthcare provider  Date Last Reviewed: 8/31/2015  © 4758-4535 [employer]. [address] [place] [address], [place] [address]. All rights reserved. This information is not intended as a substitute for professional medical care. Always follow your healthcare professional's instructions.

## 2020-08-12 ENCOUNTER — PATIENT OUTREACH (OUTPATIENT)
Dept: ADMINISTRATIVE | Facility: HOSPITAL | Age: 55
End: 2020-08-12

## 2020-08-12 NOTE — PROGRESS NOTES
Working [employer] [facility] report.  Attempted to call patient to schedule overdue annual with PCP.   No answer. Left message.

## 2022-07-08 ENCOUNTER — PATIENT MESSAGE (OUTPATIENT)
Dept: FAMILY MEDICINE | Facility: CLINIC | Age: 57
End: 2022-07-08
Payer: COMMERCIAL

## 2022-12-23 ENCOUNTER — PATIENT MESSAGE (OUTPATIENT)
Dept: FAMILY MEDICINE | Facility: CLINIC | Age: 57
End: 2022-12-23
Payer: COMMERCIAL

## 2024-09-11 ENCOUNTER — PATIENT MESSAGE (OUTPATIENT)
Dept: FAMILY MEDICINE | Facility: CLINIC | Age: 59
End: 2024-09-11
Payer: COMMERCIAL

## 2024-12-16 NOTE — TELEPHONE ENCOUNTER
Called patient to reschedule appointment.   
----- Message from Mary Jane Ovalle sent at 7/25/2019  3:15 PM CDT -----  Contact: Leodan velazquez  Type: Needs Medical Advice    Who Called:  Leodan Ward Call Back Number: [phone]  Additional Information: Pt is requesting to have orders for blood work put in for tomorrow before his appt., He had to reschedule his appt for tomorrow due to just getting out of the hospital. Pls call to adv    
In responding to this request, please exercise your independent professional judgment. The fact that a question is asked does not imply that any particular answer is desired or expected. Documentation clarification is required for compliance.   This form is NOT a part of the permanent Medical Record.

## 2025-01-31 NOTE — TELEPHONE ENCOUNTER
----- Message from Ortiz Ryan sent at 4/10/2019 10:14 AM CDT -----  Contact: [phone]  Returning call, please call back at [phone].  Thx-Ah    ----- Message from Tatiana Montes De Oca sent at 1/31/2025  8:41 AM CST -----  Can we set up a tele visit to discuss result? I was unable to reach him on phone. Thank you.